# Patient Record
Sex: MALE | Race: WHITE | NOT HISPANIC OR LATINO | Employment: FULL TIME | URBAN - METROPOLITAN AREA
[De-identification: names, ages, dates, MRNs, and addresses within clinical notes are randomized per-mention and may not be internally consistent; named-entity substitution may affect disease eponyms.]

---

## 2023-07-31 ENCOUNTER — LAB (OUTPATIENT)
Dept: LAB | Facility: LAB | Age: 63
End: 2023-07-31
Payer: COMMERCIAL

## 2023-07-31 LAB
ALANINE AMINOTRANSFERASE (SGPT) (U/L) IN SER/PLAS: 11 U/L (ref 10–52)
ALBUMIN (G/DL) IN SER/PLAS: 4.1 G/DL (ref 3.4–5)
ALKALINE PHOSPHATASE (U/L) IN SER/PLAS: 48 U/L (ref 33–136)
ANION GAP IN SER/PLAS: 13 MMOL/L (ref 10–20)
ASPARTATE AMINOTRANSFERASE (SGOT) (U/L) IN SER/PLAS: 18 U/L (ref 9–39)
BILIRUBIN TOTAL (MG/DL) IN SER/PLAS: 0.7 MG/DL (ref 0–1.2)
CALCIUM (MG/DL) IN SER/PLAS: 8.9 MG/DL (ref 8.6–10.6)
CARBON DIOXIDE, TOTAL (MMOL/L) IN SER/PLAS: 26 MMOL/L (ref 21–32)
CHLORIDE (MMOL/L) IN SER/PLAS: 106 MMOL/L (ref 98–107)
CREATININE (MG/DL) IN SER/PLAS: 1.25 MG/DL (ref 0.5–1.3)
ERYTHROCYTE DISTRIBUTION WIDTH (RATIO) BY AUTOMATED COUNT: 12.8 % (ref 11.5–14.5)
ERYTHROCYTE MEAN CORPUSCULAR HEMOGLOBIN CONCENTRATION (G/DL) BY AUTOMATED: 31.9 G/DL (ref 32–36)
ERYTHROCYTE MEAN CORPUSCULAR VOLUME (FL) BY AUTOMATED COUNT: 95 FL (ref 80–100)
ERYTHROCYTES (10*6/UL) IN BLOOD BY AUTOMATED COUNT: 4.1 X10E12/L (ref 4.5–5.9)
GFR MALE: 64 ML/MIN/1.73M2
GLUCOSE (MG/DL) IN SER/PLAS: 97 MG/DL (ref 74–99)
HEMATOCRIT (%) IN BLOOD BY AUTOMATED COUNT: 38.9 % (ref 41–52)
HEMOGLOBIN (G/DL) IN BLOOD: 12.4 G/DL (ref 13.5–17.5)
LEUKOCYTES (10*3/UL) IN BLOOD BY AUTOMATED COUNT: 6.8 X10E9/L (ref 4.4–11.3)
MAGNESIUM (MG/DL) IN SER/PLAS: 2.12 MG/DL (ref 1.6–2.4)
NRBC (PER 100 WBCS) BY AUTOMATED COUNT: 0 /100 WBC (ref 0–0)
PLATELETS (10*3/UL) IN BLOOD AUTOMATED COUNT: 183 X10E9/L (ref 150–450)
POTASSIUM (MMOL/L) IN SER/PLAS: 4.4 MMOL/L (ref 3.5–5.3)
PROTEIN TOTAL: 6.6 G/DL (ref 6.4–8.2)
SODIUM (MMOL/L) IN SER/PLAS: 141 MMOL/L (ref 136–145)
THYROTROPIN (MIU/L) IN SER/PLAS BY DETECTION LIMIT <= 0.05 MIU/L: 2.09 MIU/L (ref 0.44–3.98)
TOBACCO SCREEN, URINE: NEGATIVE
UREA NITROGEN (MG/DL) IN SER/PLAS: 21 MG/DL (ref 6–23)

## 2023-09-22 ENCOUNTER — OFFICE VISIT (OUTPATIENT)
Dept: PRIMARY CARE | Facility: CLINIC | Age: 63
End: 2023-09-22
Payer: COMMERCIAL

## 2023-09-22 VITALS
SYSTOLIC BLOOD PRESSURE: 133 MMHG | HEART RATE: 62 BPM | DIASTOLIC BLOOD PRESSURE: 83 MMHG | OXYGEN SATURATION: 97 % | BODY MASS INDEX: 29.98 KG/M2 | WEIGHT: 191 LBS | HEIGHT: 67 IN

## 2023-09-22 DIAGNOSIS — Z00.00 PHYSICAL EXAM: ICD-10-CM

## 2023-09-22 DIAGNOSIS — Z12.11 ENCOUNTER FOR SCREENING FOR MALIGNANT NEOPLASM OF COLON: Primary | ICD-10-CM

## 2023-09-22 PROCEDURE — 90715 TDAP VACCINE 7 YRS/> IM: CPT | Performed by: FAMILY MEDICINE

## 2023-09-22 PROCEDURE — 99386 PREV VISIT NEW AGE 40-64: CPT | Performed by: FAMILY MEDICINE

## 2023-09-22 PROCEDURE — 90471 IMMUNIZATION ADMIN: CPT | Performed by: FAMILY MEDICINE

## 2023-09-22 PROCEDURE — 1126F AMNT PAIN NOTED NONE PRSNT: CPT | Performed by: FAMILY MEDICINE

## 2023-09-22 RX ORDER — SACUBITRIL AND VALSARTAN 97; 103 MG/1; MG/1
0.5 TABLET, FILM COATED ORAL 2 TIMES DAILY
COMMUNITY
Start: 2020-12-14 | End: 2024-04-02 | Stop reason: SDUPTHER

## 2023-09-22 RX ORDER — ATORVASTATIN CALCIUM 10 MG/1
10 TABLET, FILM COATED ORAL DAILY
COMMUNITY
End: 2023-12-13 | Stop reason: SDUPTHER

## 2023-09-22 RX ORDER — METOPROLOL SUCCINATE 100 MG/1
TABLET, EXTENDED RELEASE ORAL
COMMUNITY
Start: 2020-12-14 | End: 2023-10-27 | Stop reason: ALTCHOICE

## 2023-09-22 RX ORDER — FUROSEMIDE 20 MG/1
1 TABLET ORAL DAILY
COMMUNITY
Start: 2021-10-05 | End: 2023-10-27 | Stop reason: ALTCHOICE

## 2023-09-22 RX ORDER — APIXABAN 5 MG/1
1 TABLET, FILM COATED ORAL 2 TIMES DAILY
COMMUNITY
Start: 2020-12-14 | End: 2023-10-27 | Stop reason: ALTCHOICE

## 2023-09-22 RX ORDER — AMIODARONE HYDROCHLORIDE 200 MG/1
200 TABLET ORAL DAILY
COMMUNITY
Start: 2023-09-01 | End: 2023-10-27 | Stop reason: ALTCHOICE

## 2023-09-22 ASSESSMENT — ENCOUNTER SYMPTOMS
NEUROLOGICAL NEGATIVE: 1
CARDIOVASCULAR NEGATIVE: 1
GASTROINTESTINAL NEGATIVE: 1
MUSCULOSKELETAL NEGATIVE: 1
RESPIRATORY NEGATIVE: 1

## 2023-09-22 NOTE — PROGRESS NOTES
Subjective   Patient ID: Endy Nuñez is a 63 y.o. male who presents for Annual Exam.  HPI  Patient with a history of chronic A-fib has been cardioverted a few times and has had ablation procedures done.  Patient is presently on anticoagulants couple cardiac medications needs follow-up blood work.  Review of Systems   HENT: Negative.     Respiratory: Negative.     Cardiovascular: Negative.    Gastrointestinal: Negative.    Genitourinary: Negative.    Musculoskeletal: Negative.    Neurological: Negative.        Objective   Physical Exam  Constitutional:       Appearance: Normal appearance.   HENT:      Nose: Nose normal.      Mouth/Throat:      Mouth: Mucous membranes are moist.   Eyes:      Extraocular Movements: Extraocular movements intact.      Pupils: Pupils are equal, round, and reactive to light.   Cardiovascular:      Rate and Rhythm: Normal rate and regular rhythm.   Pulmonary:      Effort: Pulmonary effort is normal.      Breath sounds: Normal breath sounds.   Genitourinary:     Rectum: Normal.   Musculoskeletal:         General: Normal range of motion.      Cervical back: Normal range of motion.   Neurological:      Mental Status: He is alert.       Assessment/Plan   Diagnoses and all orders for this visit:  Encounter for screening for malignant neoplasm of colon  -     Colonoscopy Diagnostic; Future  Physical exam  -     Lipid panel; Future  -     PSA; Future  Other orders  -     Tdap vaccine, age 7 years and older  (BOOSTRIX)

## 2023-09-25 ENCOUNTER — LAB (OUTPATIENT)
Dept: LAB | Facility: LAB | Age: 63
End: 2023-09-25
Payer: COMMERCIAL

## 2023-09-25 DIAGNOSIS — Z00.00 PHYSICAL EXAM: ICD-10-CM

## 2023-09-25 LAB
CHOLESTEROL (MG/DL) IN SER/PLAS: 196 MG/DL (ref 0–199)
CHOLESTEROL IN HDL (MG/DL) IN SER/PLAS: 70.3 MG/DL
CHOLESTEROL/HDL RATIO: 2.8
LDL: 93 MG/DL (ref 0–99)
PROSTATE SPECIFIC AG (NG/ML) IN SER/PLAS: 1.67 NG/ML (ref 0–4)
TRIGLYCERIDE (MG/DL) IN SER/PLAS: 162 MG/DL (ref 0–149)
VLDL: 32 MG/DL (ref 0–40)

## 2023-09-25 PROCEDURE — 84153 ASSAY OF PSA TOTAL: CPT

## 2023-09-25 PROCEDURE — 36415 COLL VENOUS BLD VENIPUNCTURE: CPT

## 2023-09-25 PROCEDURE — 80061 LIPID PANEL: CPT

## 2023-10-11 ENCOUNTER — PHARMACY VISIT (OUTPATIENT)
Dept: PHARMACY | Facility: CLINIC | Age: 63
End: 2023-10-11
Payer: COMMERCIAL

## 2023-10-11 PROCEDURE — RXMED WILLOW AMBULATORY MEDICATION CHARGE

## 2023-10-12 RX ORDER — TIMOLOL MALEATE 5 MG/ML
SOLUTION/ DROPS OPHTHALMIC
Qty: 55 ML | Refills: 0 | OUTPATIENT
Start: 2023-10-12 | End: 2024-10-11

## 2023-10-13 ENCOUNTER — PHARMACY VISIT (OUTPATIENT)
Dept: PHARMACY | Facility: CLINIC | Age: 63
End: 2023-10-13
Payer: COMMERCIAL

## 2023-10-13 PROCEDURE — RXMED WILLOW AMBULATORY MEDICATION CHARGE

## 2023-10-18 ENCOUNTER — PHARMACY VISIT (OUTPATIENT)
Dept: PHARMACY | Facility: CLINIC | Age: 63
End: 2023-10-18
Payer: COMMERCIAL

## 2023-10-18 DIAGNOSIS — Z12.11 SCREENING FOR COLON CANCER: ICD-10-CM

## 2023-10-18 RX ORDER — POLYETHYLENE GLYCOL-3350 AND ELECTROLYTES 236; 6.74; 5.86; 2.97; 22.74 G/274.31G; G/274.31G; G/274.31G; G/274.31G; G/274.31G
POWDER, FOR SOLUTION ORAL
Qty: 4000 ML | Refills: 0 | Status: SHIPPED | OUTPATIENT
Start: 2023-10-18 | End: 2024-05-24 | Stop reason: WASHOUT

## 2023-10-23 ENCOUNTER — PHARMACY VISIT (OUTPATIENT)
Dept: PHARMACY | Facility: CLINIC | Age: 63
End: 2023-10-23
Payer: COMMERCIAL

## 2023-10-23 PROCEDURE — RXMED WILLOW AMBULATORY MEDICATION CHARGE

## 2023-10-26 PROBLEM — R06.09 DOE (DYSPNEA ON EXERTION): Status: ACTIVE | Noted: 2023-10-26

## 2023-10-26 PROBLEM — I71.21 ANEURYSM OF ASCENDING AORTA (CMS-HCC): Status: ACTIVE | Noted: 2023-10-26

## 2023-10-26 PROBLEM — I42.9 CARDIOMYOPATHY (MULTI): Status: ACTIVE | Noted: 2023-10-26

## 2023-10-26 PROBLEM — R06.83 SNORES: Status: ACTIVE | Noted: 2023-10-26

## 2023-10-26 PROBLEM — R00.2 PALPITATIONS: Status: ACTIVE | Noted: 2023-10-26

## 2023-10-26 PROBLEM — R91.8 PULMONARY NODULES: Status: ACTIVE | Noted: 2023-10-26

## 2023-10-26 PROBLEM — I48.91 ATRIAL FIBRILLATION (MULTI): Status: ACTIVE | Noted: 2023-10-26

## 2023-10-26 PROBLEM — I50.9 CHF (CONGESTIVE HEART FAILURE) (MULTI): Status: ACTIVE | Noted: 2023-10-26

## 2023-10-26 PROBLEM — R93.1 ELEVATED CORONARY ARTERY CALCIUM SCORE: Status: ACTIVE | Noted: 2023-10-26

## 2023-10-26 PROBLEM — M65.319 TRIGGER THUMB: Status: ACTIVE | Noted: 2023-10-26

## 2023-10-26 PROBLEM — R07.89 ATYPICAL CHEST PAIN: Status: ACTIVE | Noted: 2023-10-26

## 2023-10-26 PROBLEM — G47.33 OBSTRUCTIVE SLEEP APNEA, ADULT: Status: ACTIVE | Noted: 2023-10-26

## 2023-10-27 ENCOUNTER — LAB (OUTPATIENT)
Dept: LAB | Facility: LAB | Age: 63
End: 2023-10-27
Payer: COMMERCIAL

## 2023-10-27 ENCOUNTER — OFFICE VISIT (OUTPATIENT)
Dept: CARDIOLOGY | Facility: CLINIC | Age: 63
End: 2023-10-27
Payer: COMMERCIAL

## 2023-10-27 VITALS
HEART RATE: 60 BPM | SYSTOLIC BLOOD PRESSURE: 104 MMHG | DIASTOLIC BLOOD PRESSURE: 69 MMHG | BODY MASS INDEX: 29.18 KG/M2 | WEIGHT: 192.5 LBS | HEIGHT: 68 IN | OXYGEN SATURATION: 95 %

## 2023-10-27 DIAGNOSIS — I48.91 ATRIAL FIBRILLATION, UNSPECIFIED TYPE (MULTI): Primary | ICD-10-CM

## 2023-10-27 DIAGNOSIS — I48.91 ATRIAL FIBRILLATION, UNSPECIFIED TYPE (MULTI): ICD-10-CM

## 2023-10-27 LAB
ALBUMIN SERPL BCP-MCNC: 4.2 G/DL (ref 3.4–5)
ALP SERPL-CCNC: 44 U/L (ref 33–136)
ALT SERPL W P-5'-P-CCNC: 15 U/L (ref 10–52)
ANION GAP SERPL CALC-SCNC: 12 MMOL/L (ref 10–20)
AST SERPL W P-5'-P-CCNC: 20 U/L (ref 9–39)
BILIRUB SERPL-MCNC: 0.6 MG/DL (ref 0–1.2)
BUN SERPL-MCNC: 20 MG/DL (ref 6–23)
CALCIUM SERPL-MCNC: 8.9 MG/DL (ref 8.6–10.6)
CHLORIDE SERPL-SCNC: 106 MMOL/L (ref 98–107)
CO2 SERPL-SCNC: 26 MMOL/L (ref 21–32)
CREAT SERPL-MCNC: 1.2 MG/DL (ref 0.5–1.3)
ERYTHROCYTE [DISTWIDTH] IN BLOOD BY AUTOMATED COUNT: 13 % (ref 11.5–14.5)
GFR SERPL CREATININE-BSD FRML MDRD: 67 ML/MIN/1.73M*2
GLUCOSE SERPL-MCNC: 59 MG/DL (ref 74–99)
HCT VFR BLD AUTO: 41.4 % (ref 41–52)
HGB BLD-MCNC: 13.5 G/DL (ref 13.5–17.5)
MCH RBC QN AUTO: 30.3 PG (ref 26–34)
MCHC RBC AUTO-ENTMCNC: 32.6 G/DL (ref 32–36)
MCV RBC AUTO: 93 FL (ref 80–100)
NRBC BLD-RTO: 0 /100 WBCS (ref 0–0)
PLATELET # BLD AUTO: 176 X10*3/UL (ref 150–450)
PMV BLD AUTO: 11.3 FL (ref 7.5–11.5)
POTASSIUM SERPL-SCNC: 4.5 MMOL/L (ref 3.5–5.3)
PROT SERPL-MCNC: 6.5 G/DL (ref 6.4–8.2)
RBC # BLD AUTO: 4.46 X10*6/UL (ref 4.5–5.9)
SODIUM SERPL-SCNC: 139 MMOL/L (ref 136–145)
WBC # BLD AUTO: 6.4 X10*3/UL (ref 4.4–11.3)

## 2023-10-27 PROCEDURE — 99214 OFFICE O/P EST MOD 30 MIN: CPT | Performed by: NURSE PRACTITIONER

## 2023-10-27 PROCEDURE — 1036F TOBACCO NON-USER: CPT | Performed by: NURSE PRACTITIONER

## 2023-10-27 PROCEDURE — 93010 ELECTROCARDIOGRAM REPORT: CPT | Performed by: INTERNAL MEDICINE

## 2023-10-27 PROCEDURE — 93005 ELECTROCARDIOGRAM TRACING: CPT | Performed by: NURSE PRACTITIONER

## 2023-10-27 PROCEDURE — 36415 COLL VENOUS BLD VENIPUNCTURE: CPT

## 2023-10-27 PROCEDURE — 85027 COMPLETE CBC AUTOMATED: CPT

## 2023-10-27 PROCEDURE — 1159F MED LIST DOCD IN RCRD: CPT | Performed by: NURSE PRACTITIONER

## 2023-10-27 PROCEDURE — 80053 COMPREHEN METABOLIC PANEL: CPT

## 2023-10-27 PROCEDURE — 1126F AMNT PAIN NOTED NONE PRSNT: CPT | Performed by: NURSE PRACTITIONER

## 2023-10-27 ASSESSMENT — ENCOUNTER SYMPTOMS
ABDOMINAL PAIN: 0
PND: 0
DIAPHORESIS: 0
COUGH: 0
IRREGULAR HEARTBEAT: 0
BLOATING: 1
DIZZINESS: 0
BLURRED VISION: 0
SNORING: 0
SPUTUM PRODUCTION: 0
PALPITATIONS: 0
DYSPNEA ON EXERTION: 0
NEAR-SYNCOPE: 0
HEMOPTYSIS: 0
ORTHOPNEA: 0
DIARRHEA: 0
SYNCOPE: 0
HEADACHES: 0
LIGHT-HEADEDNESS: 0
SHORTNESS OF BREATH: 0
FEVER: 0
WEAKNESS: 0
MYALGIAS: 0
VOMITING: 0
DOUBLE VISION: 0
SORE THROAT: 0
NAUSEA: 0
FALLS: 0

## 2023-10-27 ASSESSMENT — PAIN SCALES - GENERAL: PAINLEVEL: 0-NO PAIN

## 2023-10-27 NOTE — PROGRESS NOTES
Subjective   Endy Nuñez is a 63 y.o. male.    Chief Complaint:  Atrial Fibrillation    Endy Nuñez is a 63 year old with:     1. AF and AFL - index diagnosis in November of 2020. He underwent HASEEB guided cardioversion 11/24/2020. He reverted into atrial flutter right afterwards and was started on Dofetilide. The dose had to be reduced to 125 mcg because of the prolong QT but he still continue to have the episodes of AF and AFL. At the same time he was diagnosed with EF of 35 % and underwent SG guided CHF treatment    On 12/08/2020 he underwent PVI and RFA of atrial flutter. He is feeling better and better as the days go by. ECHO 12/17/2020 showed EF of 45-50%.   He continued to have episodes of fast heart rate on his Fitbit. . A monitor that he wore in February of 2021 shows 94% sinus rhythm.   FEB 2021: He has tachycardia induced cardiomyopathy. His predominant rhythm was atrial flutter/tach and AF. He failed Tikosyn.  ECHO: TTE (October of 2021) LVEF 60-65%. Ascending aorta 4.1 cm. Mild LVH. No significant valve disease. LVIDd 4.25 cm.     Flecainide was started at Dr. Cochran's last visit 11/15/2022 what he was found to be back in Afib.  He then underwent successful HASEEB guided DCCV on 12/8/2022  At some point the Flecainide was switched to Amiodarone     Now s/p redo PVI, posterior and anterior wall scar modification RFA with Dr. Francisco 3/6/2023  ECG 4/18/2023 SB HR 52 bpm  Amiodarone was stopped post ablation  ECG 6/30/2023 SB Hr 56 bpm normal intervals. Amiodarone was resumed due to recurrent arrhythmia symptoms and abnormal ECGs on his smart watch      Holter monitor 6/30-7/13/23 SB-ST, max  bpm, minimum HR 37bpm, avg 60 bpm, <1% VE burden, 1% SVE burden, 211 SVT episodes, longest 481 beats, fastest 161 on 6/30. SVT episodes appear to be Atach  ECG 7/28/2023 NSR HR 54 bpm, QTc 479 ms  Patient had low BP and was dizzy at the visit in July.  Lab work revealed some mild anemia and his Entresto dose was  cut in half.  Patient just got a refill of his medication and requested that he cut his current pills in half instead of getting a new prescription.    ECG 10/27/2023 SB with PAC HR 58 bpm, Qtc 4865 ms    TODAY patient presents for 3-month follow-up of atrial arrhythmias.  He has been wearing his smart watch and has denied any further episodes of elevated heart rate or arrhythmia symptoms.  He followed up with his primary care provider after our last visit, who has ordered a colonoscopy.  Patient is wondering if he can recheck his lab work today.  He is still cutting his Entresto in half.  He denies any further dizziness.  However he has noticed an increased need for his furosemide as needed.  He is taking it now up to 4 times a week.  He notices fluid retention in his abdomen and lower extremities.  He denies any dyspnea on exertion, orthopnea, or chest pain.  He denies any blood in his urine or stool.        Review of Systems   Constitutional: Negative for diaphoresis, fever and malaise/fatigue.   HENT:  Negative for congestion and sore throat.    Eyes:  Negative for blurred vision and double vision.   Cardiovascular:  Positive for leg swelling. Negative for chest pain, dyspnea on exertion, irregular heartbeat, near-syncope, orthopnea, palpitations, paroxysmal nocturnal dyspnea and syncope.   Respiratory:  Negative for cough, hemoptysis, shortness of breath, snoring and sputum production.    Hematologic/Lymphatic: Negative for bleeding problem.   Skin:  Negative for rash.   Musculoskeletal:  Negative for falls, joint pain and myalgias.   Gastrointestinal:  Positive for bloating. Negative for abdominal pain, diarrhea, nausea and vomiting.   Neurological:  Negative for dizziness, headaches, light-headedness and weakness.   All other systems reviewed and are negative.      Objective   Constitutional:       Appearance: Healthy appearance. Not in distress.   Eyes:      Conjunctiva/sclera: Conjunctivae normal.   HENT:       Nose: Nose normal.    Mouth/Throat:      Pharynx: Oropharynx is clear.   Pulmonary:      Effort: Pulmonary effort is normal.      Breath sounds: Normal breath sounds. No wheezing. No rhonchi.   Chest:      Chest wall: Not tender to palpatation.   Cardiovascular:      Normal rate. Regular rhythm.      Murmurs: There is no murmur.      No rub.   Pulses:     Intact distal pulses.   Edema:     Ankle: bilateral trace edema of the ankle.     Feet: bilateral trace edema of the feet.  Abdominal:      General: Bowel sounds are normal.      Palpations: Abdomen is soft.   Musculoskeletal: Normal range of motion.      Cervical back: Neck supple. Skin:     General: Skin is warm and dry.   Neurological:      Mental Status: Alert and oriented to person, place and time.      Motor: Motor function is intact.         Lab Review:   Lab Results   Component Value Date     07/31/2023    K 4.4 07/31/2023     07/31/2023    CO2 26 07/31/2023    BUN 21 07/31/2023    CREATININE 1.25 07/31/2023    GLUCOSE 97 07/31/2023    CALCIUM 8.9 07/31/2023     Lab Results   Component Value Date    WBC 6.8 07/31/2023    HGB 12.4 (L) 07/31/2023    HCT 38.9 (L) 07/31/2023    MCV 95 07/31/2023     07/31/2023       Assessment/Plan   The encounter diagnosis was Atrial fibrillation, unspecified type (CMS/HCC).  Patient has been doing well from an arrhythmia standpoint.  His blood pressure is still on the low/normal side.  However he has noticed increased need for diuretic after reducing his Entresto dosage.  I am pleased to see that he followed up with his primary care provider regarding the anemia and has been ordered a colonoscopy.  He denies any noticeable bleeding from anywhere.    We will stop his amiodarone today, I educated him on signs/symptoms to look out for should his arrhythmia return.  Patient knows to call the office if this happens.  Continue all other medications as ordered, I recommend he follow-up with Dr. Cochran  regarding his diuretic need.  We can recheck a CBC/CMP today  Follow-up with me in 6 months or sooner as needed    Plan discussed with Dr. Francisco

## 2023-10-27 NOTE — LETTER
October 27, 2023     Eliana Be MD  [address]  [facility] [address]    Patient: Endy Nuñez   YOB: 1960   Date of Visit: 10/27/2023       Dear Dr. Eliana Be MD:    Thank you for referring Endy Nuñez to me for evaluation. Below are my notes for this consultation.  If you have questions, please do not hesitate to call me. I look forward to following your patient along with you.       Sincerely,     Kaylyn Potter, APRN-CNP      CC: No Recipients  ______________________________________________________________________________________    Subjective  Endy Nuñez is a 63 y.o. male.    Chief Complaint:  Atrial Fibrillation    Endy Nuñez is a 63 year old with:     1. AF and AFL - index diagnosis in November of 2020. He underwent HASEEB guided cardioversion 11/24/2020. He reverted into atrial flutter right afterwards and was started on Dofetilide. The dose had to be reduced to 125 mcg because of the prolong QT but he still continue to have the episodes of AF and AFL. At the same time he was diagnosed with EF of 35 % and underwent SG guided CHF treatment    On 12/08/2020 he underwent PVI and RFA of atrial flutter. He is feeling better and better as the days go by. ECHO 12/17/2020 showed EF of 45-50%.   He continued to have episodes of fast heart rate on his Fitbit. . A monitor that he wore in February of 2021 shows 94% sinus rhythm.   FEB 2021: He has tachycardia induced cardiomyopathy. His predominant rhythm was atrial flutter/tach and AF. He failed Tikosyn.  ECHO: TTE (October of 2021) LVEF 60-65%. Ascending aorta 4.1 cm. Mild LVH. No significant valve disease. LVIDd 4.25 cm.     Flecainide was started at Dr. Be's last visit 11/15/2022 what he was found to be back in Afib.  He then underwent successful HASEEB guided DCCV on 12/8/2022  At some point the Flecainide was switched to Amiodarone     Now s/p redo PVI, posterior and anterior wall scar modification RFA with Dr. Francisco  3/6/2023  ECG 4/18/2023 SB HR 52 bpm  Amiodarone was stopped post ablation  ECG 6/30/2023 SB Hr 56 bpm normal intervals. Amiodarone was resumed due to recurrent arrhythmia symptoms and abnormal ECGs on his smart watch      Holter monitor 6/30-7/13/23 SB-ST, max  bpm, minimum HR 37bpm, avg 60 bpm, <1% VE burden, 1% SVE burden, 211 SVT episodes, longest 481 beats, fastest 161 on 6/30. SVT episodes appear to be Atach  ECG 7/28/2023 NSR HR 54 bpm, QTc 479 ms  Patient had low BP and was dizzy at the visit in July.  Lab work revealed some mild anemia and his Entresto dose was cut in half.  Patient just got a refill of his medication and requested that he cut his current pills in half instead of getting a new prescription.    ECG 10/27/2023 SB with PAC HR 58 bpm, Qtc 4865 ms    TODAY patient presents for 3-month follow-up of atrial arrhythmias.  He has been wearing his smart watch and has denied any further episodes of elevated heart rate or arrhythmia symptoms.  He followed up with his primary care provider after our last visit, who has ordered a colonoscopy.  Patient is wondering if he can recheck his lab work today.  He is still cutting his Entresto in half.  He denies any further dizziness.  However he has noticed an increased need for his furosemide as needed.  He is taking it now up to 4 times a week.  He notices fluid retention in his abdomen and lower extremities.  He denies any dyspnea on exertion, orthopnea, or chest pain.  He denies any blood in his urine or stool.        Review of Systems   Constitutional: Negative for diaphoresis, fever and malaise/fatigue.   HENT:  Negative for congestion and sore throat.    Eyes:  Negative for blurred vision and double vision.   Cardiovascular:  Positive for leg swelling. Negative for chest pain, dyspnea on exertion, irregular heartbeat, near-syncope, orthopnea, palpitations, paroxysmal nocturnal dyspnea and syncope.   Respiratory:  Negative for cough, hemoptysis,  shortness of breath, snoring and sputum production.    Hematologic/Lymphatic: Negative for bleeding problem.   Skin:  Negative for rash.   Musculoskeletal:  Negative for falls, joint pain and myalgias.   Gastrointestinal:  Positive for bloating. Negative for abdominal pain, diarrhea, nausea and vomiting.   Neurological:  Negative for dizziness, headaches, light-headedness and weakness.   All other systems reviewed and are negative.      Objective  Constitutional:       Appearance: Healthy appearance. Not in distress.   Eyes:      Conjunctiva/sclera: Conjunctivae normal.   HENT:      Nose: Nose normal.    Mouth/Throat:      Pharynx: Oropharynx is clear.   Pulmonary:      Effort: Pulmonary effort is normal.      Breath sounds: Normal breath sounds. No wheezing. No rhonchi.   Chest:      Chest wall: Not tender to palpatation.   Cardiovascular:      Normal rate. Regular rhythm.      Murmurs: There is no murmur.      No rub.   Pulses:     Intact distal pulses.   Edema:     Ankle: bilateral trace edema of the ankle.     Feet: bilateral trace edema of the feet.  Abdominal:      General: Bowel sounds are normal.      Palpations: Abdomen is soft.   Musculoskeletal: Normal range of motion.      Cervical back: Neck supple. Skin:     General: Skin is warm and dry.   Neurological:      Mental Status: Alert and oriented to person, place and time.      Motor: Motor function is intact.         Lab Review:   Lab Results   Component Value Date     07/31/2023    K 4.4 07/31/2023     07/31/2023    CO2 26 07/31/2023    BUN 21 07/31/2023    CREATININE 1.25 07/31/2023    GLUCOSE 97 07/31/2023    CALCIUM 8.9 07/31/2023     Lab Results   Component Value Date    WBC 6.8 07/31/2023    HGB 12.4 (L) 07/31/2023    HCT 38.9 (L) 07/31/2023    MCV 95 07/31/2023     07/31/2023       Assessment/Plan  The encounter diagnosis was Atrial fibrillation, unspecified type (CMS/HCC).  Patient has been doing well from an arrhythmia  standpoint.  His blood pressure is still on the low/normal side.  However he has noticed increased need for diuretic after reducing his Entresto dosage.  I am pleased to see that he followed up with his primary care provider regarding the anemia and has been ordered a colonoscopy.  He denies any noticeable bleeding from anywhere.    We will stop his amiodarone today, I educated him on signs/symptoms to look out for should his arrhythmia return.  Patient knows to call the office if this happens.  Continue all other medications as ordered, I recommend he follow-up with Dr. Cochran regarding his diuretic need.  We can recheck a CBC/CMP today  Follow-up with me in 6 months or sooner as needed    Plan discussed with Dr. Francisco

## 2023-11-02 LAB
ATRIAL RATE: 58 BPM
P AXIS: 10 DEGREES
P OFFSET: 180 MS
P ONSET: 127 MS
PR INTERVAL: 186 MS
Q ONSET: 220 MS
QRS COUNT: 10 BEATS
QRS DURATION: 88 MS
QT INTERVAL: 496 MS
QTC CALCULATION(BAZETT): 486 MS
QTC FREDERICIA: 490 MS
R AXIS: 23 DEGREES
T AXIS: -10 DEGREES
T OFFSET: 468 MS
VENTRICULAR RATE: 58 BPM

## 2023-11-03 ENCOUNTER — PHARMACY VISIT (OUTPATIENT)
Dept: PHARMACY | Facility: CLINIC | Age: 63
End: 2023-11-03
Payer: COMMERCIAL

## 2023-11-03 PROCEDURE — RXMED WILLOW AMBULATORY MEDICATION CHARGE

## 2023-11-09 ENCOUNTER — PREP FOR PROCEDURE (OUTPATIENT)
Dept: GASTROENTEROLOGY | Facility: HOSPITAL | Age: 63
End: 2023-11-09
Payer: COMMERCIAL

## 2023-11-14 ENCOUNTER — PHARMACY VISIT (OUTPATIENT)
Dept: PHARMACY | Facility: CLINIC | Age: 63
End: 2023-11-14
Payer: COMMERCIAL

## 2023-11-14 PROCEDURE — RXMED WILLOW AMBULATORY MEDICATION CHARGE

## 2023-11-16 ENCOUNTER — ANESTHESIA EVENT (OUTPATIENT)
Dept: GASTROENTEROLOGY | Facility: HOSPITAL | Age: 63
End: 2023-11-16
Payer: COMMERCIAL

## 2023-11-16 ENCOUNTER — ANESTHESIA (OUTPATIENT)
Dept: GASTROENTEROLOGY | Facility: HOSPITAL | Age: 63
End: 2023-11-16
Payer: COMMERCIAL

## 2023-11-16 ENCOUNTER — HOSPITAL ENCOUNTER (OUTPATIENT)
Dept: GASTROENTEROLOGY | Facility: HOSPITAL | Age: 63
Discharge: HOME | End: 2023-11-16
Payer: COMMERCIAL

## 2023-11-16 VITALS
SYSTOLIC BLOOD PRESSURE: 115 MMHG | RESPIRATION RATE: 17 BRPM | OXYGEN SATURATION: 96 % | WEIGHT: 185 LBS | TEMPERATURE: 97.3 F | HEART RATE: 51 BPM | DIASTOLIC BLOOD PRESSURE: 75 MMHG | BODY MASS INDEX: 28.13 KG/M2

## 2023-11-16 DIAGNOSIS — Z12.11 SCREENING FOR COLON CANCER: Primary | ICD-10-CM

## 2023-11-16 PROCEDURE — 45378 DIAGNOSTIC COLONOSCOPY: CPT | Performed by: COLON & RECTAL SURGERY

## 2023-11-16 PROCEDURE — 2500000005 HC RX 250 GENERAL PHARMACY W/O HCPCS

## 2023-11-16 PROCEDURE — 3700000001 HC GENERAL ANESTHESIA TIME - INITIAL BASE CHARGE

## 2023-11-16 PROCEDURE — 7100000009 HC PHASE TWO TIME - INITIAL BASE CHARGE

## 2023-11-16 PROCEDURE — 7100000010 HC PHASE TWO TIME - EACH INCREMENTAL 1 MINUTE

## 2023-11-16 PROCEDURE — 3700000002 HC GENERAL ANESTHESIA TIME - EACH INCREMENTAL 1 MINUTE

## 2023-11-16 PROCEDURE — A45378 PR COLONOSCOPY,DIAGNOSTIC

## 2023-11-16 PROCEDURE — 2500000004 HC RX 250 GENERAL PHARMACY W/ HCPCS (ALT 636 FOR OP/ED)

## 2023-11-16 PROCEDURE — A45378 PR COLONOSCOPY,DIAGNOSTIC: Performed by: ANESTHESIOLOGY

## 2023-11-16 RX ORDER — PROPOFOL 10 MG/ML
INJECTION, EMULSION INTRAVENOUS CONTINUOUS PRN
Status: DISCONTINUED | OUTPATIENT
Start: 2023-11-16 | End: 2023-11-16

## 2023-11-16 RX ORDER — PROPOFOL 10 MG/ML
INJECTION, EMULSION INTRAVENOUS AS NEEDED
Status: DISCONTINUED | OUTPATIENT
Start: 2023-11-16 | End: 2023-11-16

## 2023-11-16 RX ORDER — SODIUM CHLORIDE, SODIUM LACTATE, POTASSIUM CHLORIDE, CALCIUM CHLORIDE 600; 310; 30; 20 MG/100ML; MG/100ML; MG/100ML; MG/100ML
20 INJECTION, SOLUTION INTRAVENOUS CONTINUOUS
Status: DISCONTINUED | OUTPATIENT
Start: 2023-11-16 | End: 2023-11-17 | Stop reason: HOSPADM

## 2023-11-16 RX ORDER — LIDOCAINE HYDROCHLORIDE 20 MG/ML
INJECTION, SOLUTION INFILTRATION; PERINEURAL AS NEEDED
Status: DISCONTINUED | OUTPATIENT
Start: 2023-11-16 | End: 2023-11-16

## 2023-11-16 RX ADMIN — PROPOFOL 30 MG: 10 INJECTION, EMULSION INTRAVENOUS at 13:55

## 2023-11-16 RX ADMIN — PROPOFOL 50 MG: 10 INJECTION, EMULSION INTRAVENOUS at 13:50

## 2023-11-16 RX ADMIN — PROPOFOL 20 MG: 10 INJECTION, EMULSION INTRAVENOUS at 14:02

## 2023-11-16 RX ADMIN — LIDOCAINE HYDROCHLORIDE 2 ML: 20 INJECTION, SOLUTION INFILTRATION; PERINEURAL at 13:50

## 2023-11-16 RX ADMIN — PROPOFOL 250 MCG/KG/MIN: 10 INJECTION, EMULSION INTRAVENOUS at 13:50

## 2023-11-16 RX ADMIN — SODIUM CHLORIDE, POTASSIUM CHLORIDE, SODIUM LACTATE AND CALCIUM CHLORIDE: 600; 310; 30; 20 INJECTION, SOLUTION INTRAVENOUS at 13:47

## 2023-11-16 ASSESSMENT — COLUMBIA-SUICIDE SEVERITY RATING SCALE - C-SSRS
1. IN THE PAST MONTH, HAVE YOU WISHED YOU WERE DEAD OR WISHED YOU COULD GO TO SLEEP AND NOT WAKE UP?: NO
2. HAVE YOU ACTUALLY HAD ANY THOUGHTS OF KILLING YOURSELF?: NO
6. HAVE YOU EVER DONE ANYTHING, STARTED TO DO ANYTHING, OR PREPARED TO DO ANYTHING TO END YOUR LIFE?: NO

## 2023-11-16 ASSESSMENT — PAIN SCALES - GENERAL
PAINLEVEL_OUTOF10: 0 - NO PAIN

## 2023-11-16 ASSESSMENT — PAIN - FUNCTIONAL ASSESSMENT
PAIN_FUNCTIONAL_ASSESSMENT: 0-10

## 2023-11-16 NOTE — ANESTHESIA POSTPROCEDURE EVALUATION
Patient: Endy Nuñez    Procedure Summary       Date: 11/16/23 Room / Location: [facility]    Anesthesia Start: 1347 Anesthesia Stop: 1416    Procedure: COLONOSCOPY Diagnosis:       Screening for colon cancer      Screening for colon cancer    Scheduled Providers: Goldie Spaulding MD; Salvador Pritchett MD; LC Barber Responsible Provider: Salvador Pritchett MD    Anesthesia Type: MAC ASA Status: 3            Anesthesia Type: MAC    Vitals Value Taken Time   /75 11/16/23 1445   Temp 36.3 °C (97.3 °F) 11/16/23 1414   Pulse 51 11/16/23 1445   Resp 17 11/16/23 1445   SpO2 96 % 11/16/23 1445       Anesthesia Post Evaluation    Patient location during evaluation: bedside  Patient participation: complete - patient participated  Level of consciousness: awake and alert  Pain management: satisfactory to patient  Airway patency: patent  Cardiovascular status: acceptable  Respiratory status: acceptable  Hydration status: acceptable  Postoperative Nausea and Vomiting: none  Comments: No apparent complications.        No notable events documented.

## 2023-11-16 NOTE — LETTER
Goldie Spaulding MD   [facility][note 3]   [address] [facility] [address]   [phone]      Dear Mr. Nuñez       It was my pleasure to see you at your recent colonoscopy.  At that time,  your examination was completely normal.  The current recommendation is to repeat the colonoscopy in 10 years unless a first degree relative develops colon cancer then it decreases to 5 years.      Thank you very much for allowing me to take part in your care, please feel free to contact me with any questions or concerns at [phone].          Sincerely,       Goldie Spaulding M.D. FACS, FASCRS    CC:  Primary Care:

## 2023-11-16 NOTE — ANESTHESIA PREPROCEDURE EVALUATION
Patient: Enyd Peric    Procedure Information       Date/Time: 23 1330    Scheduled providers: Goldie Spaulding MD; Salvador Pritchett MD; LC Barber    Procedure: COLONOSCOPY    [facility]: [facility]            Relevant Problems   Anesthesia   No history of complications History of anesthesia complications      Cardiovascular   (+) Aneurysm of ascending aorta (CMS/HCC)   (+) Atrial fibrillation (CMS/HCC)   (+) Atypical chest pain   (+) CHF (congestive heart failure) (CMS/HCC)      Pulmonary   (+) MCNAMARA (dyspnea on exertion)   (+) Obstructive sleep apnea, adult   (+) Pulmonary nodules       Clinical information reviewed:   Tobacco  Allergies  Meds  Problems  Med Hx  Surg Hx   Fam Hx  Soc   Hx        NPO/Void Status  Date of Last Liquid: 23  Time of Last Liquid: 0900  Date of Last Solid: 23  Time of Last Solid: 1900  Last Intake Type: Clear fluids  Time of Last Void: 0800           Past Medical History:   Diagnosis Date    Atrial fibrillation (CMS/HCC)     Cardiomyopathy (CMS/HCC)     CHF (congestive heart failure) (CMS/HCC)     Hyperlipidemia     Hypertension     Mild CAD 2020    10-30% mid RCA    Sleep apnea       Past Surgical History:   Procedure Laterality Date    ANKLE SURGERY          CARDIAC ELECTROPHYSIOLOGY MAPPING AND ABLATION  2023    Afib    HAND TENDON SURGERY  10/19/2015    Hand Incision Tendon Sheath Of A Finger    OTHER SURGICAL HISTORY  2021 and 2023    Catheter ablation    ROTATOR CUFF REPAIR       Social History     Tobacco Use    Smoking status: Former     Packs/day: 1     Types: Cigarettes     Quit date:      Years since quittin.8    Smokeless tobacco: Never   Substance Use Topics    Alcohol use: Yes     Alcohol/week: 2.0 standard drinks of alcohol     Types: 2 Cans of beer per week    Drug use: Never      Current Outpatient Medications   Medication Instructions    apixaban (Eliquis) 5 mg tablet TAKE 1 TABLET BY MOUTH  TWO TIMES A DAY    atorvastatin (LIPITOR) 10 mg, oral, Daily, as directed    Entresto  mg tablet 0.5 tablets, oral, 2 times daily    furosemide (Lasix) 20 mg tablet TAKE 1 TABLET BY MOUTH ONCE DAILY    metoprolol succinate XL (Toprol-XL) 100 mg 24 hr tablet TAKE 1 TABLET BY MOUTH ONCE DAILY    polyethylene glycol-electrolytes (GaviLyte-G) 420 gram solution Please refer to the printed instructions that were mailed to you.    timolol (Timoptic) 0.5 % ophthalmic solution USE 1 DROP IN THE RIGHT EYE EVERY MORNING.      No Known Allergies     Chemistry    Lab Results   Component Value Date/Time     10/27/2023 0853    K 4.5 10/27/2023 0853     10/27/2023 0853    CO2 26 10/27/2023 0853    BUN 20 10/27/2023 0853    CREATININE 1.20 10/27/2023 0853    Lab Results   Component Value Date/Time    CALCIUM 8.9 10/27/2023 0853    ALKPHOS 44 10/27/2023 0853    AST 20 10/27/2023 0853    ALT 15 10/27/2023 0853    BILITOT 0.6 10/27/2023 0853          Lab Results   Component Value Date/Time    WBC 6.4 10/27/2023 0853    HGB 13.5 10/27/2023 0853    HCT 41.4 10/27/2023 0853     10/27/2023 0853     Lab Results   Component Value Date/Time    PROTIME 16.9 (H) 11/27/2020 0326    INR 1.4 (H) 11/27/2020 0326     Encounter Date: 10/27/23   ECG 12 lead (Clinic Performed)   Result Value    Ventricular Rate 58    Atrial Rate 58    CA Interval 186    QRS Duration 88    QT Interval 496    QTC Calculation(Bazett) 486    P Axis 10    R Axis 23    T Axis -10    QRS Count 10    Q Onset 220    P Onset 127    P Offset 180    T Offset 468    QTC Fredericia 490    Narrative    Sinus bradycardia with Premature atrial complexes  ST abnormality, possible digitalis effect  Prolonged QT  Abnormal ECG  When compared with ECG of 28-JUL-2023 08:16,  Premature atrial complexes are now Present  Confirmed by Julianna Daigle ([id number]) on 11/2/2023 2:18:24 PM     No results found for this or any previous visit from the past 1095 days.   Echo  12/8/2022:  HASEEB Procedure: The probe was passed without difficulty.  Left Ventricle: The left ventricular systolic function is low normal, with an estimated ejection fraction of 50%. The patient is in atrial fibrillation which may influence the estimate of left ventricular function and transvalvular flows. There are no regional wall motion abnormalities. The left ventricular cavity size is normal. Left ventricular diastolic filling was not assessed.  Left Atrium: The left atrium is moderately dilated. A bubble study using agitated saline was performed. Bubble study is negative. The left atrial appendage Doppler velocities are reduced and there is no thrombus visualized in the left atrial appendage.  Right Ventricle: The right ventricle is normal in size. There is normal right ventricular global systolic function.  Right Atrium: The right atrium is mildly dilated.  Aortic Valve: The aortic valve is trileaflet. There is trivial aortic valve regurgitation.  Mitral Valve: The mitral valve is normal in structure. There is mild mitral valve regurgitation.  Tricuspid Valve: The tricuspid valve is structurally normal. There is trace to mild tricuspid regurgitation.  Pulmonic Valve: The pulmonic valve is structurally normal. There is no indication of pulmonic valve regurgitation.  Pericardium: There is no pericardial effusion noted.  Aorta: The aortic root is normal.  In comparison to the previous echocardiogram(s): Compared with study from 11/25/2022, no significant change.        CONCLUSIONS:   1. Left ventricular systolic function is low normal with a 50% estimated ejection fraction.   2. The left atrium is moderately dilated.   3. There is no thrombus visualized in the left atrial appendage.   4. The patient is in atrial fibrillation which may influence the estimate of left ventricular function and transvalvular flows.    Cath 11/23/2020:  Coronary Lesion Summary:  Vessel      Stenosis      Vessel Segment  RCA    10 to  30% stenosis      mid    Visit Vitals  /75   Pulse 54   Temp 36.6 °C (97.9 °F) (Temporal)   Resp 18   SpO2 99%   Smoking Status Former        Physical Exam    Airway  Mallampati: III  TM distance: >3 FB  Neck ROM: full     Cardiovascular   Rhythm: regular  Rate: normal     Dental - normal exam     Pulmonary   Breath sounds clear to auscultation     Abdominal - normal exam       Other findings: 3/2023: easy mask, mac 4, 2 attempts, first by ALTA, second by Dr. Joseph           Anesthesia Plan    ASA 3     MAC     intravenous induction   Anesthetic plan and risks discussed with patient.

## 2023-11-17 ASSESSMENT — PAIN SCALES - GENERAL: PAINLEVEL_OUTOF10: 0 - NO PAIN

## 2023-11-21 RX ORDER — METOPROLOL SUCCINATE 100 MG/1
100 TABLET, EXTENDED RELEASE ORAL DAILY
Qty: 270 TABLET | Refills: 0 | OUTPATIENT
Start: 2023-11-21 | End: 2024-11-20

## 2023-11-22 ENCOUNTER — PHARMACY VISIT (OUTPATIENT)
Dept: PHARMACY | Facility: CLINIC | Age: 63
End: 2023-11-22
Payer: COMMERCIAL

## 2023-11-22 PROCEDURE — RXMED WILLOW AMBULATORY MEDICATION CHARGE

## 2023-12-13 DIAGNOSIS — E78.5 HYPERLIPIDEMIA, UNSPECIFIED HYPERLIPIDEMIA TYPE: Primary | ICD-10-CM

## 2023-12-13 PROCEDURE — RXMED WILLOW AMBULATORY MEDICATION CHARGE

## 2023-12-13 RX ORDER — ATORVASTATIN CALCIUM 10 MG/1
10 TABLET, FILM COATED ORAL DAILY
Qty: 90 TABLET | Refills: 3 | Status: SHIPPED | OUTPATIENT
Start: 2023-12-13 | End: 2024-12-12

## 2023-12-14 ENCOUNTER — PHARMACY VISIT (OUTPATIENT)
Dept: PHARMACY | Facility: CLINIC | Age: 63
End: 2023-12-14
Payer: COMMERCIAL

## 2024-02-01 ENCOUNTER — PHARMACY VISIT (OUTPATIENT)
Dept: PHARMACY | Facility: CLINIC | Age: 64
End: 2024-02-01
Payer: COMMERCIAL

## 2024-02-01 PROCEDURE — RXMED WILLOW AMBULATORY MEDICATION CHARGE

## 2024-02-07 ENCOUNTER — PHARMACY VISIT (OUTPATIENT)
Dept: PHARMACY | Facility: CLINIC | Age: 64
End: 2024-02-07
Payer: COMMERCIAL

## 2024-02-07 PROCEDURE — RXMED WILLOW AMBULATORY MEDICATION CHARGE

## 2024-02-27 ENCOUNTER — HOSPITAL ENCOUNTER (OUTPATIENT)
Dept: CARDIOLOGY | Facility: HOSPITAL | Age: 64
Discharge: HOME | End: 2024-02-27
Payer: COMMERCIAL

## 2024-02-27 ENCOUNTER — OFFICE VISIT (OUTPATIENT)
Dept: CARDIOLOGY | Facility: HOSPITAL | Age: 64
End: 2024-02-27
Payer: COMMERCIAL

## 2024-02-27 ENCOUNTER — LAB (OUTPATIENT)
Dept: LAB | Facility: LAB | Age: 64
End: 2024-02-27
Payer: COMMERCIAL

## 2024-02-27 VITALS
HEART RATE: 65 BPM | SYSTOLIC BLOOD PRESSURE: 113 MMHG | WEIGHT: 190 LBS | DIASTOLIC BLOOD PRESSURE: 67 MMHG | HEIGHT: 68 IN | OXYGEN SATURATION: 97 % | BODY MASS INDEX: 28.79 KG/M2

## 2024-02-27 DIAGNOSIS — I50.9 CHRONIC CONGESTIVE HEART FAILURE, UNSPECIFIED HEART FAILURE TYPE (MULTI): ICD-10-CM

## 2024-02-27 DIAGNOSIS — R00.2 PALPITATIONS: ICD-10-CM

## 2024-02-27 DIAGNOSIS — R00.2 PALPITATIONS: Primary | ICD-10-CM

## 2024-02-27 LAB
ALBUMIN SERPL BCP-MCNC: 4.1 G/DL (ref 3.4–5)
ANION GAP SERPL CALC-SCNC: 10 MMOL/L (ref 10–20)
AORTIC VALVE MEAN GRADIENT: 2 MMHG
AORTIC VALVE PEAK VELOCITY: 1.02 M/S
AV PEAK GRADIENT: 4.2 MMHG
AVA (PEAK VEL): 2.81 CM2
AVA (VTI): 3.26 CM2
BNP SERPL-MCNC: 124 PG/ML (ref 0–99)
BUN SERPL-MCNC: 22 MG/DL (ref 6–23)
CALCIUM SERPL-MCNC: 9.2 MG/DL (ref 8.6–10.6)
CHLORIDE SERPL-SCNC: 107 MMOL/L (ref 98–107)
CO2 SERPL-SCNC: 27 MMOL/L (ref 21–32)
CREAT SERPL-MCNC: 1.18 MG/DL (ref 0.5–1.3)
EGFRCR SERPLBLD CKD-EPI 2021: 68 ML/MIN/1.73M*2
EJECTION FRACTION APICAL 4 CHAMBER: 44.9
EJECTION FRACTION: 46 %
GLUCOSE SERPL-MCNC: 86 MG/DL (ref 74–99)
LEFT ATRIUM VOLUME AREA LENGTH INDEX BSA: 34.3 ML/M2
LEFT VENTRICLE INTERNAL DIMENSION DIASTOLE: 4.9 CM (ref 3.5–6)
LEFT VENTRICULAR OUTFLOW TRACT DIAMETER: 2.3 CM
MITRAL VALVE E/A RATIO: 1.6
MITRAL VALVE E/E' RATIO: 6.66
PHOSPHATE SERPL-MCNC: 3.1 MG/DL (ref 2.5–4.9)
POTASSIUM SERPL-SCNC: 4.7 MMOL/L (ref 3.5–5.3)
RIGHT VENTRICLE FREE WALL PEAK S': 9.73 CM/S
SODIUM SERPL-SCNC: 139 MMOL/L (ref 136–145)
TRICUSPID ANNULAR PLANE SYSTOLIC EXCURSION: 2.1 CM

## 2024-02-27 PROCEDURE — 99213 OFFICE O/P EST LOW 20 MIN: CPT | Performed by: INTERNAL MEDICINE

## 2024-02-27 PROCEDURE — 36415 COLL VENOUS BLD VENIPUNCTURE: CPT

## 2024-02-27 PROCEDURE — 93005 ELECTROCARDIOGRAM TRACING: CPT | Performed by: INTERNAL MEDICINE

## 2024-02-27 PROCEDURE — 93306 TTE W/DOPPLER COMPLETE: CPT | Performed by: INTERNAL MEDICINE

## 2024-02-27 PROCEDURE — 1126F AMNT PAIN NOTED NONE PRSNT: CPT | Performed by: INTERNAL MEDICINE

## 2024-02-27 PROCEDURE — 83880 ASSAY OF NATRIURETIC PEPTIDE: CPT

## 2024-02-27 PROCEDURE — 1036F TOBACCO NON-USER: CPT | Performed by: INTERNAL MEDICINE

## 2024-02-27 PROCEDURE — 80069 RENAL FUNCTION PANEL: CPT

## 2024-02-27 PROCEDURE — 1159F MED LIST DOCD IN RCRD: CPT | Performed by: INTERNAL MEDICINE

## 2024-02-27 PROCEDURE — 93010 ELECTROCARDIOGRAM REPORT: CPT | Performed by: INTERNAL MEDICINE

## 2024-02-27 PROCEDURE — 93306 TTE W/DOPPLER COMPLETE: CPT

## 2024-02-27 RX ORDER — DAPAGLIFLOZIN 10 MG/1
10 TABLET, FILM COATED ORAL DAILY
Qty: 30 TABLET | Refills: 11 | Status: SHIPPED | OUTPATIENT
Start: 2024-02-27 | End: 2025-02-26

## 2024-02-27 RX ORDER — SPIRONOLACTONE 25 MG/1
25 TABLET ORAL DAILY
Qty: 30 TABLET | Refills: 11 | Status: SHIPPED | OUTPATIENT
Start: 2024-02-27 | End: 2025-02-26

## 2024-02-27 NOTE — PROGRESS NOTES
63 year old male here for . Referred by .     PM/SHx: palpitations, elevated coronary artery calicum score, MCNAMARA, CHF, cardiomyopathy, Afib, AAA  Social Hx: Denies smoking, ETOH, illicits  Family Hx: Parents had CHF     Interval Hx:   Currently denies chest pain, orthopnea, PND. Patient denies headaches, dizziness or recent falls.   Patient states SOB, MCNAMARA, palpitations, tachycardia, BLE edema presents.       Hospitalizations: Patient denies

## 2024-02-27 NOTE — PROGRESS NOTES
"Chief Complaint:   Follow up visit      History Of Present Illness:    Endy Nuñez is a 63 y.o. male who is here for a follow up visit .     His PMH is listed below   1- Afib /flutter and cardiomyopathy with an EF of 35% ( 11/2020) , at that time he underwent a right and LHC , non obstructive CAD  high filling pressures and CI of 2.2,  subsequently he had a HASEEB guided cardioversion on 11/24 /2020 followed by a dofetilide load .    2-He failed the  dofetilide load and had PVI and RFA of atrial flutter n December 2020 with an EF that improved to 45-50%.    3- Reverted to Afib in 11/2022 , then had a HASEEB guided cardioversion on 12/2022 , was on flecainide and then  amiodarone at that point     4-PVI , posterior and anterior wall scar modification RFA done on 3/2023 . Monitor placed afterwards did not show recurrence of Afib but he did have SVTs . His GDMT was adjusted by the EP team in the setting of dizziness ( drop in entresto dose )   Amiodarone was stopped October 2023     In addition he has a history of Sleep apnea ( not using the CPAP ) and pulmonary nodules ( most likely of benign etiology ) He is here because of increased shortness of breath .   He is SOB on mild exertion , in addition is reporting orthopnea , PND   Has been taking double his usual dose of loop diuretics   His most recent echocardiogram dates from November 2021 and his EF was 45-50% then .  EKG from today shows NSR at a rate of 64 bpm   HASEEB ( 12/2022)    1. Left ventricular systolic function is low normal with a 50% estimated ejection fraction.   2. The left atrium is moderately dilated.   3. There is no thrombus visualized in the left atrial appendage.   4. The patient is in atrial fibrillation which may influence the estimate of left ventricular function and transvalvular flows.           Last Recorded Vitals:  Vitals:    02/27/24 0815   BP: 113/67   Pulse: 65   SpO2: 97%   Weight: 86.2 kg (190 lb)   Height: 1.727 m (5' 8\")       Past " Medical History:  He has a past medical history of Atrial fibrillation (CMS/HCC), Cardiomyopathy (CMS/HCC), CHF (congestive heart failure) (CMS/[facility]), Hyperlipidemia, Hypertension, Mild CAD (11/23/2020), and Sleep apnea.    Past Surgical History:  He has a past surgical history that includes Rotator cuff repair; Ankle surgery; Hand tendon surgery (10/19/2015); Other surgical history (02/11/2021 and 2/2023); and Cardiac electrophysiology mapping and ablation (03/06/2023).      Social History:  He reports that he quit smoking about 19 years ago. His smoking use included cigarettes. He smoked an average of 1 pack per day. He has never used smokeless tobacco. He reports current alcohol use of about 2.0 standard drinks of alcohol per week. He reports that he does not use drugs.    Family History:  Family History   Problem Relation Name Age of Onset    Atrial fibrillation Mother      Heart failure Mother          Allergies:  Patient has no known allergies.    Outpatient Medications:  Current Outpatient Medications   Medication Instructions    apixaban (Eliquis) 5 mg tablet TAKE 1 TABLET BY MOUTH TWO TIMES A DAY    atorvastatin (LIPITOR) 10 mg, oral, Daily, as directed    Entresto  mg tablet 0.5 tablets, oral, 2 times daily    furosemide (Lasix) 20 mg tablet TAKE 1 TABLET BY MOUTH ONCE DAILY    metoprolol succinate XL (Toprol-XL) 100 mg 24 hr tablet TAKE 1 TABLET BY MOUTH ONCE DAILY    polyethylene glycol-electrolytes (GaviLyte-G) 420 gram solution Please refer to the printed instructions that were mailed to you.    timolol (Timoptic) 0.5 % ophthalmic solution USE 1 DROP IN THE RIGHT EYE EVERY MORNING.       Physical Exam:  Gen: NAD , Aox3  HEENT: JVP at the clavicle   Heart: regular , no murmurs   Lungs : clear resonant , normal air entry bilaterally   Abdomen : soft , non tender   Ext : warm , +1 pitting edema to the knee bilaterally   Neuro: grossly intact        Last Labs:  CBC -  Lab Results   Component Value  Date    WBC 6.4 10/27/2023    HGB 13.5 10/27/2023    HCT 41.4 10/27/2023    MCV 93 10/27/2023     10/27/2023       CMP -  Lab Results   Component Value Date    CALCIUM 8.9 10/27/2023    PHOS 3.4 11/15/2022    PROT 6.5 10/27/2023    ALBUMIN 4.2 10/27/2023    AST 20 10/27/2023    ALT 15 10/27/2023    ALKPHOS 44 10/27/2023    BILITOT 0.6 10/27/2023       LIPID PANEL -   Lab Results   Component Value Date    CHOL 196 09/25/2023    TRIG 162 (H) 09/25/2023    HDL 70.3 09/25/2023    CHHDL 2.8 09/25/2023    LDLF 93 09/25/2023    VLDL 32 09/25/2023       RENAL FUNCTION PANEL -   Lab Results   Component Value Date    GLUCOSE 59 (L) 10/27/2023     10/27/2023    K 4.5 10/27/2023     10/27/2023    CO2 26 10/27/2023    ANIONGAP 12 10/27/2023    BUN 20 10/27/2023    CREATININE 1.20 10/27/2023    GFRMALE 64 07/31/2023    CALCIUM 8.9 10/27/2023    PHOS 3.4 11/15/2022    ALBUMIN 4.2 10/27/2023        Lab Results   Component Value Date     (H) 11/15/2022     A/P   Mr Nuñez is a 63 year old man with a PMH significant for Afib /flutter since 2020 who has had cardioversions and PVI performed , the last one being in March 2023  , most recent EF on his echocardiogram from 2021 was 45-50%   Chief complaint is worsening shortness of breath   Mildly hypervolemic on examination today     HFpEF   Will optimize his GDMT , will add farxiga 10 mg daily along with spironolactone 25 mg daily which had previously been held .   Will continue toprol Xl 100 mg daily and entresto  mg BID   He is currently taking 40 mg of lasix PO daily and will increase that to 40 mg BID until he looses around 3-4 pounds   Recheck a renal function panel and BNP level   Recheck an echocardiogram     Sleep apnea   Untreated sleep apnea is a concern , suspect nocturnal arrhythmia and  of SOB , reinforced need to comply with CPAP therapy     Afib   Normal Sinus rhythm on EKG today but has episodes of reported Afib on his apple watch ,  mainly nocturnal Will inform Dr Francisco     Follow up in a couple of weeks to reevaluate         Eliana Cochran MD

## 2024-02-27 NOTE — PATIENT INSTRUCTIONS
To reach Dr. Cochran's office please call [phone] (Cayla). Fax [phone]. Call [phone] to schedule an appointment. You may also contact the HF RNs at [email or web address] <[email or web address]  (Please include your name and date of birth)    Thank you for coming to your appointment today. If you have any questions or need cardiac medication refills, please call the Heart Failure Office at [phone] option [phone].   You may also contact the HF RNs at [email or web address] <[email or web address]>    Please have your labs drawn today  (BNP, RFP)  Please have your echocardiogram today   Please increase your lasix to 40mg twice a day (until you loose 3-4lbs)   Please START Sprionolactone 25mg daily   Please START Farxiga 10mg Daily   Please schedule a follow up with Dr. Cochran

## 2024-03-05 LAB
ATRIAL RATE: 64 BPM
P AXIS: 34 DEGREES
P OFFSET: 175 MS
P ONSET: 138 MS
PR INTERVAL: 170 MS
Q ONSET: 223 MS
QRS COUNT: 11 BEATS
QRS DURATION: 82 MS
QT INTERVAL: 448 MS
QTC CALCULATION(BAZETT): 462 MS
QTC FREDERICIA: 457 MS
R AXIS: -12 DEGREES
T AXIS: -21 DEGREES
T OFFSET: 447 MS
VENTRICULAR RATE: 64 BPM

## 2024-04-02 ENCOUNTER — APPOINTMENT (OUTPATIENT)
Dept: CARDIOLOGY | Facility: HOSPITAL | Age: 64
End: 2024-04-02
Payer: COMMERCIAL

## 2024-04-02 DIAGNOSIS — I50.9 CONGESTIVE HEART FAILURE, UNSPECIFIED HF CHRONICITY, UNSPECIFIED HEART FAILURE TYPE (MULTI): Primary | ICD-10-CM

## 2024-04-02 RX ORDER — SACUBITRIL AND VALSARTAN 97; 103 MG/1; MG/1
1 TABLET, FILM COATED ORAL 2 TIMES DAILY
Qty: 60 TABLET | Refills: 11 | Status: SHIPPED | OUTPATIENT
Start: 2024-04-02

## 2024-04-03 ENCOUNTER — DOCUMENTATION (OUTPATIENT)
Dept: CARDIOLOGY | Facility: HOSPITAL | Age: 64
End: 2024-04-03
Payer: COMMERCIAL

## 2024-04-15 ENCOUNTER — PHARMACY VISIT (OUTPATIENT)
Dept: PHARMACY | Facility: CLINIC | Age: 64
End: 2024-04-15
Payer: COMMERCIAL

## 2024-04-15 PROCEDURE — RXMED WILLOW AMBULATORY MEDICATION CHARGE

## 2024-04-26 ENCOUNTER — OFFICE VISIT (OUTPATIENT)
Dept: CARDIOLOGY | Facility: CLINIC | Age: 64
End: 2024-04-26
Payer: COMMERCIAL

## 2024-04-26 VITALS
DIASTOLIC BLOOD PRESSURE: 77 MMHG | SYSTOLIC BLOOD PRESSURE: 108 MMHG | HEIGHT: 68 IN | HEART RATE: 90 BPM | WEIGHT: 190.7 LBS | BODY MASS INDEX: 28.9 KG/M2 | OXYGEN SATURATION: 95 %

## 2024-04-26 DIAGNOSIS — I48.91 ATRIAL FIBRILLATION, UNSPECIFIED TYPE (MULTI): Primary | ICD-10-CM

## 2024-04-26 PROCEDURE — 99214 OFFICE O/P EST MOD 30 MIN: CPT | Performed by: NURSE PRACTITIONER

## 2024-04-26 PROCEDURE — 1126F AMNT PAIN NOTED NONE PRSNT: CPT | Performed by: NURSE PRACTITIONER

## 2024-04-26 PROCEDURE — 1160F RVW MEDS BY RX/DR IN RCRD: CPT | Performed by: NURSE PRACTITIONER

## 2024-04-26 PROCEDURE — 93010 ELECTROCARDIOGRAM REPORT: CPT | Performed by: INTERNAL MEDICINE

## 2024-04-26 PROCEDURE — 1159F MED LIST DOCD IN RCRD: CPT | Performed by: NURSE PRACTITIONER

## 2024-04-26 PROCEDURE — 93005 ELECTROCARDIOGRAM TRACING: CPT | Performed by: NURSE PRACTITIONER

## 2024-04-26 PROCEDURE — 1036F TOBACCO NON-USER: CPT | Performed by: NURSE PRACTITIONER

## 2024-04-26 RX ORDER — AMIODARONE HYDROCHLORIDE 200 MG/1
200 TABLET ORAL DAILY
Qty: 90 TABLET | Refills: 3 | Status: SHIPPED | OUTPATIENT
Start: 2024-04-26 | End: 2024-05-28 | Stop reason: SINTOL

## 2024-04-26 ASSESSMENT — ENCOUNTER SYMPTOMS
NEAR-SYNCOPE: 0
DIAPHORESIS: 0
FEVER: 0
FALLS: 0
DIZZINESS: 0
ORTHOPNEA: 0
HEADACHES: 0
DIARRHEA: 0
SNORING: 0
PALPITATIONS: 1
DYSPNEA ON EXERTION: 1
DOUBLE VISION: 0
BLURRED VISION: 0
DEPRESSION: 0
WEAKNESS: 0
NAUSEA: 0
SYNCOPE: 0
LIGHT-HEADEDNESS: 0
COUGH: 0
LOSS OF SENSATION IN FEET: 0
HEMOPTYSIS: 0
ABDOMINAL PAIN: 0
SHORTNESS OF BREATH: 0
MYALGIAS: 0
SORE THROAT: 0
SPUTUM PRODUCTION: 0
BLOATING: 0
OCCASIONAL FEELINGS OF UNSTEADINESS: 0
VOMITING: 0
PND: 0

## 2024-04-26 ASSESSMENT — PAIN SCALES - GENERAL: PAINLEVEL: 0-NO PAIN

## 2024-04-26 NOTE — PROGRESS NOTES
Subjective   Endy Nuñez is a 63 y.o. male.    Chief Complaint:  Follow-up, Palpitations, and Shortness of Breath    Endy Nuñez is a 63 year old with:     1. AF and AFL - index diagnosis in November of 2020. He underwent HASEEB guided cardioversion 11/24/2020. He reverted into atrial flutter right afterwards and was started on Dofetilide. The dose had to be reduced to 125 mcg because of the prolong QT but he still continue to have the episodes of AF and AFL. At the same time he was diagnosed with EF of 35 % and underwent SG guided CHF treatment    On 12/08/2020 he underwent PVI and RFA of atrial flutter. He is feeling better and better as the days go by. ECHO 12/17/2020 showed EF of 45-50%.   He continued to have episodes of fast heart rate on his Fitbit. . A monitor that he wore in February of 2021 shows 94% sinus rhythm.   FEB 2021: He has tachycardia induced cardiomyopathy. His predominant rhythm was atrial flutter/tach and AF. He failed Tikosyn.  ECHO: TTE (October of 2021) LVEF 60-65%. Ascending aorta 4.1 cm. Mild LVH. No significant valve disease. LVIDd 4.25 cm.     Flecainide was started at Dr. Cochran's last visit 11/15/2022 what he was found to be back in Afib.  He then underwent successful HASEEB guided DCCV on 12/8/2022  At some point the Flecainide was switched to Amiodarone     Now s/p redo PVI, posterior and anterior wall scar modification RFA with Dr. Francisco 3/6/2023  ECG 4/18/2023 SB HR 52 bpm  Amiodarone was stopped post ablation  ECG 6/30/2023 SB Hr 56 bpm normal intervals. Amiodarone was resumed due to recurrent arrhythmia symptoms and abnormal ECGs on his smart watch      Holter monitor 6/30-7/13/23 SB-ST, max  bpm, minimum HR 37bpm, avg 60 bpm, <1% VE burden, 1% SVE burden, 211 SVT episodes, longest 481 beats, fastest 161 on 6/30. SVT episodes appear to be Atach  ECG 7/28/2023 NSR HR 54 bpm, QTc 479 ms  Patient had low BP and was dizzy at the visit in July.  Lab work revealed some mild  "anemia and his Entresto dose was cut in half.  Patient just got a refill of his medication and requested that he cut his current pills in half instead of getting a new prescription.    ECG 10/27/2023 SB with PAC HR 58 bpm, Qtc 486 ms, amiodarone was stopped  ECG 4/26/2024 Atrial Flutter  bpm    TODAY Patient presents for 6 month follow up.  Unfortunately, he is back out of normal rhythm and in atrial flutter.  He admits to palpitations, SOB on exertion at times and some loss of activity tolerance. He saw Dr. Cochran last month and his Entresto was increased back up to the full dose again.     /77 (BP Location: Left arm, Patient Position: Sitting, BP Cuff Size: Adult)   Pulse 90   Ht 1.727 m (5' 8\")   Wt 86.5 kg (190 lb 11.2 oz)   SpO2 95%   BMI 29.00 kg/m²   Current Outpatient Medications on File Prior to Visit   Medication Sig Dispense Refill    apixaban (Eliquis) 5 mg tablet TAKE 1 TABLET BY MOUTH TWO TIMES A  tablet 0    atorvastatin (Lipitor) 10 mg tablet Take 1 tablet (10 mg) by mouth once daily. as directed 90 tablet 3    dapagliflozin propanediol (Farxiga) 10 mg Take 1 tablet (10 mg) by mouth once daily. 30 tablet 11    Entresto  mg tablet Take 1 tablet by mouth 2 times a day. 60 tablet 11    furosemide (Lasix) 20 mg tablet TAKE 1 TABLET BY MOUTH ONCE DAILY 270 tablet 0    metoprolol succinate XL (Toprol-XL) 100 mg 24 hr tablet TAKE 1 TABLET BY MOUTH ONCE DAILY 270 tablet 0    polyethylene glycol-electrolytes (GaviLyte-G) 420 gram solution Please refer to the printed instructions that were mailed to you. 4000 mL 0    spironolactone (Aldactone) 25 mg tablet Take 1 tablet (25 mg) by mouth once daily. 30 tablet 11    timolol (Timoptic) 0.5 % ophthalmic solution USE 1 DROP IN THE RIGHT EYE EVERY MORNING. 55 mL 0     No current facility-administered medications on file prior to visit.         Review of Systems   Constitutional: Positive for malaise/fatigue. Negative for diaphoresis and " fever.   HENT:  Negative for congestion and sore throat.    Eyes:  Negative for blurred vision and double vision.   Cardiovascular:  Positive for dyspnea on exertion and palpitations. Negative for chest pain, leg swelling, near-syncope, orthopnea, paroxysmal nocturnal dyspnea and syncope.   Respiratory:  Negative for cough, hemoptysis, shortness of breath, snoring and sputum production.    Hematologic/Lymphatic: Negative for bleeding problem.   Skin:  Negative for rash.   Musculoskeletal:  Negative for falls, joint pain and myalgias.   Gastrointestinal:  Negative for bloating, abdominal pain, diarrhea, nausea and vomiting.   Neurological:  Negative for dizziness, headaches, light-headedness and weakness.   All other systems reviewed and are negative.      Objective   Constitutional:       Appearance: Healthy appearance. Not in distress.   Eyes:      Conjunctiva/sclera: Conjunctivae normal.   HENT:      Nose: Nose normal.    Mouth/Throat:      Pharynx: Oropharynx is clear.   Pulmonary:      Effort: Pulmonary effort is normal.      Breath sounds: Normal breath sounds. No wheezing. No rhonchi.   Chest:      Chest wall: Not tender to palpatation.   Cardiovascular:      Normal rate. Irregularly irregular rhythm.      Murmurs: There is no murmur.      No rub.   Pulses:     Intact distal pulses.   Edema:     Peripheral edema absent.   Abdominal:      General: Bowel sounds are normal.      Palpations: Abdomen is soft.   Musculoskeletal: Normal range of motion.      Cervical back: Neck supple. Skin:     General: Skin is warm and dry.   Neurological:      Mental Status: Alert and oriented to person, place and time.      Motor: Motor function is intact.         Lab Review:   Lab Results   Component Value Date     02/27/2024    K 4.7 02/27/2024     02/27/2024    CO2 27 02/27/2024    BUN 22 02/27/2024    CREATININE 1.18 02/27/2024    GLUCOSE 86 02/27/2024    CALCIUM 9.2 02/27/2024     Lab Results   Component Value  Date    WBC 6.4 10/27/2023    HGB 13.5 10/27/2023    HCT 41.4 10/27/2023    MCV 93 10/27/2023     10/27/2023       Assessment/Plan   The encounter diagnosis was Atrial fibrillation, unspecified type (Multi).  Patient unfortunately reverted back into Atrial flutter since our last visit.   I discussed with Dr. Francisco, and we will restart amiodarone at this time.  He has a history of cardiomyopathy in the setting of Afib/Aflutter, and I believe he would benefit from rhythm control.  Patient previously failed Flecainide and Dofetilide    Start Amiodarone 200 mg daily, follow up with me in 3-4 weeks to reassess rhythm.  If the medication does not convert him, we will proceed with cardioversion.  Eliquis compliance reinforced, patient denies missing any doses.  Patient with normal hepatic and thyroid function tests within the last year.  We will recheck these at our next appointment along with a PFT.  Patient would also benefit from yearly eye exams while on amiodarone.

## 2024-05-02 ENCOUNTER — PHARMACY VISIT (OUTPATIENT)
Dept: PHARMACY | Facility: CLINIC | Age: 64
End: 2024-05-02
Payer: COMMERCIAL

## 2024-05-02 LAB
ATRIAL RATE: 267 BPM
P AXIS: 81 DEGREES
P OFFSET: 202 MS
P ONSET: 168 MS
Q ONSET: 224 MS
QRS COUNT: 17 BEATS
QRS DURATION: 78 MS
QT INTERVAL: 348 MS
QTC CALCULATION(BAZETT): 459 MS
QTC FREDERICIA: 419 MS
R AXIS: 3 DEGREES
T AXIS: -30 DEGREES
T OFFSET: 398 MS
VENTRICULAR RATE: 105 BPM

## 2024-05-02 PROCEDURE — RXMED WILLOW AMBULATORY MEDICATION CHARGE

## 2024-05-06 DIAGNOSIS — I50.9 CHRONIC CONGESTIVE HEART FAILURE, UNSPECIFIED HEART FAILURE TYPE (MULTI): Primary | ICD-10-CM

## 2024-05-08 PROCEDURE — RXMED WILLOW AMBULATORY MEDICATION CHARGE

## 2024-05-08 RX ORDER — FUROSEMIDE 20 MG/1
20 TABLET ORAL DAILY
Qty: 270 TABLET | Refills: 0 | Status: SHIPPED | OUTPATIENT
Start: 2024-05-08 | End: 2025-05-07

## 2024-05-09 ENCOUNTER — PHARMACY VISIT (OUTPATIENT)
Dept: PHARMACY | Facility: CLINIC | Age: 64
End: 2024-05-09
Payer: COMMERCIAL

## 2024-05-14 PROCEDURE — RXMED WILLOW AMBULATORY MEDICATION CHARGE

## 2024-05-17 ENCOUNTER — OFFICE VISIT (OUTPATIENT)
Dept: PRIMARY CARE | Facility: CLINIC | Age: 64
End: 2024-05-17
Payer: COMMERCIAL

## 2024-05-17 VITALS
SYSTOLIC BLOOD PRESSURE: 102 MMHG | BODY MASS INDEX: 28.19 KG/M2 | HEART RATE: 64 BPM | HEIGHT: 68 IN | DIASTOLIC BLOOD PRESSURE: 64 MMHG | OXYGEN SATURATION: 98 % | WEIGHT: 186 LBS

## 2024-05-17 DIAGNOSIS — E78.5 HYPERLIPIDEMIA, UNSPECIFIED HYPERLIPIDEMIA TYPE: ICD-10-CM

## 2024-05-17 DIAGNOSIS — D64.9 ANEMIA, UNSPECIFIED TYPE: Primary | ICD-10-CM

## 2024-05-17 PROCEDURE — 99214 OFFICE O/P EST MOD 30 MIN: CPT | Performed by: FAMILY MEDICINE

## 2024-05-17 PROCEDURE — 1159F MED LIST DOCD IN RCRD: CPT | Performed by: FAMILY MEDICINE

## 2024-05-17 PROCEDURE — 1160F RVW MEDS BY RX/DR IN RCRD: CPT | Performed by: FAMILY MEDICINE

## 2024-05-17 ASSESSMENT — ENCOUNTER SYMPTOMS
MUSCULOSKELETAL NEGATIVE: 1
RESPIRATORY NEGATIVE: 1
CARDIOVASCULAR NEGATIVE: 1
CONSTITUTIONAL NEGATIVE: 1
GASTROINTESTINAL NEGATIVE: 1
NEUROLOGICAL NEGATIVE: 1

## 2024-05-17 ASSESSMENT — PATIENT HEALTH QUESTIONNAIRE - PHQ9
SUM OF ALL RESPONSES TO PHQ9 QUESTIONS 1 AND 2: 0
2. FEELING DOWN, DEPRESSED OR HOPELESS: NOT AT ALL
1. LITTLE INTEREST OR PLEASURE IN DOING THINGS: NOT AT ALL

## 2024-05-17 NOTE — PROGRESS NOTES
Subjective   Patient ID: Endy Nuñez is a 63 y.o. male who presents for Follow-up (6 months).  HPI  Patient noted to have some mild anemia needs a workup.  He is taking Eliquis and atorvastatin.  Review of Systems   Constitutional: Negative.    HENT: Negative.     Respiratory: Negative.     Cardiovascular: Negative.    Gastrointestinal: Negative.    Genitourinary: Negative.    Musculoskeletal: Negative.    Neurological: Negative.        Objective   Physical Exam  Constitutional:       Appearance: Normal appearance.   HENT:      Head: Normocephalic and atraumatic.      Mouth/Throat:      Mouth: Mucous membranes are moist.   Cardiovascular:      Rate and Rhythm: Normal rate and regular rhythm.      Pulses: Normal pulses.      Heart sounds: Normal heart sounds.   Pulmonary:      Effort: Pulmonary effort is normal.   Musculoskeletal:         General: Normal range of motion.   Neurological:      Mental Status: He is alert.         Assessment/Plan   Problem List Items Addressed This Visit    None  Visit Diagnoses         Codes    Anemia, unspecified type    -  Primary D64.9    Relevant Orders    Comprehensive Metabolic Panel    Lipid Panel    Folate    Vitamin B12    Iron and TIBC    CBC    Hyperlipidemia, unspecified hyperlipidemia type     E78.5    Relevant Orders    Comprehensive Metabolic Panel    Lipid Panel    Folate    Vitamin B12    Iron and TIBC    CBC                 Finn Renteria DO 05/17/24 8:48 AM

## 2024-05-20 ENCOUNTER — LAB (OUTPATIENT)
Dept: LAB | Facility: LAB | Age: 64
End: 2024-05-20
Payer: COMMERCIAL

## 2024-05-20 DIAGNOSIS — D64.9 ANEMIA, UNSPECIFIED TYPE: ICD-10-CM

## 2024-05-20 DIAGNOSIS — E78.5 HYPERLIPIDEMIA, UNSPECIFIED HYPERLIPIDEMIA TYPE: ICD-10-CM

## 2024-05-20 DIAGNOSIS — Z51.81 ENCOUNTER FOR MONITORING AMIODARONE THERAPY: ICD-10-CM

## 2024-05-20 DIAGNOSIS — Z79.899 ENCOUNTER FOR MONITORING AMIODARONE THERAPY: ICD-10-CM

## 2024-05-20 LAB
ALBUMIN SERPL BCP-MCNC: 4 G/DL (ref 3.4–5)
ALP SERPL-CCNC: 55 U/L (ref 33–136)
ALT SERPL W P-5'-P-CCNC: 11 U/L (ref 10–52)
ANION GAP SERPL CALC-SCNC: 14 MMOL/L (ref 10–20)
AST SERPL W P-5'-P-CCNC: 16 U/L (ref 9–39)
BILIRUB SERPL-MCNC: 0.6 MG/DL (ref 0–1.2)
BUN SERPL-MCNC: 20 MG/DL (ref 6–23)
CALCIUM SERPL-MCNC: 8.7 MG/DL (ref 8.6–10.6)
CHLORIDE SERPL-SCNC: 106 MMOL/L (ref 98–107)
CHOLEST SERPL-MCNC: 140 MG/DL (ref 0–199)
CHOLESTEROL/HDL RATIO: 2.6
CO2 SERPL-SCNC: 22 MMOL/L (ref 21–32)
CREAT SERPL-MCNC: 1.2 MG/DL (ref 0.5–1.3)
EGFRCR SERPLBLD CKD-EPI 2021: 67 ML/MIN/1.73M*2
ERYTHROCYTE [DISTWIDTH] IN BLOOD BY AUTOMATED COUNT: 13.3 % (ref 11.5–14.5)
FOLATE SERPL-MCNC: 22.1 NG/ML
GLUCOSE SERPL-MCNC: 97 MG/DL (ref 74–99)
HCT VFR BLD AUTO: 40.7 % (ref 41–52)
HDLC SERPL-MCNC: 53.6 MG/DL
HGB BLD-MCNC: 13.3 G/DL (ref 13.5–17.5)
IRON SATN MFR SERPL: 22 % (ref 25–45)
IRON SERPL-MCNC: 78 UG/DL (ref 35–150)
LDLC SERPL CALC-MCNC: 74 MG/DL
MCH RBC QN AUTO: 28.9 PG (ref 26–34)
MCHC RBC AUTO-ENTMCNC: 32.7 G/DL (ref 32–36)
MCV RBC AUTO: 88 FL (ref 80–100)
NON HDL CHOLESTEROL: 86 MG/DL (ref 0–149)
NRBC BLD-RTO: 0 /100 WBCS (ref 0–0)
PLATELET # BLD AUTO: 219 X10*3/UL (ref 150–450)
POTASSIUM SERPL-SCNC: 4.7 MMOL/L (ref 3.5–5.3)
PROT SERPL-MCNC: 6.5 G/DL (ref 6.4–8.2)
RBC # BLD AUTO: 4.61 X10*6/UL (ref 4.5–5.9)
SODIUM SERPL-SCNC: 137 MMOL/L (ref 136–145)
TIBC SERPL-MCNC: 348 UG/DL (ref 240–445)
TRIGL SERPL-MCNC: 64 MG/DL (ref 0–149)
UIBC SERPL-MCNC: 270 UG/DL (ref 110–370)
VIT B12 SERPL-MCNC: 174 PG/ML (ref 211–911)
VLDL: 13 MG/DL (ref 0–40)
WBC # BLD AUTO: 6.5 X10*3/UL (ref 4.4–11.3)

## 2024-05-20 PROCEDURE — 80053 COMPREHEN METABOLIC PANEL: CPT

## 2024-05-20 PROCEDURE — 82607 VITAMIN B-12: CPT

## 2024-05-20 PROCEDURE — 82746 ASSAY OF FOLIC ACID SERUM: CPT

## 2024-05-20 PROCEDURE — 84439 ASSAY OF FREE THYROXINE: CPT

## 2024-05-20 PROCEDURE — 84443 ASSAY THYROID STIM HORMONE: CPT

## 2024-05-20 PROCEDURE — 36415 COLL VENOUS BLD VENIPUNCTURE: CPT

## 2024-05-20 PROCEDURE — 83550 IRON BINDING TEST: CPT

## 2024-05-20 PROCEDURE — 83540 ASSAY OF IRON: CPT

## 2024-05-20 PROCEDURE — 80061 LIPID PANEL: CPT

## 2024-05-20 PROCEDURE — 85027 COMPLETE CBC AUTOMATED: CPT

## 2024-05-22 ENCOUNTER — PHARMACY VISIT (OUTPATIENT)
Dept: PHARMACY | Facility: CLINIC | Age: 64
End: 2024-05-22
Payer: COMMERCIAL

## 2024-05-24 ENCOUNTER — OFFICE VISIT (OUTPATIENT)
Dept: CARDIOLOGY | Facility: CLINIC | Age: 64
End: 2024-05-24
Payer: COMMERCIAL

## 2024-05-24 ENCOUNTER — LAB (OUTPATIENT)
Dept: LAB | Facility: LAB | Age: 64
End: 2024-05-24
Payer: COMMERCIAL

## 2024-05-24 VITALS
WEIGHT: 185.06 LBS | DIASTOLIC BLOOD PRESSURE: 70 MMHG | HEIGHT: 68 IN | SYSTOLIC BLOOD PRESSURE: 105 MMHG | HEART RATE: 81 BPM | OXYGEN SATURATION: 94 % | BODY MASS INDEX: 28.05 KG/M2

## 2024-05-24 DIAGNOSIS — Z51.81 ENCOUNTER FOR MONITORING AMIODARONE THERAPY: ICD-10-CM

## 2024-05-24 DIAGNOSIS — I48.91 ATRIAL FIBRILLATION, UNSPECIFIED TYPE (MULTI): Primary | ICD-10-CM

## 2024-05-24 DIAGNOSIS — Z79.899 ENCOUNTER FOR MONITORING AMIODARONE THERAPY: ICD-10-CM

## 2024-05-24 DIAGNOSIS — G47.30 SLEEP APNEA, UNSPECIFIED TYPE: ICD-10-CM

## 2024-05-24 DIAGNOSIS — I48.91 ATRIAL FIBRILLATION, UNSPECIFIED TYPE (MULTI): ICD-10-CM

## 2024-05-24 LAB
T4 FREE SERPL-MCNC: 2.47 NG/DL (ref 0.78–1.48)
T4 FREE SERPL-MCNC: 2.65 NG/DL (ref 0.78–1.48)
TSH SERPL-ACNC: 0.03 MIU/L (ref 0.44–3.98)
TSH SERPL-ACNC: 0.1 MIU/L (ref 0.44–3.98)

## 2024-05-24 PROCEDURE — 1160F RVW MEDS BY RX/DR IN RCRD: CPT | Performed by: NURSE PRACTITIONER

## 2024-05-24 PROCEDURE — 84439 ASSAY OF FREE THYROXINE: CPT

## 2024-05-24 PROCEDURE — 99214 OFFICE O/P EST MOD 30 MIN: CPT | Performed by: NURSE PRACTITIONER

## 2024-05-24 PROCEDURE — 84443 ASSAY THYROID STIM HORMONE: CPT

## 2024-05-24 PROCEDURE — 1126F AMNT PAIN NOTED NONE PRSNT: CPT | Performed by: NURSE PRACTITIONER

## 2024-05-24 PROCEDURE — 93005 ELECTROCARDIOGRAM TRACING: CPT | Performed by: NURSE PRACTITIONER

## 2024-05-24 PROCEDURE — 1036F TOBACCO NON-USER: CPT | Performed by: NURSE PRACTITIONER

## 2024-05-24 PROCEDURE — 1159F MED LIST DOCD IN RCRD: CPT | Performed by: NURSE PRACTITIONER

## 2024-05-24 PROCEDURE — 36415 COLL VENOUS BLD VENIPUNCTURE: CPT

## 2024-05-24 ASSESSMENT — PATIENT HEALTH QUESTIONNAIRE - PHQ9
1. LITTLE INTEREST OR PLEASURE IN DOING THINGS: NOT AT ALL
2. FEELING DOWN, DEPRESSED OR HOPELESS: NOT AT ALL
SUM OF ALL RESPONSES TO PHQ9 QUESTIONS 1 AND 2: 0

## 2024-05-24 ASSESSMENT — ENCOUNTER SYMPTOMS
LOSS OF SENSATION IN FEET: 0
OCCASIONAL FEELINGS OF UNSTEADINESS: 0
DEPRESSION: 0

## 2024-05-24 ASSESSMENT — COLUMBIA-SUICIDE SEVERITY RATING SCALE - C-SSRS
2. HAVE YOU ACTUALLY HAD ANY THOUGHTS OF KILLING YOURSELF?: NO
1. IN THE PAST MONTH, HAVE YOU WISHED YOU WERE DEAD OR WISHED YOU COULD GO TO SLEEP AND NOT WAKE UP?: NO
6. HAVE YOU EVER DONE ANYTHING, STARTED TO DO ANYTHING, OR PREPARED TO DO ANYTHING TO END YOUR LIFE?: NO

## 2024-05-24 ASSESSMENT — PAIN SCALES - GENERAL: PAINLEVEL: 0-NO PAIN

## 2024-05-24 NOTE — LETTER
Dear Dr. Joseph:    Ezekieldanya Savannah GARCIA 1960 was seen in my office 2024 for follow up of his atrial fibrillation.  He is scheduled for a dental procedure with you 2024.  He is clear to hold his Eliquis for 48 hours prior to the procedure and resume it within 24 hours afterward.    If you have any questions, feel free to reach out to the office      Sincerely,          Kaylyn ORDAZ-CNP  [phone]

## 2024-05-24 NOTE — PATIENT INSTRUCTIONS
Thank you for coming to see me!  We will get you set up for a cardioversion with Dr. Francisco.  I will call you when we get a date arranged and plan around your dental work as best we can  Continue current medications  Your recent kidney and liver function looks good, we will also check your thyroid function  While on amiodarone, make sure you get an eye exam once a year  We will also get a pulmonary function test to access your lungs while on amiodarone. Please call [phone] to schedule at a location near you    I will be in touch with an updates and date for the  cardioversion  Nothing to eat after midnight before the cardioversion, but you can take your medications with a sip of water.  It is important you do not miss any Eliquis prior to the procedure    Kaylyn Potter APRN--[phone]

## 2024-05-28 ENCOUNTER — TELEPHONE (OUTPATIENT)
Dept: CARDIOLOGY | Facility: HOSPITAL | Age: 64
End: 2024-05-28
Payer: COMMERCIAL

## 2024-05-28 DIAGNOSIS — E05.90 HYPERTHYROIDISM: Primary | ICD-10-CM

## 2024-05-28 NOTE — TELEPHONE ENCOUNTER
Spoke to patient about his repeat thyroid function tests.   Test results are showing signs of hyperthyroidism.  I discussed with Dr. Francisco, we will stop his amiodarone and refer him to endocrinology.  He has only been back on amiodarone for 6 weeks, it is unclear if the medication is the cause of the hyperthyroidism.   Patient expresses understanding and will stop the medication

## 2024-05-29 PROCEDURE — RXMED WILLOW AMBULATORY MEDICATION CHARGE

## 2024-05-29 RX ORDER — METOPROLOL SUCCINATE 100 MG/1
100 TABLET, EXTENDED RELEASE ORAL 2 TIMES DAILY
Qty: 180 TABLET | Refills: 3 | Status: SHIPPED | OUTPATIENT
Start: 2024-05-29 | End: 2025-05-29

## 2024-05-29 ASSESSMENT — ENCOUNTER SYMPTOMS
SPUTUM PRODUCTION: 0
NAUSEA: 0
MYALGIAS: 0
HEADACHES: 0
FALLS: 0
ORTHOPNEA: 0
DYSPNEA ON EXERTION: 1
BLOATING: 0
WEAKNESS: 0
PND: 0
SNORING: 0
DIARRHEA: 0
COUGH: 0
PALPITATIONS: 1
SYNCOPE: 0
NEAR-SYNCOPE: 0
BLURRED VISION: 0
HEMOPTYSIS: 0
DIZZINESS: 0
ABDOMINAL PAIN: 0
DOUBLE VISION: 0
SHORTNESS OF BREATH: 0
SORE THROAT: 0
VOMITING: 0
FEVER: 0
DIAPHORESIS: 0
LIGHT-HEADEDNESS: 0

## 2024-05-29 NOTE — PROGRESS NOTES
Subjective   Endy Nuñez is a 63 y.o. male.    Chief Complaint:  Follow-up, Palpitations, Atrial Fibrillation, and Heart Failure    Endy Nuñez is a 63 year old with:     1. AF and AFL - index diagnosis in November of 2020. He underwent HASEEB guided cardioversion 11/24/2020. He reverted into atrial flutter right afterwards and was started on Dofetilide. The dose had to be reduced to 125 mcg because of the prolong QT but he still continue to have the episodes of AF and AFL. At the same time he was diagnosed with EF of 35 % and underwent SG guided CHF treatment    On 12/08/2020 he underwent PVI and RFA of atrial flutter. ECHO 12/17/2020 showed EF of 45-50%.   He continued to have episodes of fast heart rate on his Fitbit. . A monitor that he wore in February of 2021 shows 94% sinus rhythm.   FEB 2021: He has tachycardia induced cardiomyopathy. His predominant rhythm was atrial flutter/tach and AF. He failed Tikosyn.  ECHO: TTE (October of 2021) LVEF 60-65%. Ascending aorta 4.1 cm. Mild LVH. No significant valve disease. LVIDd 4.25 cm.     Flecainide was started at Dr. Cochran's last visit 11/15/2022 what he was found to be back in Afib.  He then underwent successful HASEEB guided DCCV on 12/8/2022  At some point the Flecainide was switched to Amiodarone     Now s/p redo PVI, posterior and anterior wall scar modification RFA with Dr. Francisco 3/6/2023  ECG 4/18/2023 SB HR 52 bpm  Amiodarone was stopped post ablation  ECG 6/30/2023 SB Hr 56 bpm normal intervals. Amiodarone was resumed due to recurrent arrhythmia symptoms and abnormal ECGs on his smart watch      Holter monitor 6/30-7/13/23 SB-ST, max  bpm, minimum HR 37bpm, avg 60 bpm, <1% VE burden, 1% SVE burden, 211 SVT episodes, longest 481 beats, fastest 161 on 6/30. SVT episodes appear to be Atach  ECG 7/28/2023 NSR HR 54 bpm, QTc 479 ms  Patient had low BP and was dizzy at the visit in July.  Lab work revealed some mild anemia and his Entresto dose was cut in  "half.  Patient just got a refill of his medication and requested that he cut his current pills in half instead of getting a new prescription.    ECG 10/27/2023 SB with PAC HR 58 bpm, Qtc 486 ms, amiodarone was stopped  ECG 4/26/2024 Atrial Flutter  bpm  Patient started back on Amiodarone  ECG 5/24/2024 Atrial Flutter HR 97 bpm    TODAY patient presents for follow up after restarting amiodarone.  He feels slightly better and states his heart rate has improved, but he still feels like he is out of normal sinus rhythm.  He admits to MCNAMARA, worsening LE edema, and orthopnea.    /70 (BP Location: Left arm, Patient Position: Sitting, BP Cuff Size: Large adult)   Pulse 81   Ht 1.727 m (5' 8\")   Wt 83.9 kg (185 lb 1 oz)   SpO2 94%   BMI 28.14 kg/m²   Current Outpatient Medications on File Prior to Visit   Medication Sig Dispense Refill    atorvastatin (Lipitor) 10 mg tablet Take 1 tablet (10 mg) by mouth once daily. as directed 90 tablet 3    dapagliflozin propanediol (Farxiga) 10 mg Take 1 tablet (10 mg) by mouth once daily. 30 tablet 11    Entresto  mg tablet Take 1 tablet by mouth 2 times a day. 60 tablet 11    furosemide (Lasix) 20 mg tablet TAKE 1 TABLET BY MOUTH ONCE DAILY 270 tablet 0    metoprolol succinate XL (Toprol-XL) 100 mg 24 hr tablet TAKE 1 TABLET BY MOUTH ONCE DAILY 270 tablet 0    spironolactone (Aldactone) 25 mg tablet Take 1 tablet (25 mg) by mouth once daily. 30 tablet 11    timolol (Timoptic) 0.5 % ophthalmic solution USE 1 DROP IN THE RIGHT EYE EVERY MORNING. 55 mL 0    [DISCONTINUED] amiodarone (Pacerone) 200 mg tablet Take 1 tablet (200 mg) by mouth once daily. 90 tablet 3    [DISCONTINUED] apixaban (Eliquis) 5 mg tablet Take 1 tablet (5 mg) by mouth 2 times a day. 60 tablet 0    [DISCONTINUED] polyethylene glycol-electrolytes (GaviLyte-G) 420 gram solution Please refer to the printed instructions that were mailed to you. (Patient not taking: Reported on 5/24/2024) 4000 mL 0 "     No current facility-administered medications on file prior to visit.         Review of Systems   Constitutional: Positive for malaise/fatigue. Negative for diaphoresis and fever.   HENT:  Negative for congestion and sore throat.    Eyes:  Negative for blurred vision and double vision.   Cardiovascular:  Positive for dyspnea on exertion and palpitations. Negative for chest pain, leg swelling, near-syncope, orthopnea, paroxysmal nocturnal dyspnea and syncope.   Respiratory:  Negative for cough, hemoptysis, shortness of breath, snoring and sputum production.    Hematologic/Lymphatic: Negative for bleeding problem.   Skin:  Negative for rash.   Musculoskeletal:  Negative for falls, joint pain and myalgias.   Gastrointestinal:  Negative for bloating, abdominal pain, diarrhea, nausea and vomiting.   Neurological:  Negative for dizziness, headaches, light-headedness and weakness.   All other systems reviewed and are negative.      Objective   Constitutional:       Appearance: Healthy appearance. Not in distress.   Eyes:      Conjunctiva/sclera: Conjunctivae normal.   HENT:      Nose: Nose normal.    Mouth/Throat:      Pharynx: Oropharynx is clear.   Pulmonary:      Effort: Pulmonary effort is normal.      Breath sounds: Normal breath sounds. No wheezing. No rhonchi.   Chest:      Chest wall: Not tender to palpatation.   Cardiovascular:      Normal rate. Irregularly irregular rhythm.      Murmurs: There is no murmur.      No rub.   Pulses:     Intact distal pulses.   Edema:     Peripheral edema absent.   Abdominal:      General: Bowel sounds are normal.      Palpations: Abdomen is soft.   Musculoskeletal: Normal range of motion.      Cervical back: Neck supple. Skin:     General: Skin is warm and dry.   Neurological:      Mental Status: Alert and oriented to person, place and time.      Motor: Motor function is intact.         Lab Review:   Lab Results   Component Value Date     05/20/2024    K 4.7 05/20/2024      05/20/2024    CO2 22 05/20/2024    BUN 20 05/20/2024    CREATININE 1.20 05/20/2024    GLUCOSE 97 05/20/2024    CALCIUM 8.7 05/20/2024     Lab Results   Component Value Date    WBC 6.5 05/20/2024    HGB 13.3 (L) 05/20/2024    HCT 40.7 (L) 05/20/2024    MCV 88 05/20/2024     05/20/2024       Assessment/Plan   The primary encounter diagnosis was Atrial fibrillation, unspecified type (Multi). Diagnoses of Encounter for monitoring amiodarone therapy and Sleep apnea, unspecified type were also pertinent to this visit.  Patient is unfortunately still out of normal rhythm.  We would like to get him set up for a cardioversion, but it can be done after his scheduled dental work.  He has some tooth extractions scheduled 6/12 and will need to hold the Eliquis.  The teeth are bothering him and we do not want to delay his care.    Continue amiodarone, Eliquis and metoprolol  We will arrange cardioversion sometime at the end of June/Early July  Patient with recent normal LFTs, we will check PFT and TSH for amiodarone surveilance    UPDATE; TSH/T4  results came back with signs of hyperthyroidism.  I reviewed with Dr. Francisco and we will stop the Amiodarone and refer to Endocrinology.  Patient had normal TSH July of 2023.  It is unclear if the hyperthyroidism is amiodarone induced or if it caused the recurrence of Aflutter last month.  If endocrinology determines it is amiodarone induced, we will not be able to use it again. If the hyperthyroidism is not amiodarone induced, correcting the thyroid may fix the arrhythmia  We will increase his Metoprolol to 100 mg BID and cancel PFT

## 2024-05-30 ENCOUNTER — PHARMACY VISIT (OUTPATIENT)
Dept: PHARMACY | Facility: CLINIC | Age: 64
End: 2024-05-30
Payer: COMMERCIAL

## 2024-06-04 PROCEDURE — RXMED WILLOW AMBULATORY MEDICATION CHARGE

## 2024-06-05 DIAGNOSIS — D64.9 ANEMIA, UNSPECIFIED TYPE: Primary | ICD-10-CM

## 2024-06-05 DIAGNOSIS — E05.90 HYPERTHYROIDISM: ICD-10-CM

## 2024-06-06 ENCOUNTER — PHARMACY VISIT (OUTPATIENT)
Dept: PHARMACY | Facility: CLINIC | Age: 64
End: 2024-06-06
Payer: COMMERCIAL

## 2024-06-10 ENCOUNTER — LAB (OUTPATIENT)
Dept: LAB | Facility: LAB | Age: 64
End: 2024-06-10
Payer: COMMERCIAL

## 2024-06-10 DIAGNOSIS — D64.9 ANEMIA, UNSPECIFIED TYPE: ICD-10-CM

## 2024-06-10 DIAGNOSIS — E05.90 HYPERTHYROIDISM: ICD-10-CM

## 2024-06-10 PROCEDURE — 84436 ASSAY OF TOTAL THYROXINE: CPT

## 2024-06-10 PROCEDURE — 36415 COLL VENOUS BLD VENIPUNCTURE: CPT

## 2024-06-10 PROCEDURE — 82607 VITAMIN B-12: CPT

## 2024-06-10 PROCEDURE — 86376 MICROSOMAL ANTIBODY EACH: CPT

## 2024-06-10 PROCEDURE — 84443 ASSAY THYROID STIM HORMONE: CPT

## 2024-06-10 PROCEDURE — 85027 COMPLETE CBC AUTOMATED: CPT

## 2024-06-11 LAB
ERYTHROCYTE [DISTWIDTH] IN BLOOD BY AUTOMATED COUNT: 13.2 % (ref 11.5–14.5)
HCT VFR BLD AUTO: 41.4 % (ref 41–52)
HGB BLD-MCNC: 13.6 G/DL (ref 13.5–17.5)
MCH RBC QN AUTO: 29.6 PG (ref 26–34)
MCHC RBC AUTO-ENTMCNC: 32.9 G/DL (ref 32–36)
MCV RBC AUTO: 90 FL (ref 80–100)
NRBC BLD-RTO: 0 /100 WBCS (ref 0–0)
PLATELET # BLD AUTO: 194 X10*3/UL (ref 150–450)
RBC # BLD AUTO: 4.59 X10*6/UL (ref 4.5–5.9)
T4 SERPL-MCNC: 13.4 UG/DL (ref 4.5–11.1)
THYROPEROXIDASE AB SERPL-ACNC: 683 IU/ML
TSH SERPL-ACNC: 0.02 MIU/L (ref 0.44–3.98)
VIT B12 SERPL-MCNC: 168 PG/ML (ref 211–911)
WBC # BLD AUTO: 6.9 X10*3/UL (ref 4.4–11.3)

## 2024-06-11 PROCEDURE — RXMED WILLOW AMBULATORY MEDICATION CHARGE

## 2024-06-12 ENCOUNTER — PHARMACY VISIT (OUTPATIENT)
Dept: PHARMACY | Facility: CLINIC | Age: 64
End: 2024-06-12
Payer: COMMERCIAL

## 2024-06-14 LAB
ATRIAL RATE: 241 BPM
P AXIS: 83 DEGREES
Q ONSET: 222 MS
QRS COUNT: 16 BEATS
QRS DURATION: 82 MS
QT INTERVAL: 300 MS
QTC CALCULATION(BAZETT): 381 MS
QTC FREDERICIA: 352 MS
R AXIS: 14 DEGREES
T AXIS: -55 DEGREES
T OFFSET: 372 MS
VENTRICULAR RATE: 97 BPM

## 2024-06-25 ENCOUNTER — APPOINTMENT (OUTPATIENT)
Dept: ENDOCRINOLOGY | Facility: CLINIC | Age: 64
End: 2024-06-25
Payer: COMMERCIAL

## 2024-06-25 ENCOUNTER — LAB (OUTPATIENT)
Dept: LAB | Facility: LAB | Age: 64
End: 2024-06-25
Payer: COMMERCIAL

## 2024-06-25 VITALS
DIASTOLIC BLOOD PRESSURE: 64 MMHG | HEART RATE: 95 BPM | SYSTOLIC BLOOD PRESSURE: 88 MMHG | BODY MASS INDEX: 27.74 KG/M2 | HEIGHT: 68 IN | WEIGHT: 183 LBS

## 2024-06-25 DIAGNOSIS — E05.90 HYPERTHYROIDISM: Primary | ICD-10-CM

## 2024-06-25 DIAGNOSIS — E05.90 HYPERTHYROIDISM: ICD-10-CM

## 2024-06-25 LAB
ALBUMIN SERPL BCP-MCNC: 4.3 G/DL (ref 3.4–5)
ALP SERPL-CCNC: 57 U/L (ref 33–136)
ALT SERPL W P-5'-P-CCNC: 13 U/L (ref 10–52)
ANION GAP SERPL CALC-SCNC: 15 MMOL/L (ref 10–20)
AST SERPL W P-5'-P-CCNC: 15 U/L (ref 9–39)
BASOPHILS # BLD AUTO: 0.02 X10*3/UL (ref 0–0.1)
BASOPHILS NFR BLD AUTO: 0.3 %
BILIRUB SERPL-MCNC: 0.6 MG/DL (ref 0–1.2)
BUN SERPL-MCNC: 22 MG/DL (ref 6–23)
CALCIUM SERPL-MCNC: 9.4 MG/DL (ref 8.6–10.6)
CHLORIDE SERPL-SCNC: 103 MMOL/L (ref 98–107)
CO2 SERPL-SCNC: 25 MMOL/L (ref 21–32)
CREAT SERPL-MCNC: 1.09 MG/DL (ref 0.5–1.3)
EGFRCR SERPLBLD CKD-EPI 2021: 75 ML/MIN/1.73M*2
EOSINOPHIL # BLD AUTO: 0.24 X10*3/UL (ref 0–0.7)
EOSINOPHIL NFR BLD AUTO: 3.7 %
ERYTHROCYTE [DISTWIDTH] IN BLOOD BY AUTOMATED COUNT: 13 % (ref 11.5–14.5)
GLUCOSE SERPL-MCNC: 101 MG/DL (ref 74–99)
HCT VFR BLD AUTO: 44.2 % (ref 41–52)
HGB BLD-MCNC: 14.5 G/DL (ref 13.5–17.5)
IMM GRANULOCYTES # BLD AUTO: 0.01 X10*3/UL (ref 0–0.7)
IMM GRANULOCYTES NFR BLD AUTO: 0.2 % (ref 0–0.9)
LYMPHOCYTES # BLD AUTO: 1.58 X10*3/UL (ref 1.2–4.8)
LYMPHOCYTES NFR BLD AUTO: 24.6 %
MCH RBC QN AUTO: 29.1 PG (ref 26–34)
MCHC RBC AUTO-ENTMCNC: 32.8 G/DL (ref 32–36)
MCV RBC AUTO: 89 FL (ref 80–100)
MONOCYTES # BLD AUTO: 0.73 X10*3/UL (ref 0.1–1)
MONOCYTES NFR BLD AUTO: 11.4 %
NEUTROPHILS # BLD AUTO: 3.85 X10*3/UL (ref 1.2–7.7)
NEUTROPHILS NFR BLD AUTO: 59.8 %
NRBC BLD-RTO: 0 /100 WBCS (ref 0–0)
PLATELET # BLD AUTO: 211 X10*3/UL (ref 150–450)
POTASSIUM SERPL-SCNC: 4.7 MMOL/L (ref 3.5–5.3)
PROT SERPL-MCNC: 6.8 G/DL (ref 6.4–8.2)
PTH-INTACT SERPL-MCNC: 76.9 PG/ML (ref 18.5–88)
RBC # BLD AUTO: 4.99 X10*6/UL (ref 4.5–5.9)
SODIUM SERPL-SCNC: 138 MMOL/L (ref 136–145)
T3 SERPL-MCNC: 155 NG/DL (ref 60–200)
T4 FREE SERPL-MCNC: 2.04 NG/DL (ref 0.78–1.48)
THYROPEROXIDASE AB SERPL-ACNC: 692 IU/ML
TSH SERPL-ACNC: 0.01 MIU/L (ref 0.44–3.98)
WBC # BLD AUTO: 6.4 X10*3/UL (ref 4.4–11.3)

## 2024-06-25 PROCEDURE — 83970 ASSAY OF PARATHORMONE: CPT

## 2024-06-25 PROCEDURE — 84439 ASSAY OF FREE THYROXINE: CPT

## 2024-06-25 PROCEDURE — 99204 OFFICE O/P NEW MOD 45 MIN: CPT | Performed by: INTERNAL MEDICINE

## 2024-06-25 PROCEDURE — 1159F MED LIST DOCD IN RCRD: CPT | Performed by: INTERNAL MEDICINE

## 2024-06-25 PROCEDURE — 1126F AMNT PAIN NOTED NONE PRSNT: CPT | Performed by: INTERNAL MEDICINE

## 2024-06-25 PROCEDURE — 80053 COMPREHEN METABOLIC PANEL: CPT

## 2024-06-25 PROCEDURE — 84443 ASSAY THYROID STIM HORMONE: CPT

## 2024-06-25 PROCEDURE — 1036F TOBACCO NON-USER: CPT | Performed by: INTERNAL MEDICINE

## 2024-06-25 PROCEDURE — 84480 ASSAY TRIIODOTHYRONINE (T3): CPT

## 2024-06-25 PROCEDURE — 36415 COLL VENOUS BLD VENIPUNCTURE: CPT

## 2024-06-25 PROCEDURE — 86376 MICROSOMAL ANTIBODY EACH: CPT

## 2024-06-25 PROCEDURE — 84445 ASSAY OF TSI GLOBULIN: CPT

## 2024-06-25 PROCEDURE — 85025 COMPLETE CBC W/AUTO DIFF WBC: CPT

## 2024-06-25 ASSESSMENT — PAIN SCALES - GENERAL: PAINLEVEL: 0-NO PAIN

## 2024-06-25 NOTE — PROGRESS NOTES
Addendum     Will start Methimazole 5 mg BID for 3 weeks then daily; get labs in a month.      Chief complaint    Thyroid abn    HPI    63 year old man wit h5 year hx of Afib s/ ablation noted on routine testing to have abn TFT c/ w hyperthyroidism. Stable weight; chonic poor sleep; no GI sx; no sweats or palpitations except with AFIb; lower energy    No eye sx    Mother has thyroid disease.      EXAM     Healthy appearing   No tremors; normal skin texture  Brisk reflexes  Thyroid: 20 gms; rubbery ; no bruit  No adenopathy  Rest: negative   Works as an    Not active physically     Impression/ plan    No clinical sx to indicate hyperthyroidism. Will get labs today and call for meds even if he has mild hyperthyroidism in light of the AFIB. Return in 3 months.      James Hamm MD

## 2024-06-26 RX ORDER — METHIMAZOLE 5 MG/1
5 TABLET ORAL 2 TIMES DAILY
Qty: 60 TABLET | Refills: 6 | Status: SHIPPED | OUTPATIENT
Start: 2024-06-26 | End: 2025-06-26

## 2024-07-02 LAB — TSI SER-ACNC: <1 TSI INDEX

## 2024-07-08 PROCEDURE — RXMED WILLOW AMBULATORY MEDICATION CHARGE

## 2024-07-11 ENCOUNTER — APPOINTMENT (OUTPATIENT)
Dept: PRIMARY CARE | Facility: CLINIC | Age: 64
End: 2024-07-11
Payer: COMMERCIAL

## 2024-07-11 ENCOUNTER — PHARMACY VISIT (OUTPATIENT)
Dept: PHARMACY | Facility: CLINIC | Age: 64
End: 2024-07-11
Payer: COMMERCIAL

## 2024-07-11 VITALS
HEART RATE: 87 BPM | SYSTOLIC BLOOD PRESSURE: 88 MMHG | DIASTOLIC BLOOD PRESSURE: 61 MMHG | WEIGHT: 183 LBS | OXYGEN SATURATION: 95 % | BODY MASS INDEX: 27.74 KG/M2 | HEIGHT: 68 IN

## 2024-07-11 DIAGNOSIS — E05.90 HYPERTHYROIDISM: Primary | ICD-10-CM

## 2024-07-11 DIAGNOSIS — I95.9 HYPOTENSION, UNSPECIFIED HYPOTENSION TYPE: ICD-10-CM

## 2024-07-11 PROCEDURE — 99214 OFFICE O/P EST MOD 30 MIN: CPT | Performed by: FAMILY MEDICINE

## 2024-07-11 PROCEDURE — 1124F ACP DISCUSS-NO DSCNMKR DOCD: CPT | Performed by: FAMILY MEDICINE

## 2024-07-11 PROCEDURE — 1036F TOBACCO NON-USER: CPT | Performed by: FAMILY MEDICINE

## 2024-07-11 ASSESSMENT — ENCOUNTER SYMPTOMS
MUSCULOSKELETAL NEGATIVE: 1
CONSTITUTIONAL NEGATIVE: 1
RESPIRATORY NEGATIVE: 1
CARDIOVASCULAR NEGATIVE: 1

## 2024-07-11 ASSESSMENT — PATIENT HEALTH QUESTIONNAIRE - PHQ9
2. FEELING DOWN, DEPRESSED OR HOPELESS: NOT AT ALL
SUM OF ALL RESPONSES TO PHQ9 QUESTIONS 1 AND 2: 0
1. LITTLE INTEREST OR PLEASURE IN DOING THINGS: NOT AT ALL

## 2024-07-11 NOTE — PROGRESS NOTES
Subjective   Patient ID: Endy Nuñez is a 64 y.o. male who presents for No chief complaint on file..  HPI  Hypothyroid patient's blood pressure also been relatively low gets lightheaded when he stands up patient is on multiple medications I would have to stop his spironolactone and see if that improves things and the patient is to follow-up in a couple months patient is also to follow-up with cardiology.  Review of Systems   Constitutional: Negative.    HENT: Negative.     Respiratory: Negative.     Cardiovascular: Negative.    Genitourinary: Negative.    Musculoskeletal: Negative.        Objective   Physical Exam  Constitutional:       Appearance: Normal appearance.   HENT:      Mouth/Throat:      Mouth: Mucous membranes are moist.   Eyes:      Extraocular Movements: Extraocular movements intact.      Pupils: Pupils are equal, round, and reactive to light.   Cardiovascular:      Rate and Rhythm: Normal rate and regular rhythm.   Pulmonary:      Effort: Pulmonary effort is normal.      Breath sounds: Normal breath sounds.   Musculoskeletal:         General: Normal range of motion.   Skin:     General: Skin is warm and dry.   Neurological:      Mental Status: He is alert.       Assessment/Plan   Problem List Items Addressed This Visit    None           Finn Renteria DO 07/11/24 8:11 AM

## 2024-08-01 PROCEDURE — RXMED WILLOW AMBULATORY MEDICATION CHARGE

## 2024-08-03 ENCOUNTER — PHARMACY VISIT (OUTPATIENT)
Dept: PHARMACY | Facility: CLINIC | Age: 64
End: 2024-08-03
Payer: COMMERCIAL

## 2024-08-08 ENCOUNTER — PHARMACY VISIT (OUTPATIENT)
Dept: PHARMACY | Facility: CLINIC | Age: 64
End: 2024-08-08
Payer: COMMERCIAL

## 2024-08-08 PROCEDURE — RXMED WILLOW AMBULATORY MEDICATION CHARGE

## 2024-08-19 NOTE — PROGRESS NOTES
Subjective     History of Present Illness:   Endy Nuñez is a 64 y.o. male who presents to GI clinic for rectal bleeding  Complains of intermittent rectal bleeding for the past 3 or 4 years.  He said he will see some blood in the tissue and in the stools and this lasts for 2 days and then goes away for several months.  History of Afib on Eliquis .    Colonoscopy in 2023 was normal.  In addition to this he does have a history of acid reflux.  He said he has had GERD for several years and he is always taken over-the-counter medications for GERD.  His main symptoms include heartburn and regurgitation.    Review of Systems  Review of Systems   Constitutional: Negative.    HENT: Negative.     Eyes: Negative.    Respiratory: Negative.     Cardiovascular: Negative.    Gastrointestinal:         As in HPI    Endocrine: Negative.    Genitourinary: Negative.    Musculoskeletal: Negative.    Skin: Negative.    Neurological: Negative.    Psychiatric/Behavioral: Negative.         Past Medical History   has a past medical history of Atrial fibrillation (Multi), Cardiomyopathy (Multi), CHF (congestive heart failure) (Multi), Hyperlipidemia, Hypertension, Mild CAD (11/23/2020), and Sleep apnea.     Social History   reports that he quit smoking about 19 years ago. His smoking use included cigarettes. He has never used smokeless tobacco. He reports current alcohol use of about 2.0 standard drinks of alcohol per week. He reports that he does not use drugs.     Family History  family history includes Atrial fibrillation in his mother; Heart failure in his mother.     Allergies  No Known Allergies    Medications  Current Outpatient Medications   Medication Instructions    atorvastatin (LIPITOR) 10 mg, oral, Daily, as directed    Eliquis 5 mg, oral, 2 times daily    Entresto  mg tablet 1 tablet, oral, 2 times daily    Farxiga 10 mg, oral, Daily    furosemide (Lasix) 20 mg tablet TAKE 1 TABLET BY MOUTH ONCE DAILY    methIMAzole  (TAPAZOLE) 5 mg, oral, 2 times daily    metoprolol succinate XL (TOPROL-XL) 100 mg, oral, 2 times daily, Do not crush or chew.    spironolactone (ALDACTONE) 25 mg, oral, Daily    timolol (Timoptic) 0.5 % ophthalmic solution USE 1 DROP IN THE RIGHT EYE EVERY MORNING.       Objective     Physical Exam  Vitals reviewed.   Constitutional:       Appearance: Normal appearance.   HENT:      Head: Normocephalic.   Eyes:      Conjunctiva/sclera: Conjunctivae normal.      Pupils: Pupils are equal, round, and reactive to light.   Cardiovascular:      Rate and Rhythm: Normal rate and regular rhythm.   Pulmonary:      Effort: Pulmonary effort is normal.      Breath sounds: Normal breath sounds.   Abdominal:      General: Abdomen is flat. Bowel sounds are normal. There is no distension.      Palpations: Abdomen is soft.      Tenderness: There is no abdominal tenderness. There is no guarding or rebound.   Genitourinary:     Comments: Rectal exam was done and that showed a large external skin tag.  See anoscopy report.  Musculoskeletal:         General: Normal range of motion.   Skin:     General: Skin is warm and dry.   Neurological:      General: No focal deficit present.      Mental Status: He is alert and oriented to person, place, and time.       Anoscopy    Date/Time: 8/20/2024 8:51 AM    Performed by: Adriel Coelho MD  Authorized by: Adriel Coelho MD    Consent:     Consent obtained:  Verbal    Consent given by:  Patient    Risks, benefits, and alternatives were discussed: yes    Universal protocol:     Patient identity confirmed:  Verbally with patient  Indications:     Indications:  Rectal bleeding  Sedation:     Sedation type:  None  Anesthesia:     Anesthesia method:  None  Procedure specific details:      Anoscopy was done and that showed grade 2 internal hemorrhoids with bleeding.    Post-procedure details:     Procedure completion:  Tolerated              Assessment/Plan   Endy Nuñez is a 64 y.o. male who presents to  GI clinic for evaluation of rectal bleeding.    #1 rectal bleeding.  This is likely related to internal hemorrhoids.  I do not think there is a need to repeat a colonoscopy.  We did talk about some lifestyle modifications.  I will give him a hemorrhoidal cream.    2.  GERD.  History of GERD.  Will do an EGD to determine severity of GERD as well as screen for Rodriguez's esophagus.              Adriel Coelho MD

## 2024-08-20 ENCOUNTER — APPOINTMENT (OUTPATIENT)
Dept: GASTROENTEROLOGY | Facility: CLINIC | Age: 64
End: 2024-08-20
Payer: COMMERCIAL

## 2024-08-20 VITALS
SYSTOLIC BLOOD PRESSURE: 110 MMHG | BODY MASS INDEX: 28.49 KG/M2 | HEART RATE: 130 BPM | DIASTOLIC BLOOD PRESSURE: 78 MMHG | WEIGHT: 188 LBS | HEIGHT: 68 IN

## 2024-08-20 DIAGNOSIS — K64.8 INTERNAL HEMORRHOIDS: ICD-10-CM

## 2024-08-20 DIAGNOSIS — K21.9 GASTROESOPHAGEAL REFLUX DISEASE WITHOUT ESOPHAGITIS: Primary | ICD-10-CM

## 2024-08-20 PROCEDURE — 1159F MED LIST DOCD IN RCRD: CPT | Performed by: INTERNAL MEDICINE

## 2024-08-20 PROCEDURE — 99204 OFFICE O/P NEW MOD 45 MIN: CPT | Performed by: INTERNAL MEDICINE

## 2024-08-20 PROCEDURE — 3008F BODY MASS INDEX DOCD: CPT | Performed by: INTERNAL MEDICINE

## 2024-08-20 PROCEDURE — 46600 DIAGNOSTIC ANOSCOPY SPX: CPT | Performed by: INTERNAL MEDICINE

## 2024-08-20 RX ORDER — SODIUM CHLORIDE, SODIUM LACTATE, POTASSIUM CHLORIDE, CALCIUM CHLORIDE 600; 310; 30; 20 MG/100ML; MG/100ML; MG/100ML; MG/100ML
20 INJECTION, SOLUTION INTRAVENOUS CONTINUOUS
OUTPATIENT
Start: 2024-08-20

## 2024-08-20 ASSESSMENT — ENCOUNTER SYMPTOMS
CONSTITUTIONAL NEGATIVE: 1
PSYCHIATRIC NEGATIVE: 1
ENDOCRINE NEGATIVE: 1
ROS GI COMMENTS: AS IN HPI
RESPIRATORY NEGATIVE: 1
NEUROLOGICAL NEGATIVE: 1
MUSCULOSKELETAL NEGATIVE: 1
EYES NEGATIVE: 1
CARDIOVASCULAR NEGATIVE: 1

## 2024-08-27 PROCEDURE — RXMED WILLOW AMBULATORY MEDICATION CHARGE

## 2024-08-28 ENCOUNTER — PHARMACY VISIT (OUTPATIENT)
Dept: PHARMACY | Facility: CLINIC | Age: 64
End: 2024-08-28
Payer: COMMERCIAL

## 2024-09-23 ENCOUNTER — ANESTHESIA EVENT (OUTPATIENT)
Dept: GASTROENTEROLOGY | Facility: EXTERNAL LOCATION | Age: 64
End: 2024-09-23

## 2024-09-25 ENCOUNTER — LAB (OUTPATIENT)
Dept: LAB | Facility: LAB | Age: 64
End: 2024-09-25
Payer: COMMERCIAL

## 2024-09-25 ENCOUNTER — APPOINTMENT (OUTPATIENT)
Dept: ENDOCRINOLOGY | Facility: CLINIC | Age: 64
End: 2024-09-25
Payer: COMMERCIAL

## 2024-09-25 VITALS
HEART RATE: 129 BPM | SYSTOLIC BLOOD PRESSURE: 115 MMHG | BODY MASS INDEX: 28.49 KG/M2 | WEIGHT: 188 LBS | DIASTOLIC BLOOD PRESSURE: 88 MMHG | HEIGHT: 68 IN

## 2024-09-25 DIAGNOSIS — E05.90 HYPERTHYROIDISM: ICD-10-CM

## 2024-09-25 DIAGNOSIS — E05.90 HYPERTHYROIDISM: Primary | ICD-10-CM

## 2024-09-25 LAB
T3 SERPL-MCNC: 123 NG/DL (ref 60–200)
T4 FREE SERPL-MCNC: 1.29 NG/DL (ref 0.78–1.48)
TSH SERPL-ACNC: 4.3 MIU/L (ref 0.44–3.98)

## 2024-09-25 PROCEDURE — 1159F MED LIST DOCD IN RCRD: CPT | Performed by: INTERNAL MEDICINE

## 2024-09-25 PROCEDURE — 1036F TOBACCO NON-USER: CPT | Performed by: INTERNAL MEDICINE

## 2024-09-25 PROCEDURE — 36415 COLL VENOUS BLD VENIPUNCTURE: CPT

## 2024-09-25 PROCEDURE — 3008F BODY MASS INDEX DOCD: CPT | Performed by: INTERNAL MEDICINE

## 2024-09-25 PROCEDURE — 84480 ASSAY TRIIODOTHYRONINE (T3): CPT

## 2024-09-25 PROCEDURE — 99214 OFFICE O/P EST MOD 30 MIN: CPT | Performed by: INTERNAL MEDICINE

## 2024-09-25 PROCEDURE — 84439 ASSAY OF FREE THYROXINE: CPT

## 2024-09-25 PROCEDURE — 1126F AMNT PAIN NOTED NONE PRSNT: CPT | Performed by: INTERNAL MEDICINE

## 2024-09-25 PROCEDURE — 84443 ASSAY THYROID STIM HORMONE: CPT

## 2024-09-25 RX ORDER — METHIMAZOLE 5 MG/1
5 TABLET ORAL
Qty: 30 TABLET | Refills: 11 | Status: SHIPPED | OUTPATIENT
Start: 2024-09-25 | End: 2025-09-25

## 2024-09-25 ASSESSMENT — PAIN SCALES - GENERAL: PAINLEVEL: 0-NO PAIN

## 2024-09-25 NOTE — PATIENT INSTRUCTIONS
Thank you for seeing us at the office today. Please get your labs done like we discussed during our appointment and we will give you a call regarding your dose adjustment.

## 2024-09-25 NOTE — PROGRESS NOTES
Subjective:    Chief Complaint: Follow up    HPI:  The patient is a 64 year old male with a past medical history significant of but not limited to A fib s/p ablation, hyperthyroidism presents to the office today for a follow up visit.    Background Hx:  The patient was noted to have abnormal TFTs on routine testing, which were found to be consistent with hyperthyroidism. He was referred to endocrinology, and was seen in 06/2024. He was started on MMI 5 mg BID first for 3 weeks followed by 5 mg every day.    Interval Hx:  The patient reports he is doing well today. He is taking MMI 5 mg BID and reports no concerns. He denies any heat/cold intolerance, weight loss/gain, constipation/diarrhea, vision changes, hair fall, skin changes, fatigue etc. He has sleep apnea which prevents him from sleeping very well at night, and he does not use his CPAP at night because he is trying to figure out alternate ways as he does not like using the machine. He does have occasional palpitations but states the episodes have been spacing out compared to before.    Offnote, he was supposed to have labs done in 07/2024 however he forgot to get these.    ROS:   Negative unless noted above    Patient Active Problem List   Diagnosis    Trigger thumb    Snores    Pulmonary nodules    Palpitations    Obstructive sleep apnea, adult    Ganglion    Elevated coronary artery calcium score    MCNAMARA (dyspnea on exertion)    CHF (congestive heart failure) (Multi)    Cardiomyopathy (Multi)    Atypical chest pain    Atrial fibrillation (Multi)    Aneurysm of ascending aorta (CMS-HCC)     No Known Allergies  Family History   Problem Relation Name Age of Onset    Atrial fibrillation Mother      Heart failure Mother       Past Surgical History:   Procedure Laterality Date    ANKLE SURGERY      2011    CARDIAC ELECTROPHYSIOLOGY MAPPING AND ABLATION  03/06/2023    Afib    HAND TENDON SURGERY  10/19/2015    Hand Incision Tendon Sheath Of A Finger    OTHER  "SURGICAL HISTORY  02/11/2021 and 2/2023    Catheter ablation    ROTATOR CUFF REPAIR       Social Connections: Not on file     Objective:    Vitals:  Vitals:    09/25/24 0830   BP: 115/88   Pulse: (!) 129   Weight: 85.3 kg (188 lb)   Height: 1.727 m (5' 8\")     Physical Exam:    General: elderly patient in NAD  HEENT: AT/NC  Neck: trachea in midline, rubbery thyroid, no nodules palpated  Resp: CTA B/L  CVS: irregular rhythm  Abdomen: soft and non tender to palpation, BS+  Skin: warm, dry and intact  Neuro: AAO x3, DTR 2+, no tremors noted  Psych: cooperative    Medications:    Current Outpatient Medications on File Prior to Visit   Medication Sig Dispense Refill    apixaban (Eliquis) 5 mg tablet Take 1 tablet (5 mg) by mouth 2 times a day. 180 tablet 3    atorvastatin (Lipitor) 10 mg tablet Take 1 tablet (10 mg) by mouth once daily. as directed 90 tablet 3    dapagliflozin propanediol (Farxiga) 10 mg Take 1 tablet (10 mg) by mouth once daily. 30 tablet 11    Entresto  mg tablet Take 1 tablet by mouth 2 times a day. 60 tablet 11    furosemide (Lasix) 20 mg tablet TAKE 1 TABLET BY MOUTH ONCE DAILY 270 tablet 0    methIMAzole (Tapazole) 5 mg tablet Take 1 tablet (5 mg) by mouth 2 times a day. 60 tablet 6    metoprolol succinate XL (Toprol-XL) 100 mg 24 hr tablet Take 1 tablet (100 mg) by mouth 2 times a day. Do not crush or chew. 180 tablet 3    timolol (Timoptic) 0.5 % ophthalmic solution USE 1 DROP IN THE RIGHT EYE EVERY MORNING. 55 mL 0     No current facility-administered medications on file prior to visit.     Lab Results   Component Value Date    TSH 0.01 (L) 06/25/2024    V2AOAQE 155 06/25/2024    I7IHMBV 13.4 (H) 06/10/2024    THYROIDPAB 692 (H) 06/25/2024      Latest Reference Range & Units 11/23/20 10:16 07/31/23 15:09 05/20/24 08:10 05/24/24 15:16 06/10/24 14:07 06/25/24 13:45   Thyroxine, Free 0.78 - 1.48 ng/dL   2.47 (H) 2.65 (H)  2.04 (H)   Triiodothyronine 60 - 200 ng/dL      155   Thyroid " Stimulating Hormone 0.44 - 3.98 mIU/L 1.50 2.09 0.10 (L) 0.03 (L) 0.02 (L) 0.01 (L)   (H): Data is abnormally high  (L): Data is abnormally low    Assessment and Plan:    The patient is a 64 year old male who is at the office today to follow up for hyperthyroidism. The patient did not follow the instructions for the MMI, which he was supposed to take 5 mg every day after 3 weeks of starting as MMI 5 mg BID. He did not get labs done either. On exam, he does not appear to be hypothyroid. He still has underlying A fib.    - Will obtain TSH, FT4 and T3 today  - Will call him once the labs are resulted and decide on an appropriate dose of medication for him.  - Will schedule an appointment for follow up after labs.  - The patient has been told the same and he expressed understanding.    The patient was seen and discussed with Dr. Hamm.    Patti Reeves MD  PGY -4 Endocrinology Fellow

## 2024-09-25 NOTE — PROGRESS NOTES
Reviewed the lab results and discussed with Dr. aHmm. Spoke to the patient over the phone. We recommend decreasing MMI 5 mg BID to 5 mg every day. He was okay with it.  Will repeat labs in 6 weeks. Orders placed.

## 2024-09-30 DIAGNOSIS — E05.90 HYPERTHYROIDISM: ICD-10-CM

## 2024-09-30 PROCEDURE — RXMED WILLOW AMBULATORY MEDICATION CHARGE

## 2024-10-01 RX ORDER — METHIMAZOLE 5 MG/1
5 TABLET ORAL DAILY
Qty: 30 TABLET | Refills: 11 | Status: SHIPPED | OUTPATIENT
Start: 2024-10-01 | End: 2025-10-01

## 2024-10-02 ENCOUNTER — APPOINTMENT (OUTPATIENT)
Dept: GASTROENTEROLOGY | Facility: EXTERNAL LOCATION | Age: 64
End: 2024-10-02
Payer: COMMERCIAL

## 2024-10-02 ENCOUNTER — ANESTHESIA (OUTPATIENT)
Dept: GASTROENTEROLOGY | Facility: EXTERNAL LOCATION | Age: 64
End: 2024-10-02

## 2024-10-02 ENCOUNTER — PHARMACY VISIT (OUTPATIENT)
Dept: PHARMACY | Facility: CLINIC | Age: 64
End: 2024-10-02
Payer: COMMERCIAL

## 2024-10-02 VITALS
RESPIRATION RATE: 16 BRPM | OXYGEN SATURATION: 99 % | TEMPERATURE: 98.2 F | SYSTOLIC BLOOD PRESSURE: 95 MMHG | DIASTOLIC BLOOD PRESSURE: 72 MMHG | HEART RATE: 87 BPM

## 2024-10-02 DIAGNOSIS — K21.9 GASTROESOPHAGEAL REFLUX DISEASE WITHOUT ESOPHAGITIS: Primary | ICD-10-CM

## 2024-10-02 PROCEDURE — 43239 EGD BIOPSY SINGLE/MULTIPLE: CPT | Performed by: INTERNAL MEDICINE

## 2024-10-02 PROCEDURE — 87900 PHENOTYPE INFECT AGENT DRUG: CPT | Performed by: INTERNAL MEDICINE

## 2024-10-02 RX ORDER — SODIUM CHLORIDE 9 MG/ML
INJECTION, SOLUTION INTRAVENOUS CONTINUOUS PRN
Status: DISCONTINUED | OUTPATIENT
Start: 2024-10-02 | End: 2024-10-02

## 2024-10-02 RX ORDER — SODIUM CHLORIDE, SODIUM LACTATE, POTASSIUM CHLORIDE, CALCIUM CHLORIDE 600; 310; 30; 20 MG/100ML; MG/100ML; MG/100ML; MG/100ML
20 INJECTION, SOLUTION INTRAVENOUS CONTINUOUS
Status: DISCONTINUED | OUTPATIENT
Start: 2024-10-02 | End: 2024-10-03 | Stop reason: HOSPADM

## 2024-10-02 RX ORDER — LIDOCAINE HYDROCHLORIDE 20 MG/ML
INJECTION, SOLUTION INFILTRATION; PERINEURAL AS NEEDED
Status: DISCONTINUED | OUTPATIENT
Start: 2024-10-02 | End: 2024-10-02

## 2024-10-02 RX ORDER — PROPOFOL 10 MG/ML
INJECTION, EMULSION INTRAVENOUS AS NEEDED
Status: DISCONTINUED | OUTPATIENT
Start: 2024-10-02 | End: 2024-10-02

## 2024-10-02 SDOH — HEALTH STABILITY: MENTAL HEALTH: CURRENT SMOKER: 0

## 2024-10-02 ASSESSMENT — PAIN - FUNCTIONAL ASSESSMENT
PAIN_FUNCTIONAL_ASSESSMENT: 0-10

## 2024-10-02 ASSESSMENT — PAIN SCALES - GENERAL
PAINLEVEL_OUTOF10: 0 - NO PAIN
PAIN_LEVEL: 0
PAINLEVEL_OUTOF10: 0 - NO PAIN

## 2024-10-02 ASSESSMENT — COLUMBIA-SUICIDE SEVERITY RATING SCALE - C-SSRS
1. IN THE PAST MONTH, HAVE YOU WISHED YOU WERE DEAD OR WISHED YOU COULD GO TO SLEEP AND NOT WAKE UP?: NO
6. HAVE YOU EVER DONE ANYTHING, STARTED TO DO ANYTHING, OR PREPARED TO DO ANYTHING TO END YOUR LIFE?: NO
2. HAVE YOU ACTUALLY HAD ANY THOUGHTS OF KILLING YOURSELF?: NO

## 2024-10-02 NOTE — DISCHARGE INSTRUCTIONS

## 2024-10-02 NOTE — ANESTHESIA PREPROCEDURE EVALUATION
Patient: Endy Peric    Procedure Information       Date/Time: 10/02/24 0800    Scheduled providers: Adriel Coelho MD    Procedure: EGD    Location: [facility]            Relevant Problems   Cardiac   (+) Aneurysm of ascending aorta (CMS-HCC)   (+) Atrial fibrillation (Multi)   (+) Atypical chest pain   (+) CHF (congestive heart failure)      Pulmonary   (+) MCNAMARA (dyspnea on exertion)   (+) Obstructive sleep apnea, adult   (+) Pulmonary nodules       Clinical information reviewed:    Allergies  Meds               NPO Detail:  NPO/Void Status  Carbohydrate Drink Given Prior to Surgery? : N  Date of Last Liquid: 10/02/24  Time of Last Liquid: 0600  Date of Last Solid: 10/01/24  Time of Last Solid: 2000  Last Intake Type: Clear fluids  Time of Last Void: 0752         Physical Exam    Airway  Mallampati: II  TM distance: >3 FB  Neck ROM: full     Cardiovascular - normal exam     Dental - normal exam     Pulmonary - normal exam  Breath sounds clear to auscultation     Abdominal        Anesthesia Plan    History of general anesthesia?: yes  History of complications of general anesthesia?: no    ASA 3     MAC     The patient is not a current smoker.    intravenous induction   Anesthetic plan and risks discussed with patient.    Plan discussed with CRNA.

## 2024-10-02 NOTE — ANESTHESIA POSTPROCEDURE EVALUATION
Patient: Endy Nuñez    Procedure Summary       Date: 10/02/24 Room / Location: [facility]    Anesthesia Start: 0808 Anesthesia Stop:     Procedure: EGD Diagnosis:       Gastroesophageal reflux disease without esophagitis      Gastroesophageal reflux disease without esophagitis    Scheduled Providers: Adriel Coelho MD Responsible Provider: FREDDY Cho    Anesthesia Type: MAC ASA Status: 3            Anesthesia Type: MAC    Vitals Value Taken Time   BP 97/72 10/02/24 0823   Temp 36.8 °C (98.2 °F) 10/02/24 0823   Pulse 118 10/02/24 0823   Resp 18 10/02/24 0823   SpO2 98 % 10/02/24 0823       Anesthesia Post Evaluation    Patient location during evaluation: bedside  Patient participation: complete - patient cannot participate  Level of consciousness: responsive to verbal stimuli and sedated  Pain score: 0  Pain management: adequate  Airway patency: patent  Cardiovascular status: acceptable and hemodynamically stable  Respiratory status: acceptable  Hydration status: acceptable  Postoperative Nausea and Vomiting: none    No notable events documented.

## 2024-10-02 NOTE — H&P
Procedure H&P    Patient Profile-Procedures  Name Endy Nuñez  Date of Birth 1960  MRN [id number]  Address   [address]  [facility] [id number] [address] [facility] [address]    Primary Phone Number [phone]  Secondary Phone Number    Finn Gold    Procedure(s):  Procedures: EGD  Primary contact name and number   No emergency contact information on file.    General Health  Weight There were no vitals filed for this visit.  BMI There is no height or weight on file to calculate BMI.    Allergies  No Known Allergies    Past Medical History   Past Medical History:   Diagnosis Date    Atrial fibrillation (Multi)     Cardiomyopathy     CHF (congestive heart failure)     Hyperlipidemia     Hypertension     Mild CAD 11/23/2020    10-30% mid RCA    Sleep apnea        Provider assessment  Diagnosis: GERD  Medication Reviewed - yes  Prior to Admission medications   Eliquis 3 days   Medication Sig Start Date End Date Taking? Authorizing Provider   apixaban (Eliquis) 5 mg tablet Take 1 tablet (5 mg) by mouth 2 times a day. 5/24/24 5/24/25 Yes RANDAL Patel   atorvastatin (Lipitor) 10 mg tablet Take 1 tablet (10 mg) by mouth once daily. as directed 12/13/23 12/12/24 Yes RANDAL Patel   dapagliflozin propanediol (Farxiga) 10 mg Take 1 tablet (10 mg) by mouth once daily. 2/27/24 2/26/25 Yes Eliana Be MD   Entresto  mg tablet Take 1 tablet by mouth 2 times a day. 4/2/24  Yes Eliana Be MD   furosemide (Lasix) 20 mg tablet TAKE 1 TABLET BY MOUTH ONCE DAILY 5/8/24 5/7/25 Yes Eliana Be MD   methIMAzole (Tapazole) 5 mg tablet Take 1 tablet (5 mg) by mouth once daily. 10/1/24 10/1/25 Yes James Hamm MD   metoprolol succinate XL (Toprol-XL) 100 mg 24 hr tablet Take 1 tablet (100 mg) by mouth 2 times a day. Do not crush or chew. 5/29/24 5/29/25 Yes RANDAL Patel   timolol (Timoptic) 0.5 % ophthalmic solution USE 1 DROP  IN THE RIGHT EYE EVERY MORNING. 7/12/23 8/20/24  Eliana Be MD   methIMAzole (Tapazole) 5 mg tablet Take 1 tablet (5 mg) by mouth once daily (M-F). 9/25/24 9/30/24  Patti Reeves MD       Physical Exam  Vitals:    10/02/24 0751   BP: 111/89   Pulse: (!) 127   Resp: 18   Temp: 36 °C (96.8 °F)        General: A&Ox3, NAD.  HEENT: AT/NC.   CV: RRR. No murmur.  Resp: CTA bilaterally. No wheezing, rhonchi or rales.   GI: Soft, NT/ND. BSx4.  Extrem: No edema. Pulses intact.  Neuro: No focal deficits.   Psych: Normal mood and affect.      Procedure Plan - pre-procedural (re)assesment completed by physician:  discharge/transfer patient when discharge criteria met    ASA status 3  Mallampati score 2    Adriel Coelho MD  10/2/2024 8:10 AM

## 2024-10-05 PROCEDURE — RXMED WILLOW AMBULATORY MEDICATION CHARGE

## 2024-10-07 ENCOUNTER — PHARMACY VISIT (OUTPATIENT)
Dept: PHARMACY | Facility: CLINIC | Age: 64
End: 2024-10-07
Payer: COMMERCIAL

## 2024-10-10 LAB
LAB AP ASR DISCLAIMER: NORMAL
LABORATORY COMMENT REPORT: NORMAL
PATH REPORT.FINAL DX SPEC: NORMAL
PATH REPORT.GROSS SPEC: NORMAL
PATH REPORT.TOTAL CANCER: NORMAL

## 2024-10-11 ENCOUNTER — APPOINTMENT (OUTPATIENT)
Dept: ENDOCRINOLOGY | Facility: CLINIC | Age: 64
End: 2024-10-11
Payer: COMMERCIAL

## 2024-10-23 LAB
ELECTRONICALLY SIGNED BY: NORMAL
H. PYLORI DRUG SUSCEPTIBILITY RESULTS: NORMAL

## 2024-10-25 DIAGNOSIS — A04.8 HELICOBACTER PYLORI (H. PYLORI) INFECTION: ICD-10-CM

## 2024-10-25 DIAGNOSIS — K21.9 GASTROESOPHAGEAL REFLUX DISEASE WITHOUT ESOPHAGITIS: Primary | ICD-10-CM

## 2024-10-25 PROCEDURE — RXMED WILLOW AMBULATORY MEDICATION CHARGE

## 2024-10-25 RX ORDER — TETRACYCLINE HYDROCHLORIDE 500 MG/1
500 CAPSULE ORAL 4 TIMES DAILY
Qty: 56 CAPSULE | Refills: 0 | Status: SHIPPED | OUTPATIENT
Start: 2024-10-25 | End: 2024-11-08

## 2024-10-25 RX ORDER — OMEPRAZOLE 40 MG/1
40 CAPSULE, DELAYED RELEASE ORAL DAILY
Qty: 90 CAPSULE | Refills: 0 | Status: SHIPPED | OUTPATIENT
Start: 2024-10-25 | End: 2025-01-23

## 2024-10-25 RX ORDER — OMEPRAZOLE 40 MG/1
40 CAPSULE, DELAYED RELEASE ORAL
Qty: 28 CAPSULE | Refills: 0 | Status: SHIPPED | OUTPATIENT
Start: 2024-10-25 | End: 2024-11-08

## 2024-10-25 RX ORDER — METRONIDAZOLE 500 MG/1
500 TABLET ORAL 4 TIMES DAILY
Qty: 56 TABLET | Refills: 0 | Status: SHIPPED | OUTPATIENT
Start: 2024-10-25 | End: 2024-11-08

## 2024-10-25 RX ORDER — BISMUTH SUBSALICYLATE 262 MG/1
524 TABLET ORAL
Qty: 120 TABLET | Refills: 0 | Status: SHIPPED | OUTPATIENT
Start: 2024-10-25 | End: 2024-11-09

## 2024-10-28 ENCOUNTER — PHARMACY VISIT (OUTPATIENT)
Dept: PHARMACY | Facility: CLINIC | Age: 64
End: 2024-10-28
Payer: COMMERCIAL

## 2024-10-28 PROCEDURE — RXMED WILLOW AMBULATORY MEDICATION CHARGE

## 2024-11-25 ENCOUNTER — PHARMACY VISIT (OUTPATIENT)
Dept: PHARMACY | Facility: CLINIC | Age: 64
End: 2024-11-25
Payer: COMMERCIAL

## 2024-11-25 PROCEDURE — RXMED WILLOW AMBULATORY MEDICATION CHARGE

## 2024-12-11 DIAGNOSIS — E78.5 HYPERLIPIDEMIA, UNSPECIFIED HYPERLIPIDEMIA TYPE: ICD-10-CM

## 2024-12-11 PROCEDURE — RXMED WILLOW AMBULATORY MEDICATION CHARGE

## 2024-12-11 RX ORDER — ATORVASTATIN CALCIUM 10 MG/1
10 TABLET, FILM COATED ORAL DAILY
Qty: 90 TABLET | Refills: 3 | Status: SHIPPED | OUTPATIENT
Start: 2024-12-11 | End: 2025-12-11

## 2024-12-11 RX ORDER — ATORVASTATIN CALCIUM 10 MG/1
10 TABLET, FILM COATED ORAL DAILY
Qty: 90 TABLET | Refills: 3 | Status: CANCELLED | OUTPATIENT
Start: 2024-12-11 | End: 2025-12-11

## 2024-12-21 ENCOUNTER — PHARMACY VISIT (OUTPATIENT)
Dept: PHARMACY | Facility: CLINIC | Age: 64
End: 2024-12-21
Payer: COMMERCIAL

## 2024-12-21 PROCEDURE — RXMED WILLOW AMBULATORY MEDICATION CHARGE

## 2025-01-03 PROCEDURE — RXMED WILLOW AMBULATORY MEDICATION CHARGE

## 2025-01-06 ENCOUNTER — PHARMACY VISIT (OUTPATIENT)
Dept: PHARMACY | Facility: CLINIC | Age: 65
End: 2025-01-06
Payer: COMMERCIAL

## 2025-02-27 DIAGNOSIS — I50.9 CHRONIC CONGESTIVE HEART FAILURE, UNSPECIFIED HEART FAILURE TYPE: ICD-10-CM

## 2025-02-27 PROCEDURE — RXMED WILLOW AMBULATORY MEDICATION CHARGE

## 2025-02-27 RX ORDER — DAPAGLIFLOZIN 10 MG/1
10 TABLET, FILM COATED ORAL DAILY
Qty: 30 TABLET | Refills: 11 | Status: CANCELLED | OUTPATIENT
Start: 2025-02-27 | End: 2026-02-27

## 2025-02-27 RX ORDER — DAPAGLIFLOZIN 10 MG/1
10 TABLET, FILM COATED ORAL DAILY
Qty: 30 TABLET | Refills: 11 | Status: SHIPPED | OUTPATIENT
Start: 2025-02-27 | End: 2026-02-27

## 2025-03-03 ENCOUNTER — PHARMACY VISIT (OUTPATIENT)
Dept: PHARMACY | Facility: CLINIC | Age: 65
End: 2025-03-03
Payer: COMMERCIAL

## 2025-03-19 PROCEDURE — RXMED WILLOW AMBULATORY MEDICATION CHARGE

## 2025-03-20 ENCOUNTER — PHARMACY VISIT (OUTPATIENT)
Dept: PHARMACY | Facility: CLINIC | Age: 65
End: 2025-03-20
Payer: COMMERCIAL

## 2025-03-20 PROCEDURE — RXMED WILLOW AMBULATORY MEDICATION CHARGE

## 2025-03-22 PROCEDURE — RXMED WILLOW AMBULATORY MEDICATION CHARGE

## 2025-03-26 ENCOUNTER — PHARMACY VISIT (OUTPATIENT)
Dept: PHARMACY | Facility: CLINIC | Age: 65
End: 2025-03-26
Payer: COMMERCIAL

## 2025-03-26 PROCEDURE — RXMED WILLOW AMBULATORY MEDICATION CHARGE

## 2025-03-31 ENCOUNTER — PHARMACY VISIT (OUTPATIENT)
Dept: PHARMACY | Facility: CLINIC | Age: 65
End: 2025-03-31
Payer: COMMERCIAL

## 2025-03-31 DIAGNOSIS — I50.9 CONGESTIVE HEART FAILURE, UNSPECIFIED HF CHRONICITY, UNSPECIFIED HEART FAILURE TYPE: ICD-10-CM

## 2025-03-31 PROCEDURE — RXMED WILLOW AMBULATORY MEDICATION CHARGE

## 2025-03-31 RX ORDER — SACUBITRIL AND VALSARTAN 97; 103 MG/1; MG/1
1 TABLET, FILM COATED ORAL 2 TIMES DAILY
Qty: 60 TABLET | Refills: 11 | Status: SHIPPED | OUTPATIENT
Start: 2025-03-31

## 2025-04-17 PROCEDURE — RXMED WILLOW AMBULATORY MEDICATION CHARGE

## 2025-04-21 ENCOUNTER — PHARMACY VISIT (OUTPATIENT)
Dept: PHARMACY | Facility: CLINIC | Age: 65
End: 2025-04-21
Payer: COMMERCIAL

## 2025-04-29 PROCEDURE — RXMED WILLOW AMBULATORY MEDICATION CHARGE

## 2025-05-01 ENCOUNTER — PHARMACY VISIT (OUTPATIENT)
Dept: PHARMACY | Facility: CLINIC | Age: 65
End: 2025-05-01
Payer: COMMERCIAL

## 2025-05-01 PROCEDURE — RXMED WILLOW AMBULATORY MEDICATION CHARGE

## 2025-05-06 DIAGNOSIS — I50.9 CHRONIC CONGESTIVE HEART FAILURE, UNSPECIFIED HEART FAILURE TYPE: ICD-10-CM

## 2025-05-06 DIAGNOSIS — I48.91 ATRIAL FIBRILLATION, UNSPECIFIED TYPE (MULTI): Primary | ICD-10-CM

## 2025-05-07 ENCOUNTER — TELEPHONE (OUTPATIENT)
Dept: CARDIOLOGY | Facility: HOSPITAL | Age: 65
End: 2025-05-07

## 2025-05-13 ENCOUNTER — HOSPITAL ENCOUNTER (OUTPATIENT)
Dept: CARDIOLOGY | Facility: HOSPITAL | Age: 65
Discharge: HOME | End: 2025-05-13
Payer: COMMERCIAL

## 2025-05-13 ENCOUNTER — APPOINTMENT (OUTPATIENT)
Dept: CARDIOLOGY | Facility: HOSPITAL | Age: 65
End: 2025-05-13
Payer: COMMERCIAL

## 2025-05-13 ENCOUNTER — OFFICE VISIT (OUTPATIENT)
Dept: CARDIOLOGY | Facility: HOSPITAL | Age: 65
End: 2025-05-13
Payer: COMMERCIAL

## 2025-05-13 VITALS
OXYGEN SATURATION: 99 % | HEIGHT: 68 IN | HEART RATE: 92 BPM | WEIGHT: 178.6 LBS | DIASTOLIC BLOOD PRESSURE: 67 MMHG | SYSTOLIC BLOOD PRESSURE: 95 MMHG | BODY MASS INDEX: 27.07 KG/M2

## 2025-05-13 DIAGNOSIS — I50.9 CHRONIC CONGESTIVE HEART FAILURE, UNSPECIFIED HEART FAILURE TYPE: ICD-10-CM

## 2025-05-13 DIAGNOSIS — R00.2 PALPITATIONS: ICD-10-CM

## 2025-05-13 DIAGNOSIS — I48.91 ATRIAL FIBRILLATION, UNSPECIFIED TYPE (MULTI): ICD-10-CM

## 2025-05-13 DIAGNOSIS — R00.2 PALPITATIONS: Primary | ICD-10-CM

## 2025-05-13 DIAGNOSIS — I48.0 PAROXYSMAL ATRIAL FIBRILLATION (MULTI): ICD-10-CM

## 2025-05-13 LAB
ALBUMIN SERPL-MCNC: 4.4 G/DL (ref 3.6–5.1)
ALP SERPL-CCNC: 65 U/L (ref 35–144)
ALT SERPL-CCNC: 12 U/L (ref 9–46)
ANION GAP SERPL CALCULATED.4IONS-SCNC: 11 MMOL/L (CALC) (ref 7–17)
AORTIC VALVE PEAK VELOCITY: 0.88 M/S
AST SERPL-CCNC: 23 U/L (ref 10–35)
AV PEAK GRADIENT: 3 MMHG
AVA (PEAK VEL): 2.82 CM2
BILIRUB SERPL-MCNC: 1.4 MG/DL (ref 0.2–1.2)
BNP SERPL-MCNC: 164 PG/ML
BODY SURFACE AREA: 1.97 M2
BODY SURFACE AREA: 1.97 M2
BUN SERPL-MCNC: 31 MG/DL (ref 7–25)
CALCIUM SERPL-MCNC: 9.2 MG/DL (ref 8.6–10.3)
CHLORIDE SERPL-SCNC: 103 MMOL/L (ref 98–110)
CO2 SERPL-SCNC: 20 MMOL/L (ref 20–32)
CREAT SERPL-MCNC: 1.24 MG/DL (ref 0.7–1.35)
EGFRCR SERPLBLD CKD-EPI 2021: 64 ML/MIN/1.73M2
EJECTION FRACTION APICAL 4 CHAMBER: 56.9
EJECTION FRACTION: 51 %
ERYTHROCYTE [DISTWIDTH] IN BLOOD BY AUTOMATED COUNT: 13.5 % (ref 11–15)
GLUCOSE SERPL-MCNC: 82 MG/DL (ref 65–99)
HCT VFR BLD AUTO: 45.7 % (ref 38.5–50)
HGB BLD-MCNC: 14.9 G/DL (ref 13.2–17.1)
LEFT ATRIUM VOLUME AREA LENGTH INDEX BSA: 32.7 ML/M2
LEFT VENTRICLE INTERNAL DIMENSION DIASTOLE: 4.3 CM (ref 3.5–6)
LEFT VENTRICULAR OUTFLOW TRACT DIAMETER: 2.2 CM
MCH RBC QN AUTO: 29.4 PG (ref 27–33)
MCHC RBC AUTO-ENTMCNC: 32.6 G/DL (ref 32–36)
MCV RBC AUTO: 90.1 FL (ref 80–100)
PLATELET # BLD AUTO: 195 THOUSAND/UL (ref 140–400)
PMV BLD REES-ECKER: 11.2 FL (ref 7.5–12.5)
POTASSIUM SERPL-SCNC: 5.1 MMOL/L (ref 3.5–5.3)
PROT SERPL-MCNC: 6.8 G/DL (ref 6.1–8.1)
RBC # BLD AUTO: 5.07 MILLION/UL (ref 4.2–5.8)
SODIUM SERPL-SCNC: 134 MMOL/L (ref 135–146)
T3 SERPL-MCNC: 98 NG/DL (ref 76–181)
T4 FREE SERPL-MCNC: 1.4 NG/DL (ref 0.8–1.8)
TSH SERPL-ACNC: 4.43 MIU/L (ref 0.4–4.5)
WBC # BLD AUTO: 7.8 THOUSAND/UL (ref 3.8–10.8)

## 2025-05-13 PROCEDURE — 3008F BODY MASS INDEX DOCD: CPT | Performed by: INTERNAL MEDICINE

## 2025-05-13 PROCEDURE — 93306 TTE W/DOPPLER COMPLETE: CPT | Performed by: INTERNAL MEDICINE

## 2025-05-13 PROCEDURE — RXMED WILLOW AMBULATORY MEDICATION CHARGE

## 2025-05-13 PROCEDURE — 1159F MED LIST DOCD IN RCRD: CPT | Performed by: INTERNAL MEDICINE

## 2025-05-13 PROCEDURE — 93005 ELECTROCARDIOGRAM TRACING: CPT | Mod: 59 | Performed by: INTERNAL MEDICINE

## 2025-05-13 PROCEDURE — 93306 TTE W/DOPPLER COMPLETE: CPT

## 2025-05-13 PROCEDURE — 99213 OFFICE O/P EST LOW 20 MIN: CPT | Mod: 25

## 2025-05-13 PROCEDURE — 99214 OFFICE O/P EST MOD 30 MIN: CPT | Performed by: INTERNAL MEDICINE

## 2025-05-13 PROCEDURE — 1126F AMNT PAIN NOTED NONE PRSNT: CPT | Performed by: INTERNAL MEDICINE

## 2025-05-13 PROCEDURE — 93010 ELECTROCARDIOGRAM REPORT: CPT | Performed by: INTERNAL MEDICINE

## 2025-05-13 PROCEDURE — 93242 EXT ECG>48HR<7D RECORDING: CPT

## 2025-05-13 RX ORDER — DIGOXIN 250 MCG
250 TABLET ORAL DAILY
Qty: 90 TABLET | Refills: 3 | Status: SHIPPED | OUTPATIENT
Start: 2025-05-13 | End: 2026-05-13

## 2025-05-13 ASSESSMENT — PATIENT HEALTH QUESTIONNAIRE - PHQ9
2. FEELING DOWN, DEPRESSED OR HOPELESS: NOT AT ALL
1. LITTLE INTEREST OR PLEASURE IN DOING THINGS: NOT AT ALL
SUM OF ALL RESPONSES TO PHQ9 QUESTIONS 1 AND 2: 0

## 2025-05-13 ASSESSMENT — ENCOUNTER SYMPTOMS
LOSS OF SENSATION IN FEET: 0
DEPRESSION: 0
OCCASIONAL FEELINGS OF UNSTEADINESS: 1

## 2025-05-13 ASSESSMENT — PAIN SCALES - GENERAL: PAINLEVEL_OUTOF10: 0-NO PAIN

## 2025-05-13 NOTE — PATIENT INSTRUCTIONS
To reach Dr. Cochran's office please call [phone] (Joy) Fax [phone]. Call [phone] to schedule an appointment. You may also contact the HF RNs at [email or web address]>  (Please include your name and date of birth)    Please wear a 7 day holter monitor   Please START Digoxin 250mcg daily   Please schedule an appointment with the electrophysiology NP ([phone])  Please have blood work in 1 week  Please schedule an appointment with Dr. Cochran in 4 weeks

## 2025-05-13 NOTE — PROGRESS NOTES
Chief Complaint:   Follow up visit      History Of Present Illness:    Endy Nuñez is a 64 y.o. male who is here for a follow up visit .     Currently denies chest pain, shortness of breath, dyspnea on exertion, orthopnea, PND. Patient denies headaches, dizziness or recent falls.   Patient states frequent fatigue, tachycardia especially at night (140s), BLE edema present & palpitations.     Hospitalizations: patient denies     His PMH is listed below   1- Afib /flutter and cardiomyopathy with an EF of 35% ( 11/2020) , at that time he underwent a right and LHC , non obstructive CAD  high filling pressures and CI of 2.2,  subsequently he had a HASEEB guided cardioversion on 11/24 /2020 followed by a dofetilide load .    2-He failed the  dofetilide load and had PVI and RFA of atrial flutter n December 2020 with an EF that improved to 45-50%.    3- Reverted to Afib in 11/2022 , then had a HASEEB guided cardioversion on 12/2022 , was on flecainide and then  amiodarone at that point     4-PVI , posterior and anterior wall scar modification RFA done on 3/2023 . Monitor placed afterwards did not show recurrence of Afib but he did have SVTs . His GDMT was adjusted by the EP team in the setting of dizziness ( drop in entresto dose )   Amiodarone was stopped October 2023     In addition he has a history of Sleep apnea ( not using the CPAP ) and pulmonary nodules ( most likely of benign etiology ) He is here because of increased shortness of breath .   He is SOB on mild exertion , in addition is reporting orthopnea , PND   Has been taking double his usual dose of loop diuretics   His most recent echocardiogram dates from November 2021 and his EF was 45-50% then .  EKG from today shows NSR at a rate of 64 bpm   HASEEB ( 12/2022)    1. Left ventricular systolic function is low normal with a 50% estimated ejection fraction.   2. The left atrium is moderately dilated.   3. There is no thrombus visualized in the left atrial appendage.   4.  "The patient is in atrial fibrillation which may influence the estimate of left ventricular function and transvalvular flows.           Last Recorded Vitals:  Vitals:    05/13/25 1138   BP: 95/67   BP Location: Left arm   Patient Position: Sitting   BP Cuff Size: Large adult   Pulse: 92   SpO2: 99%   Weight: 81 kg (178 lb 9.6 oz)   Height: 1.727 m (5' 8\")       Past Medical History:  He has a past medical history of Atrial fibrillation (Multi), Cardiomyopathy, CHF (congestive heart failure), Hyperlipidemia, Hypertension, Mild CAD (11/23/2020), and Sleep apnea.    Past Surgical History:  He has a past surgical history that includes Rotator cuff repair; Ankle surgery; Hand tendon surgery (10/19/2015); Other surgical history (02/11/2021 and 2/2023); and Cardiac electrophysiology mapping and ablation (03/06/2023).      Social History:  He reports that he quit smoking about 20 years ago. His smoking use included cigarettes. He has never used smokeless tobacco. He reports current alcohol use of about 2.0 standard drinks of alcohol per week. He reports that he does not use drugs.    Family History:  Family History   Problem Relation Name Age of Onset    Atrial fibrillation Mother      Heart failure Mother          Allergies:  Patient has no known allergies.    Outpatient Medications:  Current Outpatient Medications   Medication Instructions    atorvastatin (LIPITOR) 10 mg, oral, Daily, as directed    brimonidine (AlphaGAN) 0.2 % ophthalmic solution 1 drop, Right Eye, 2 times daily    Eliquis 5 mg, oral, 2 times daily    Entresto  mg tablet 1 tablet, oral, 2 times daily    Farxiga 10 mg, oral, Daily    furosemide (Lasix) 20 mg tablet TAKE 1 TABLET BY MOUTH ONCE DAILY    methIMAzole (TAPAZOLE) 5 mg, oral, Daily    metoprolol succinate XL (TOPROL-XL) 100 mg, oral, 2 times daily, Do not crush or chew.    omeprazole (PRILOSEC) 40 mg, oral, 2 times daily before meals, Do not crush or chew.    omeprazole (PRILOSEC) 40 mg, " oral, Daily, Do not crush or chew.    timolol (Timoptic) 0.5 % ophthalmic solution USE 1 DROP IN THE RIGHT EYE EVERY MORNING.    timolol (Timoptic) 0.5 % ophthalmic solution 1 drop, Right Eye, Every morning       Physical Exam:  Gen: NAD , Aox3  HEENT: JVP at the clavicle   Heart: regular , no murmurs   Lungs : clear resonant , normal air entry bilaterally   Abdomen : soft , non tender   Ext : warm , +1 pitting edema to the knee bilaterally   Neuro: grossly intact        Last Labs:  CBC -  Lab Results   Component Value Date    WBC 7.8 05/12/2025    HGB 14.9 05/12/2025    HCT 45.7 05/12/2025    MCV 90.1 05/12/2025     05/12/2025       CMP -  Lab Results   Component Value Date    CALCIUM 9.2 05/12/2025    PHOS 3.1 02/27/2024    PROT 6.8 05/12/2025    ALBUMIN 4.4 05/12/2025    AST 23 05/12/2025    ALT 12 05/12/2025    ALKPHOS 65 05/12/2025    BILITOT 1.4 (H) 05/12/2025       LIPID PANEL -   Lab Results   Component Value Date    CHOL 140 05/20/2024    TRIG 64 05/20/2024    HDL 53.6 05/20/2024    CHHDL 2.6 05/20/2024    LDLF 93 09/25/2023    VLDL 13 05/20/2024    NHDL 86 05/20/2024       RENAL FUNCTION PANEL -   Lab Results   Component Value Date    GLUCOSE 82 05/12/2025     (L) 05/12/2025    K 5.1 05/12/2025     05/12/2025    CO2 20 05/12/2025    ANIONGAP 11 05/12/2025    BUN 31 (H) 05/12/2025    CREATININE 1.24 05/12/2025    GFRMALE 64 07/31/2023    CALCIUM 9.2 05/12/2025    PHOS 3.1 02/27/2024    ALBUMIN 4.4 05/12/2025        Lab Results   Component Value Date     (H) 05/12/2025     A/P   Mr Nuñez is a 63 year old man with a PMH significant for Afib /flutter since 2020 who has had cardioversions and PVI performed , the last one being in March 2023  , most recent EF on his echocardiogram from 2021 was 45-50%   Chief complaint is worsening shortness of breath   Mildly hypervolemic on examination today     HFpEF   Will optimize his GDMT , will add farxiga 10 mg daily along with spironolactone 25  mg daily which had previously been held .   Will continue toprol Xl 100 mg daily and entresto  mg BID   He is currently taking 40 mg of lasix PO daily and will increase that to 40 mg BID until he looses around 3-4 pounds   Recheck a renal function panel and BNP level   Recheck an echocardiogram     Sleep apnea   Untreated sleep apnea is a concern , suspect nocturnal arrhythmia and  of SOB , reinforced need to comply with CPAP therapy     Afib   Normal Sinus rhythm on EKG today but has episodes of reported Afib on his apple watch , mainly nocturnal Will inform Dr Francisco     Follow up in a couple of weeks to reevaluate

## 2025-05-14 ENCOUNTER — PHARMACY VISIT (OUTPATIENT)
Dept: PHARMACY | Facility: CLINIC | Age: 65
End: 2025-05-14
Payer: COMMERCIAL

## 2025-05-14 LAB
ATRIAL RATE: 250 BPM
P AXIS: 79 DEGREES
P OFFSET: 212 MS
P ONSET: 175 MS
Q ONSET: 222 MS
QRS COUNT: 15 BEATS
QRS DURATION: 76 MS
QT INTERVAL: 346 MS
QTC CALCULATION(BAZETT): 425 MS
QTC FREDERICIA: 397 MS
R AXIS: -9 DEGREES
T AXIS: -45 DEGREES
T OFFSET: 395 MS
VENTRICULAR RATE: 91 BPM

## 2025-05-16 ENCOUNTER — APPOINTMENT (OUTPATIENT)
Dept: CARDIOLOGY | Facility: CLINIC | Age: 65
End: 2025-05-16
Payer: COMMERCIAL

## 2025-05-20 DIAGNOSIS — I48.91 ATRIAL FIBRILLATION, UNSPECIFIED TYPE (MULTI): ICD-10-CM

## 2025-05-23 LAB — DIGOXIN SERPL-MCNC: 0.8 MCG/L (ref 0.8–2)

## 2025-05-26 DIAGNOSIS — I48.91 ATRIAL FIBRILLATION, UNSPECIFIED TYPE (MULTI): ICD-10-CM

## 2025-05-27 RX ORDER — METOPROLOL SUCCINATE 100 MG/1
100 TABLET, EXTENDED RELEASE ORAL 2 TIMES DAILY
Qty: 180 TABLET | Refills: 0 | Status: SHIPPED | OUTPATIENT
Start: 2025-05-27 | End: 2025-08-25

## 2025-05-29 PROCEDURE — RXMED WILLOW AMBULATORY MEDICATION CHARGE

## 2025-06-03 PROCEDURE — RXMED WILLOW AMBULATORY MEDICATION CHARGE

## 2025-06-05 ENCOUNTER — PHARMACY VISIT (OUTPATIENT)
Dept: PHARMACY | Facility: CLINIC | Age: 65
End: 2025-06-05
Payer: COMMERCIAL

## 2025-06-17 ENCOUNTER — HOSPITAL ENCOUNTER (INPATIENT)
Age: 65
End: 2025-06-17
Attending: INTERNAL MEDICINE | Admitting: INTERNAL MEDICINE
Payer: COMMERCIAL

## 2025-06-17 ENCOUNTER — OFFICE VISIT (OUTPATIENT)
Dept: CARDIOLOGY | Facility: HOSPITAL | Age: 65
End: 2025-06-17
Payer: COMMERCIAL

## 2025-06-17 ENCOUNTER — HOSPITAL ENCOUNTER (OUTPATIENT)
Dept: CARDIOLOGY | Facility: HOSPITAL | Age: 65
Discharge: HOME | End: 2025-06-17
Payer: COMMERCIAL

## 2025-06-17 VITALS
BODY MASS INDEX: 27.48 KG/M2 | HEART RATE: 73 BPM | HEIGHT: 68 IN | WEIGHT: 181.3 LBS | SYSTOLIC BLOOD PRESSURE: 113 MMHG | OXYGEN SATURATION: 100 % | DIASTOLIC BLOOD PRESSURE: 72 MMHG

## 2025-06-17 DIAGNOSIS — I50.9 CHRONIC CONGESTIVE HEART FAILURE, UNSPECIFIED HEART FAILURE TYPE: ICD-10-CM

## 2025-06-17 DIAGNOSIS — I71.21 ANEURYSM OF ASCENDING AORTA WITHOUT RUPTURE: ICD-10-CM

## 2025-06-17 DIAGNOSIS — I48.91 ATRIAL FIBRILLATION (MULTI): Primary | ICD-10-CM

## 2025-06-17 DIAGNOSIS — I42.8 OTHER CARDIOMYOPATHY: ICD-10-CM

## 2025-06-17 DIAGNOSIS — I48.91 ATRIAL FIBRILLATION, UNSPECIFIED TYPE (MULTI): ICD-10-CM

## 2025-06-17 DIAGNOSIS — R94.31 ABNORMAL EKG: Primary | ICD-10-CM

## 2025-06-17 DIAGNOSIS — Z79.01 ANTICOAGULATION MANAGEMENT ENCOUNTER: ICD-10-CM

## 2025-06-17 DIAGNOSIS — I48.91 ATRIAL FIBRILLATION, UNSPECIFIED TYPE (MULTI): Primary | ICD-10-CM

## 2025-06-17 DIAGNOSIS — Z51.81 ANTICOAGULATION MANAGEMENT ENCOUNTER: ICD-10-CM

## 2025-06-17 PROCEDURE — 99213 OFFICE O/P EST LOW 20 MIN: CPT

## 2025-06-17 PROCEDURE — 99211 OFF/OP EST MAY X REQ PHY/QHP: CPT | Mod: 25,27

## 2025-06-17 PROCEDURE — 1159F MED LIST DOCD IN RCRD: CPT | Performed by: INTERNAL MEDICINE

## 2025-06-17 PROCEDURE — 1036F TOBACCO NON-USER: CPT | Performed by: INTERNAL MEDICINE

## 2025-06-17 PROCEDURE — 93010 ELECTROCARDIOGRAM REPORT: CPT | Performed by: INTERNAL MEDICINE

## 2025-06-17 PROCEDURE — 99214 OFFICE O/P EST MOD 30 MIN: CPT | Performed by: INTERNAL MEDICINE

## 2025-06-17 PROCEDURE — 1126F AMNT PAIN NOTED NONE PRSNT: CPT | Performed by: INTERNAL MEDICINE

## 2025-06-17 PROCEDURE — 99213 OFFICE O/P EST LOW 20 MIN: CPT | Performed by: INTERNAL MEDICINE

## 2025-06-17 PROCEDURE — 3008F BODY MASS INDEX DOCD: CPT | Performed by: INTERNAL MEDICINE

## 2025-06-17 ASSESSMENT — PAIN SCALES - GENERAL: PAINLEVEL_OUTOF10: 0-NO PAIN

## 2025-06-17 NOTE — PROGRESS NOTES
I had a pleasure seeing Endy Nuñez. He is 62 year old with:         1. AF and AFL - index diagnosis in November of 2020. He underwent HASEEB guded cardioversion 11/24/2020. He reverted into atrial flutter right afterwards and was started on Dofetilide. The dose had to be reduced to 125 mcg because of the prolong QT but he still continue to have the episodes of AF and AFL . At the same time he was diagnosed with EF of 35 % and underwent SG guided CHF treatment        On 12/08/2020 he underwent PVI and RFA of atrial flutter. He is feeling better and better as the days go by. ECHO 12/17/2020 showed EF of 45-50%.      He continue to have episodes of fast heart rate on his Fitbit. He is still not quite back to his baseline but his significantly better after the ablation. A monitor that he wore in February of 2021 shows 94% sinus rhythm.     FEB 2021: In all likelihood he has tachycardia induced cardiomyopathy. His predominant rhythm was atrial flutter/tach and AF. He failed Tikosyn. He is s/p RFA of AFL and PVI 12/08/2020 He is feeling better and better but he is not back to his baseline. His monitor in February of 2021 showed 6% burden of AF. At this time I would not like to change the medical therapy. We will repeat the ECHO in 2-3 months time. If needed as far as antiarrhythmics we can consider Flecainide but he is still in the first three months post RFA. In addition, the burden is so low that I don't think so is clinically very significant.     ECHO: TTE (October of 2021) LVEF 60-65%. Ascending aorta 4.1 cm. Mild LVH. No significant valve disease. LVIDd 4.25 cm.    Flecainide was started at Dr. Cochran's last visit 11/15/2022 what he was found to be back in Afib.  He then underwent successful HASEEB guided DCCV on 12/8/2022  At some point the Flecainide was switched to Amiodarone      Now s/p redo PVI, posterior and anterior wall scar modification RFA with me  3/6/2023  ECG 4/18/2023 SB HR 52 bpm  Amiodarone was stopped  post ablation  ECG 6/30/2023 SB Hr 56 bpm normal intervals. Amiodarone was resumed due to recurrent arrhythmia symptoms and abnormal ECGs on his smart watch       Holter monitor 6/30-7/13/23 SB-ST, max  bpm, minimum HR 37bpm, avg 60 bpm, <1% VE burden, 1% SVE burden, 211 SVT episodes, longest 481 beats, fastest 161 on 6/30. SVT episodes appear to be Atach  ECG 7/28/2023 NSR HR 54 bpm, QTc 479 ms  Patient had low BP and was dizzy at the visit in July.  Lab work revealed some mild anemia and his Entresto dose was cut in half.  Patient just got a refill of his medication and requested that he cut his current pills in half instead of getting a new prescription.     ECG 10/27/2023 SB with PAC HR 58 bpm, Qtc 486 ms, amiodarone was stopped  ECG 4/26/2024 Atrial Flutter  bpm  Patient started back on Amiodarone  ECG 5/24/2024 Atrial Flutter HR 97 bpm     05/2024  patient presents for follow up after restarting amiodarone.  He feels slightly better and states his heart rate has improved, but he still feels like he is out of normal sinus rhythm.  He admits to MCNAMARA, worsening LE edema, and orthopnea.      TODAY: He comes in to follow up on atypical flutter. Has not been taking Amiodarone.  Recent monitor shower Atypical Flutter     Active Problems  Problems    · Aneurysm of ascending aorta (441.2) (I71.2)   · Atrial fibrillation (427.31) (I48.91)   · Atypical chest pain (786.59) (R07.89)   · Cardiomyopathy (425.4) (I42.9)   · CHF (congestive heart failure) (428.0) (I50.9)   · MCNAMARA (dyspnea on exertion) (786.09) (R06.09)   · Elevated coronary artery calcium score (414.00) (R93.1)   · History of HFrEF (heart failure with reduced ejection fraction) (428.20) (I50.20)   · Localized swelling of right foot (729.81) (R22.41)   · Obstructive sleep apnea, adult (327.23) (G47.33)   · Palpitations (785.1) (R00.2)   · Pulmonary nodules (793.19) (R91.8)   · Snores (786.09) (R06.83)   · Suspected sleep apnea (781.99) (R29.818)   ·  Thumb pain, right (729.5) (M79.644)   · Trigger thumb (727.03) (M65.319)     Surgical History  Problems    · History of Ankle Surgery   · History of Catheter ablation   · Managed By: Yasmani Francisco (Cardiology)   · AF/AFL   · History of Hand Incision Tendon Sheath Of A Finger   · Managed By: Ta Johnson (Plastic Surgery)   · History of Rotator Cuff Repair   · History of Shoulder Surgery   · right and left     Past Medical History  Problems    · History of HFrEF (heart failure with reduced ejection fraction) (428.20) (I50.20)   · Resolved Date: 25 Jan 2021   · Nasal abscess (478.19) (J34.0)   · Resolved Date: 13 Oct 2017   · Nasal vestibulitis (478.19) (J34.89)   · Resolved Date: 13 Oct 2017   · No pertinent past medical history     Current Meds     Medication Name Instruction   Atorvastatin Calcium 10 MG Oral Tablet TAKE 1 TABLET DAILY AS DIRECTED.   Eliquis 5 MG Oral Tablet TAKE 1 TABLET BY MOUTH TWICE DAILY   Entresto  MG Oral Tablet TAKE 1 TABLET BY MOUTH TWICE A DAY   Furosemide 20 MG Oral Tablet TAKE 1 TABLET BY MOUTH DAILY   Metoprolol Succinate  MG Oral Tablet Extended Release 24 Hour take 1 1/2 tablets by mouth daily   Spironolactone 25 MG Oral Tablet TAKE 0.5 TABLET Daily      Allergies  Medication    · No Known Drug Allergies   Recorded By: Ananya Martinez; 10/12/2015 11:58:17 AM     Family History  Mother    · Family history of atrial fibrillation (V17.49) (Z82.49)   · Family history of congestive heart failure (V17.49) (Z82.49)     Social History  Problems    · Daily caffeinated coffee consumption   · 2-3 cups daily   · Divorce (V61.03) (Z63.5)   · Does not use illicit drugs (V49.89) (Z78.9)   · Former smoker (V15.82) (Z87.891)   · Quit 2007, smoked 1 PPD for ~30 years   · Lives with family   · Moderate alcohol use   · Occupation   · electrical enginer   · Yardwork     Review of Systems     Constitutional: not feeling tired.   Eyes: no eyesight problems.   ENT: no hearing loss and no nosebleeds.    Cardiovascular: no intermittent leg claudication and as noted in HPI.   Respiratory: no chronic cough and no shortness of breath.   Gastrointestinal: no change in bowel habits and no blood in stools.   Genitourinary: no urinary frequency and no hematuria.   Skin: no skin rashes.   Neurological: no seizures and no frequent falls.   Psychiatric: no depression and not suicidal.   All other systems have been reviewed and are negative for complaint.      Vitals  Vital Signs     Recorded: [id number] 02:48PM   Heart Rate 54   Systolic 105, LUE, Sitting   Diastolic 69, LUE, Sitting   Blood Pressure Cuff Size Adult   Height 5 ft 7 in   Weight 185 lb 0.5 oz   BMI Calculated 28.98 kg/m2   BSA Calculated 1.96   O2 Saturation 97, RA      Physical Exam     Constitutional: alert and in no acute distress.   Eyes: no erythema, swelling or discharge from the eye .   Neck: neck is supple, symmetric, trachea midline, no masses  and no thyromegaly .   Pulmonary: no increased work of breathing or signs of respiratory distress  and lungs clear to auscultation.    Cardiovascular: carotid pulses 2+ bilaterally with no bruit , JVP was normal, no thrills , regular rhythm, normal S1 and S2, no murmurs , pedal pulses 2+ bilaterally  and no edema .   Abdomen: abdomen non-tender, no masses  and no hepatomegaly .   Skin: skin warm and dry, normal skin turgor .   Psychiatric judgment and insight is normal  and oriented to person, place and time .      Impressions      1. Atypical Flutter - he is rate controlled now with Digoxin. He still has some signs of congestive heart failure.         PLAN: Discussed all the options - 1. Observation 2. Admission for heart failure and I would favor restarting Dofetilide and repeating the cardioversion.

## 2025-06-17 NOTE — PATIENT INSTRUCTIONS
Thank you for coming to see us today! To reach Dr. Cochran's office please call [phone].  Fax [phone]. Call [phone] to schedule an appointment. You may also contact the HF RNs at [email or web address]  (Please include your name and date of birth)  For MEDICATION REFILLS, please call [phone] option 6 then option 1.    We will plan for you to be direct admitted Thursday next week.   Please continue to take your Lasix.

## 2025-06-17 NOTE — PROGRESS NOTES
Chief Complaint:   Follow up visit      History Of Present Illness:    Endy Nuñez is a 64 y.o. male  who is here for a follow up visit .      His PMH is listed below   1- Afib /flutter and cardiomyopathy with an EF of 35% ( 11/2020) , at that time he underwent a right and LHC , non obstructive CAD  high filling pressures and CI of 2.2,  subsequently he had a HASEEB guided cardioversion on 11/24 /2020 followed by a dofetilide load .     2-He failed the  dofetilide load and had PVI and RFA of atrial flutter in December 2020 with an EF that improved to 45-50%.     3- Reverted to Afib in 11/2022 , then had a HASEEB guided cardioversion on 12/2022 , was on flecainide and then  amiodarone at that point      4-PVI , posterior and anterior wall scar modification RFA done on 3/2023 . Monitor placed afterwards did not show recurrence of Afib but he did have SVTs . His GDMT was adjusted by the EP team in the setting of dizziness ( drop in entresto dose )   Amiodarone was stopped October 2023      5- Reverted back into Atrial flutter and amiodarone was restarted in April 2024 by Dr Francisco- Developed hyperthyroidism which resulted in amiodarone being stopped by endocrine and he was started on methimazole by endocrinology      In addition he has a history of Sleep apnea ( not using the CPAP ) and pulmonary nodules ( most likely of benign etiology )    6- Seen in clinic on 5/13/2025 - noted to be in Atrial flutter with variable block ,Started on digoxin , Holter monitor placed and discussed with Dr Francisco     Office visit ( 6/17/2025)   He returns today for a follow up visit . His HR has been better controlled since the addition of digoxin to his regimen .   He denies having SOB ,has recently traveled to [place] and endorses dietary indiscretions and variable compliance with loop diuretics with resulting lower extremity edema .   He continues to report decreased energy and reduction in his exertional tolerance .  His Holter monitor  shows he is in 100% atrial flutter   His EKG today shows a HR of 65bpm , and Atrial flutter        Last Recorded Vitals:  Vitals:    06/17/25 0914   BP: 113/72   Pulse: 73   SpO2: 100%             Past Medical History:  He has a past medical history of Atrial fibrillation (Multi), Cardiomyopathy, CHF (congestive heart failure), Hyperlipidemia, Hypertension, Mild CAD (11/23/2020), and Sleep apnea.    Past Surgical History:  He has a past surgical history that includes Rotator cuff repair; Ankle surgery; Hand tendon surgery (10/19/2015); Other surgical history (02/11/2021 and 2/2023); and Cardiac electrophysiology mapping and ablation (03/06/2023).      Social History:  He reports that he quit smoking about 20 years ago. His smoking use included cigarettes. He has never used smokeless tobacco. He reports current alcohol use of about 2.0 standard drinks of alcohol per week. He reports that he does not use drugs.    Family History:  Family History[1]     Allergies:  Patient has no known allergies.    Outpatient Medications:  Current Outpatient Medications   Medication Instructions    atorvastatin (LIPITOR) 10 mg, oral, Daily, as directed    brimonidine (AlphaGAN) 0.2 % ophthalmic solution 1 drop, Right Eye, 2 times daily    digoxin (LANOXIN) 250 mcg, oral, Daily    Eliquis 5 mg, oral, 2 times daily    Entresto  mg tablet 1 tablet, oral, 2 times daily    Farxiga 10 mg, oral, Daily    furosemide (Lasix) 20 mg tablet TAKE 1 TABLET BY MOUTH ONCE DAILY    methIMAzole (TAPAZOLE) 5 mg, oral, Daily    metoprolol succinate XL (TOPROL-XL) 100 mg, oral, 2 times daily, Do not crush or chew.    omeprazole (PRILOSEC) 40 mg, oral, 2 times daily before meals, Do not crush or chew.    omeprazole (PRILOSEC) 40 mg, oral, Daily, Do not crush or chew.    timolol (Timoptic) 0.5 % ophthalmic solution USE 1 DROP IN THE RIGHT EYE EVERY MORNING.    timolol (Timoptic) 0.5 % ophthalmic solution 1 drop, Right Eye, Every morning       Physical  Exam:  GEN: NAD , AOX3  HEENT : JVP at the clavicle   Heart :regularly irregular   Lungs : clear , resonant , normal air entry bilaterally   Abdomen : soft , non tender   Ext: warm , well perfused , +1 pitting edema at the ankles   Neuro : grossly intact       Last Labs:  CBC -  Lab Results   Component Value Date    WBC 7.8 05/12/2025    HGB 14.9 05/12/2025    HCT 45.7 05/12/2025    MCV 90.1 05/12/2025     05/12/2025       CMP -  Lab Results   Component Value Date    CALCIUM 9.2 05/12/2025    PHOS 3.1 02/27/2024    PROT 6.8 05/12/2025    ALBUMIN 4.4 05/12/2025    AST 23 05/12/2025    ALT 12 05/12/2025    ALKPHOS 65 05/12/2025    BILITOT 1.4 (H) 05/12/2025       LIPID PANEL -   Lab Results   Component Value Date    CHOL 140 05/20/2024    TRIG 64 05/20/2024    HDL 53.6 05/20/2024    CHHDL 2.6 05/20/2024    LDLF 93 09/25/2023    VLDL 13 05/20/2024    NHDL 86 05/20/2024       RENAL FUNCTION PANEL -   Lab Results   Component Value Date    GLUCOSE 82 05/12/2025     (L) 05/12/2025    K 5.1 05/12/2025     05/12/2025    CO2 20 05/12/2025    ANIONGAP 11 05/12/2025    BUN 31 (H) 05/12/2025    CREATININE 1.24 05/12/2025    GFRMALE 64 07/31/2023    CALCIUM 9.2 05/12/2025    PHOS 3.1 02/27/2024    ALBUMIN 4.4 05/12/2025        Lab Results   Component Value Date     (H) 05/12/2025       Ejection Fractions:  EF   Date/Time Value Ref Range Status   05/13/2025 10:55 AM 51 %          Assessment/Plan     Mr Nuñez is a 64 year old man with a PMH significant for Afib /flutter since 2020 who has had cardioversions and PVI performed , the last one being in March 2023  , most recent EF on his echocardiogram from today is stable and low normal at 51%, unfortunately he has reverted to symptomatic atrial flutter      #HFpEF   Currently on farxiga 10 mg daily  Has not started spironolactone and will defer initiation as his K is bordeline at 5.1 , will start after he resumes his loop diuretics and we have a repeat renal  function panel   Remains on toprol Xl 100 mg daily along with entresto  mg BID   Advised him to take lasix 20 mg daily and increase it to 40 mg daily if he has not lost weight by Friday ,    #Atrial fibrillation /Atrial flutter   Has had prior cardioversions and ablations   Tried on dofetilide and amiodarone   Amiodarone had to be stopped secondary to to hyperthyroidism   Rate controlled on digoxin but after review with Dr Francisco , a rhythm control strategy is favored for him secondary to his decompensation and symptoms felt while in Atrial flutter - will plan to admit him next Thursday for tikosyn initiation , will be admitted to HVI team to also optimize his volume status and his GDMT regimen      #Sleep apnea   Compliance with CPAP reinforced      Follow up after his hospitalization          [1]   Family History  Problem Relation Name Age of Onset    Atrial fibrillation Mother      Heart failure Mother

## 2025-06-18 LAB — BODY SURFACE AREA: 1.97 M2

## 2025-06-19 LAB
ATRIAL RATE: 65 BPM
P AXIS: 56 DEGREES
P OFFSET: 92 MS
P ONSET: 66 MS
PR INTERVAL: 314 MS
Q ONSET: 223 MS
QRS COUNT: 11 BEATS
QRS DURATION: 78 MS
QT INTERVAL: 456 MS
QTC CALCULATION(BAZETT): 474 MS
QTC FREDERICIA: 468 MS
R AXIS: 1 DEGREES
T AXIS: -14 DEGREES
T OFFSET: 451 MS
VENTRICULAR RATE: 65 BPM

## 2025-06-19 PROCEDURE — 93005 ELECTROCARDIOGRAM TRACING: CPT

## 2025-06-20 ENCOUNTER — APPOINTMENT (OUTPATIENT)
Dept: CARDIOLOGY | Facility: CLINIC | Age: 65
End: 2025-06-20
Payer: COMMERCIAL

## 2025-06-23 DIAGNOSIS — I48.91 ATRIAL FIBRILLATION, UNSPECIFIED TYPE (MULTI): ICD-10-CM

## 2025-06-26 ENCOUNTER — APPOINTMENT (OUTPATIENT)
Dept: CARDIOLOGY | Facility: HOSPITAL | Age: 65
DRG: 310 | End: 2025-06-26
Payer: COMMERCIAL

## 2025-06-26 LAB
ALBUMIN SERPL BCP-MCNC: 4.4 G/DL (ref 3.4–5)
ALP SERPL-CCNC: 60 U/L (ref 33–136)
ALT SERPL W P-5'-P-CCNC: 12 U/L (ref 10–52)
ANION GAP SERPL CALC-SCNC: 13 MMOL/L (ref 10–20)
AST SERPL W P-5'-P-CCNC: 17 U/L (ref 9–39)
BILIRUB SERPL-MCNC: 0.4 MG/DL (ref 0–1.2)
BNP SERPL-MCNC: 218 PG/ML (ref 0–99)
BUN SERPL-MCNC: 23 MG/DL (ref 6–23)
CALCIUM SERPL-MCNC: 9.2 MG/DL (ref 8.6–10.6)
CHLORIDE SERPL-SCNC: 106 MMOL/L (ref 98–107)
CO2 SERPL-SCNC: 24 MMOL/L (ref 21–32)
CREAT SERPL-MCNC: 1.1 MG/DL (ref 0.5–1.3)
EGFRCR SERPLBLD CKD-EPI 2021: 74 ML/MIN/1.73M*2
ERYTHROCYTE [DISTWIDTH] IN BLOOD BY AUTOMATED COUNT: 13.3 % (ref 11.5–14.5)
GLUCOSE SERPL-MCNC: 104 MG/DL (ref 74–99)
HCT VFR BLD AUTO: 42.7 % (ref 41–52)
HGB BLD-MCNC: 14 G/DL (ref 13.5–17.5)
MAGNESIUM SERPL-MCNC: 2.23 MG/DL (ref 1.6–2.4)
MCH RBC QN AUTO: 29.3 PG (ref 26–34)
MCHC RBC AUTO-ENTMCNC: 32.8 G/DL (ref 32–36)
MCV RBC AUTO: 89 FL (ref 80–100)
NRBC BLD-RTO: 0 /100 WBCS (ref 0–0)
PLATELET # BLD AUTO: 194 X10*3/UL (ref 150–450)
POTASSIUM SERPL-SCNC: 4.6 MMOL/L (ref 3.5–5.3)
PROT SERPL-MCNC: 6.8 G/DL (ref 6.4–8.2)
RBC # BLD AUTO: 4.78 X10*6/UL (ref 4.5–5.9)
SODIUM SERPL-SCNC: 138 MMOL/L (ref 136–145)
T4 FREE SERPL-MCNC: 1.27 NG/DL (ref 0.78–1.48)
TSH SERPL-ACNC: 6.27 MIU/L (ref 0.44–3.98)
WBC # BLD AUTO: 6.3 X10*3/UL (ref 4.4–11.3)

## 2025-06-26 PROCEDURE — 80053 COMPREHEN METABOLIC PANEL: CPT | Performed by: PHYSICIAN ASSISTANT

## 2025-06-26 PROCEDURE — 83880 ASSAY OF NATRIURETIC PEPTIDE: CPT | Performed by: PHYSICIAN ASSISTANT

## 2025-06-26 PROCEDURE — 93005 ELECTROCARDIOGRAM TRACING: CPT

## 2025-06-26 PROCEDURE — 1200000002 HC GENERAL ROOM WITH TELEMETRY DAILY

## 2025-06-26 PROCEDURE — 93010 ELECTROCARDIOGRAM REPORT: CPT | Performed by: INTERNAL MEDICINE

## 2025-06-26 PROCEDURE — 2500000001 HC RX 250 WO HCPCS SELF ADMINISTERED DRUGS (ALT 637 FOR MEDICARE OP): Performed by: PHYSICIAN ASSISTANT

## 2025-06-26 PROCEDURE — 85027 COMPLETE CBC AUTOMATED: CPT | Performed by: PHYSICIAN ASSISTANT

## 2025-06-26 PROCEDURE — 83735 ASSAY OF MAGNESIUM: CPT | Performed by: PHYSICIAN ASSISTANT

## 2025-06-26 PROCEDURE — 2500000001 HC RX 250 WO HCPCS SELF ADMINISTERED DRUGS (ALT 637 FOR MEDICARE OP)

## 2025-06-26 PROCEDURE — 84439 ASSAY OF FREE THYROXINE: CPT | Performed by: PHYSICIAN ASSISTANT

## 2025-06-26 PROCEDURE — 84443 ASSAY THYROID STIM HORMONE: CPT | Performed by: PHYSICIAN ASSISTANT

## 2025-06-26 PROCEDURE — 36415 COLL VENOUS BLD VENIPUNCTURE: CPT | Performed by: PHYSICIAN ASSISTANT

## 2025-06-26 RX ORDER — POLYETHYLENE GLYCOL 3350 17 G/17G
17 POWDER, FOR SOLUTION ORAL DAILY PRN
Status: DISCONTINUED | OUTPATIENT
Start: 2025-06-26 | End: 2025-06-29 | Stop reason: HOSPADM

## 2025-06-26 RX ORDER — ACETAMINOPHEN 650 MG/1
650 SUPPOSITORY RECTAL EVERY 4 HOURS PRN
Status: DISCONTINUED | OUTPATIENT
Start: 2025-06-26 | End: 2025-06-29 | Stop reason: HOSPADM

## 2025-06-26 RX ORDER — DIGOXIN 250 MCG
250 TABLET ORAL DAILY
Status: DISCONTINUED | OUTPATIENT
Start: 2025-06-26 | End: 2025-06-27

## 2025-06-26 RX ORDER — DIGOXIN 250 MCG
250 TABLET ORAL DAILY
Status: DISCONTINUED | OUTPATIENT
Start: 2025-06-26 | End: 2025-06-26

## 2025-06-26 RX ORDER — TALC
3 POWDER (GRAM) TOPICAL NIGHTLY PRN
Status: DISCONTINUED | OUTPATIENT
Start: 2025-06-26 | End: 2025-06-29 | Stop reason: HOSPADM

## 2025-06-26 RX ORDER — ATORVASTATIN CALCIUM 10 MG/1
10 TABLET, FILM COATED ORAL DAILY
Status: DISCONTINUED | OUTPATIENT
Start: 2025-06-26 | End: 2025-06-29 | Stop reason: HOSPADM

## 2025-06-26 RX ORDER — METOPROLOL SUCCINATE 100 MG/1
100 TABLET, EXTENDED RELEASE ORAL 2 TIMES DAILY
Status: DISCONTINUED | OUTPATIENT
Start: 2025-06-26 | End: 2025-06-27

## 2025-06-26 RX ORDER — FUROSEMIDE 20 MG/1
20 TABLET ORAL DAILY PRN
Status: DISCONTINUED | OUTPATIENT
Start: 2025-06-26 | End: 2025-06-26

## 2025-06-26 RX ORDER — METHIMAZOLE 5 MG/1
5 TABLET ORAL DAILY
Status: DISCONTINUED | OUTPATIENT
Start: 2025-06-27 | End: 2025-06-26

## 2025-06-26 RX ORDER — METHIMAZOLE 5 MG/1
5 TABLET ORAL DAILY
Status: DISCONTINUED | OUTPATIENT
Start: 2025-06-26 | End: 2025-06-29 | Stop reason: HOSPADM

## 2025-06-26 RX ORDER — FUROSEMIDE 20 MG/1
20 TABLET ORAL DAILY
Status: DISCONTINUED | OUTPATIENT
Start: 2025-06-27 | End: 2025-06-27

## 2025-06-26 RX ORDER — BRIMONIDINE TARTRATE 2 MG/ML
1 SOLUTION/ DROPS OPHTHALMIC 2 TIMES DAILY
Status: DISCONTINUED | OUTPATIENT
Start: 2025-06-26 | End: 2025-06-29 | Stop reason: HOSPADM

## 2025-06-26 RX ORDER — DAPAGLIFLOZIN 10 MG/1
10 TABLET, FILM COATED ORAL DAILY
Status: DISCONTINUED | OUTPATIENT
Start: 2025-06-27 | End: 2025-06-29 | Stop reason: HOSPADM

## 2025-06-26 RX ORDER — ACETAMINOPHEN 160 MG/5ML
650 SOLUTION ORAL EVERY 4 HOURS PRN
Status: DISCONTINUED | OUTPATIENT
Start: 2025-06-26 | End: 2025-06-29 | Stop reason: HOSPADM

## 2025-06-26 RX ORDER — DOFETILIDE 0.25 MG/1
250 CAPSULE ORAL EVERY 12 HOURS SCHEDULED
Status: DISCONTINUED | OUTPATIENT
Start: 2025-06-26 | End: 2025-06-29 | Stop reason: HOSPADM

## 2025-06-26 RX ORDER — ACETAMINOPHEN 325 MG/1
650 TABLET ORAL EVERY 4 HOURS PRN
Status: DISCONTINUED | OUTPATIENT
Start: 2025-06-26 | End: 2025-06-29 | Stop reason: HOSPADM

## 2025-06-26 RX ADMIN — DOFETILIDE 250 MCG: 0.25 CAPSULE ORAL at 21:00

## 2025-06-26 RX ADMIN — BRIMONIDINE TARTRATE 1 DROP: 2 SOLUTION/ DROPS OPHTHALMIC at 21:01

## 2025-06-26 RX ADMIN — APIXABAN 5 MG: 5 TABLET, FILM COATED ORAL at 21:00

## 2025-06-26 RX ADMIN — ATORVASTATIN CALCIUM 10 MG: 10 TABLET, FILM COATED ORAL at 21:02

## 2025-06-26 RX ADMIN — METOPROLOL SUCCINATE 100 MG: 25 TABLET, EXTENDED RELEASE ORAL at 21:00

## 2025-06-26 RX ADMIN — SACUBITRIL AND VALSARTAN 1 TABLET: 97; 103 TABLET, FILM COATED ORAL at 21:00

## 2025-06-26 RX ADMIN — METHIMAZOLE 5 MG: 5 TABLET ORAL at 21:00

## 2025-06-26 SDOH — ECONOMIC STABILITY: HOUSING INSECURITY: AT ANY TIME IN THE PAST 12 MONTHS, WERE YOU HOMELESS OR LIVING IN A SHELTER (INCLUDING NOW)?: NO

## 2025-06-26 SDOH — ECONOMIC STABILITY: HOUSING INSECURITY: IN THE LAST 12 MONTHS, WAS THERE A TIME WHEN YOU WERE NOT ABLE TO PAY THE MORTGAGE OR RENT ON TIME?: NO

## 2025-06-26 SDOH — ECONOMIC STABILITY: TRANSPORTATION INSECURITY: IN THE PAST 12 MONTHS, HAS LACK OF TRANSPORTATION KEPT YOU FROM MEDICAL APPOINTMENTS OR FROM GETTING MEDICATIONS?: NO

## 2025-06-26 SDOH — SOCIAL STABILITY: SOCIAL INSECURITY
WITHIN THE LAST YEAR, HAVE YOU BEEN KICKED, HIT, SLAPPED, OR OTHERWISE PHYSICALLY HURT BY YOUR PARTNER OR EX-PARTNER?: NO

## 2025-06-26 SDOH — ECONOMIC STABILITY: INCOME INSECURITY: IN THE PAST 12 MONTHS HAS THE ELECTRIC, GAS, OIL, OR WATER COMPANY THREATENED TO SHUT OFF SERVICES IN YOUR HOME?: NO

## 2025-06-26 SDOH — ECONOMIC STABILITY: FOOD INSECURITY: WITHIN THE PAST 12 MONTHS, THE FOOD YOU BOUGHT JUST DIDN'T LAST AND YOU DIDN'T HAVE MONEY TO GET MORE.: NEVER TRUE

## 2025-06-26 SDOH — ECONOMIC STABILITY: FOOD INSECURITY: WITHIN THE PAST 12 MONTHS, YOU WORRIED THAT YOUR FOOD WOULD RUN OUT BEFORE YOU GOT THE MONEY TO BUY MORE.: NEVER TRUE

## 2025-06-26 SDOH — ECONOMIC STABILITY: FOOD INSECURITY: HOW HARD IS IT FOR YOU TO PAY FOR THE VERY BASICS LIKE FOOD, HOUSING, MEDICAL CARE, AND HEATING?: PATIENT DECLINED

## 2025-06-26 SDOH — SOCIAL STABILITY: SOCIAL INSECURITY
WITHIN THE LAST YEAR, HAVE YOU BEEN RAPED OR FORCED TO HAVE ANY KIND OF SEXUAL ACTIVITY BY YOUR PARTNER OR EX-PARTNER?: NO

## 2025-06-26 SDOH — SOCIAL STABILITY: SOCIAL INSECURITY: WITHIN THE LAST YEAR, HAVE YOU BEEN AFRAID OF YOUR PARTNER OR EX-PARTNER?: NO

## 2025-06-26 SDOH — SOCIAL STABILITY: SOCIAL INSECURITY: WITHIN THE LAST YEAR, HAVE YOU BEEN HUMILIATED OR EMOTIONALLY ABUSED IN OTHER WAYS BY YOUR PARTNER OR EX-PARTNER?: NO

## 2025-06-26 SDOH — SOCIAL STABILITY: SOCIAL INSECURITY: DO YOU FEEL ANYONE HAS EXPLOITED OR TAKEN ADVANTAGE OF YOU FINANCIALLY OR OF YOUR PERSONAL PROPERTY?: NO

## 2025-06-26 SDOH — SOCIAL STABILITY: SOCIAL INSECURITY: ARE YOU OR HAVE YOU BEEN THREATENED OR ABUSED PHYSICALLY, EMOTIONALLY, OR SEXUALLY BY ANYONE?: NO

## 2025-06-26 SDOH — SOCIAL STABILITY: SOCIAL INSECURITY: DOES ANYONE TRY TO KEEP YOU FROM HAVING/CONTACTING OTHER FRIENDS OR DOING THINGS OUTSIDE YOUR HOME?: NO

## 2025-06-26 SDOH — SOCIAL STABILITY: SOCIAL INSECURITY: WERE YOU ABLE TO COMPLETE ALL THE BEHAVIORAL HEALTH SCREENINGS?: NO

## 2025-06-26 SDOH — SOCIAL STABILITY: SOCIAL INSECURITY: ARE THERE ANY APPARENT SIGNS OF INJURIES/BEHAVIORS THAT COULD BE RELATED TO ABUSE/NEGLECT?: NO

## 2025-06-26 SDOH — ECONOMIC STABILITY: HOUSING INSECURITY: IN THE PAST 12 MONTHS, HOW MANY TIMES HAVE YOU MOVED WHERE YOU WERE LIVING?: 0

## 2025-06-26 SDOH — SOCIAL STABILITY: SOCIAL INSECURITY: ABUSE: ADULT

## 2025-06-26 SDOH — SOCIAL STABILITY: SOCIAL INSECURITY: DO YOU FEEL UNSAFE GOING BACK TO THE PLACE WHERE YOU ARE LIVING?: NO

## 2025-06-26 SDOH — SOCIAL STABILITY: SOCIAL INSECURITY: HAS ANYONE EVER THREATENED TO HURT YOUR FAMILY OR YOUR PETS?: NO

## 2025-06-26 SDOH — SOCIAL STABILITY: SOCIAL INSECURITY: HAVE YOU HAD THOUGHTS OF HARMING ANYONE ELSE?: NO

## 2025-06-26 SDOH — SOCIAL STABILITY: SOCIAL INSECURITY: HAVE YOU HAD ANY THOUGHTS OF HARMING ANYONE ELSE?: NO

## 2025-06-26 ASSESSMENT — COGNITIVE AND FUNCTIONAL STATUS - GENERAL
MOBILITY SCORE: 24
DAILY ACTIVITIY SCORE: 24
DAILY ACTIVITIY SCORE: 24
PATIENT BASELINE BEDBOUND: NO
MOBILITY SCORE: 24

## 2025-06-26 ASSESSMENT — ACTIVITIES OF DAILY LIVING (ADL)
BATHING: INDEPENDENT
ADEQUATE_TO_COMPLETE_ADL: YES
PATIENT'S MEMORY ADEQUATE TO SAFELY COMPLETE DAILY ACTIVITIES?: YES
WALKS IN HOME: INDEPENDENT
FEEDING YOURSELF: INDEPENDENT
LACK_OF_TRANSPORTATION: NO
HEARING - LEFT EAR: FUNCTIONAL
TOILETING: INDEPENDENT
DRESSING YOURSELF: INDEPENDENT
GROOMING: INDEPENDENT
HEARING - RIGHT EAR: FUNCTIONAL
JUDGMENT_ADEQUATE_SAFELY_COMPLETE_DAILY_ACTIVITIES: YES
LACK_OF_TRANSPORTATION: NO

## 2025-06-26 ASSESSMENT — ENCOUNTER SYMPTOMS
NAUSEA: 0
DIZZINESS: 0
CHEST TIGHTNESS: 0
PALPITATIONS: 0
VOMITING: 0
DYSURIA: 0
DIFFICULTY URINATING: 0
COUGH: 0
LIGHT-HEADEDNESS: 0
CHILLS: 0
DIARRHEA: 0

## 2025-06-26 ASSESSMENT — LIFESTYLE VARIABLES
HOW MANY STANDARD DRINKS CONTAINING ALCOHOL DO YOU HAVE ON A TYPICAL DAY: 1 OR 2
HOW OFTEN DO YOU HAVE A DRINK CONTAINING ALCOHOL: MONTHLY OR LESS
HOW OFTEN DO YOU HAVE 6 OR MORE DRINKS ON ONE OCCASION: NEVER
AUDIT-C TOTAL SCORE: 1
AUDIT-C TOTAL SCORE: 1
SKIP TO QUESTIONS 9-10: 1

## 2025-06-26 ASSESSMENT — PAIN - FUNCTIONAL ASSESSMENT: PAIN_FUNCTIONAL_ASSESSMENT: 0-10

## 2025-06-26 ASSESSMENT — PATIENT HEALTH QUESTIONNAIRE - PHQ9
1. LITTLE INTEREST OR PLEASURE IN DOING THINGS: NOT AT ALL
2. FEELING DOWN, DEPRESSED OR HOPELESS: NOT AT ALL
SUM OF ALL RESPONSES TO PHQ9 QUESTIONS 1 & 2: 0

## 2025-06-26 ASSESSMENT — PAIN SCALES - GENERAL: PAINLEVEL_OUTOF10: 0 - NO PAIN

## 2025-06-26 NOTE — H&P
History Of Present Illness:    Endy Nuñez is a 64 y.o. male with a PMHx of tachymediated caridomyopathy with improved EF, AF/AFL s/p PVI and RFA x2 (2020, 2022), untreated TENISHA, mild non obstructive CAD,  & amiodarone induced hyperthyroidism. Admitted for tikosyn loading.     Electrophysiologist: Yasmani Francisco MD  Primary Cardiologist: Eliana Be MD    Arrhythmia History in brief  - s/p HASEEB w/DCCV 2020  with quick reversion to AFL. Dofetilide initiated. Dose limited to 125mcg due to prolong QT.  - Continued to have break through AF/AFL  - s/p  PVI and RFA of AFL 12/2020. EF subsequent improved to 45-50%.  - Recurrent AF 11/2022. Flecainide initiated. S/p successful DCCV 12/2022.  Flecainide switched to (?at some point) amiodarone.  - s/p PVI posterior and anterior wall scar modification RFA 3/2023. No recurrence on heart monitor. Amiodarone discontinued 10/2023.   - Recurrent symptomatic  AFL 4/2024 and amiodarone resumed. Later discontinued by endocrinology due to new hyperthyroidism. Started on methimazole.  - Rate controlled with digoxin 5/2025.    6/17 Dr. Be visit: AFL rates better controlled with addition of digoxin. No SOB. Had indiscretion with dietary and diuretic with travel resulting in edema. Reported decreased energy and exertional intolerance. EKG AFL 65bpm.     6/17 Dr. Yasmani Francisco visit. Pt will atypical flutter. Rate controlled on digoxin. Having signs of CHF. Discussed options of either observation vs restarting dofetilide with subsequent DCCV.    Today, he tells me he feels dyspnea and fatigue on exertion with his arrhythmia ever since it began. Denies illnesses in last couple weeks. No chest pain, resting SOB, lightheadedness or dizziness. Less palpitations / rapid heart beats with taking digoxin. Leg swelling and weight improving near baseline since taking lasix daily per Dr. Naylor recommendations last visit. No orthopnea. No missed doses of eliquis in the last 4wks.     Last  Recorded Vitals:  There were no vitals filed for this visit.    Last Labs:  CBC - 5/12/2025:  7:59 AM  7.8 14.9 195    45.7      CMP - 5/12/2025:  7:59 AM  9.2 6.8 23 --- 1.4   _ 4.4 12 65      PTT - No results in last year.  _   _ _     B TYPE NATRIURETIC PEPTIDE (BNP)   Date/Time Value Ref Range Status   05/12/2025 07:59  (H) <100 pg/mL Final     Comment:        BNP levels increase with age in the general  population with the highest values seen in  individuals greater than 75 years of age.  Reference: J. Am. Guillermina. Cardiol. 2002; 40:976-982.          BNP   Date/Time Value Ref Range Status   02/27/2024 10:38  (H) 0 - 99 pg/mL Final   11/15/2022 09:17  (H) 0 - 99 pg/mL Final     Comment:     .  <100 pg/mL - Heart failure unlikely  100-299 pg/mL - Intermediate probability of acute heart  .               failure exacerbation. Correlate with clinical  .               context and patient history.    >=300 pg/mL - Heart Failure likely. Correlate with clinical  .               context and patient history.   Biotin interference may cause falsely decreased results.   Patients taking a Biotin dose of up to 5 mg/day should   refrain from taking Biotin for 24 hours before sample   collection. Providers may contact their local laboratory   for further information.     11/23/2020 10:16  (H) 0 - 99 pg/mL Final     Comment:     .  <100 pg/mL - Heart failure unlikely  100-299 pg/mL - Intermediate probability of acute heart  .               failure exacerbation. Correlate with clinical  .               context and patient history.    >=300 pg/mL - Heart Failure likely. Correlate with clinical  .               context and patient history.   Biotin interference may cause falsely decreased results.   Patients taking a Biotin dose of up to 5 mg/day should   refrain from taking Biotin for 24 hours before sample   collection. Providers may contact their local laboratory   for further information.       LDL  Calculated   Date/Time Value Ref Range Status   05/20/2024 08:10 AM 74 <=99 mg/dL Final     Comment:                                 Near   Borderline      AGE      Desirable  Optimal    High     High     Very High     0-19 Y     0 - 109     ---    110-129   >/= 130     ----    20-24 Y     0 - 119     ---    120-159   >/= 160     ----      >24 Y     0 -  99   100-129  130-159   160-189     >/=190       VLDL   Date/Time Value Ref Range Status   05/20/2024 08:10 AM 13 0 - 40 mg/dL Final   09/25/2023 09:03 AM 32 0 - 40 mg/dL Final   11/23/2020 10:16 AM 15 0 - 40 mg/dL Final   11/16/2020 05:00 PM 18 0 - 40 mg/dL Final      Last I/O:  No intake/output data recorded.      Past Medical History:  He has a past medical history of Atrial fibrillation (Multi), Cardiomyopathy, CHF (congestive heart failure), Hyperlipidemia, Hypertension, Mild CAD (11/23/2020), and Sleep apnea.    Past Surgical History:  He has a past surgical history that includes Rotator cuff repair; Ankle surgery; Hand tendon surgery (10/19/2015); Other surgical history (02/11/2021 and 2/2023); and Cardiac electrophysiology mapping and ablation (03/06/2023).      Social History:  He reports that he quit smoking about 20 years ago. His smoking use included cigarettes. He has never used smokeless tobacco. He reports current alcohol use of about 2.0 standard drinks of alcohol per week. He reports that he does not use drugs.    Family History:  Family History[1]     Allergies:  Patient has no known allergies.    Inpatient Medications:  Scheduled Medications[2]  PRN Medications[3]  Continuous Medications[4]  Outpatient Medications:  Current Outpatient Medications   Medication Instructions    apixaban (ELIQUIS) 5 mg, oral, 2 times daily    atorvastatin (LIPITOR) 10 mg, oral, Daily, as directed    brimonidine (AlphaGAN) 0.2 % ophthalmic solution 1 drop, Right Eye, 2 times daily    digoxin (LANOXIN) 250 mcg, oral, Daily    Entresto  mg tablet 1 tablet, oral, 2  times daily    Farxiga 10 mg, oral, Daily    furosemide (Lasix) 20 mg tablet TAKE 1 TABLET BY MOUTH ONCE DAILY    methIMAzole (TAPAZOLE) 5 mg, oral, Daily    metoprolol succinate XL (TOPROL-XL) 100 mg, oral, 2 times daily, Do not crush or chew.    omeprazole (PRILOSEC) 40 mg, oral, 2 times daily before meals, Do not crush or chew.    omeprazole (PRILOSEC) 40 mg, oral, Daily, Do not crush or chew.    timolol (Timoptic) 0.5 % ophthalmic solution USE 1 DROP IN THE RIGHT EYE EVERY MORNING.    timolol (Timoptic) 0.5 % ophthalmic solution 1 drop, Right Eye, Every morning     Review of Systems   Constitutional:  Negative for chills.   HENT:  Negative for congestion.    Respiratory:  Negative for cough and chest tightness.    Cardiovascular:  Negative for chest pain and palpitations.   Gastrointestinal:  Negative for diarrhea, nausea and vomiting.   Genitourinary:  Negative for difficulty urinating and dysuria.   Neurological:  Negative for dizziness and light-headedness.       Physical Exam:  General: NAD  Head/ neck: no JVD, no carotid bruits  Cardiac: irregularly irregular rate and rhythm, regular S1 S2 , no murmur, no rub, no gallop  Pulm: CTA bilaterally, no wheezes, rales or rhonchi.    Vascular: Radial 2+ bilaterally  GI: soft, non distended  Extremities: trace, non pitting bilateral lower extremity edema   Neuro: no focal neuro deficits   Psych: appropriate mood and behavior   Skin: warm and dry        Assessment/Plan   Endy Nuñez is a 64 y.o. male with a PMHx of tachymediated caridomyopathy with normalized EF, AF/AFL s/p PVI and RFA x2 (2020, 2022), untreated TENISHA, mild non-obstructive CAD, & amiodarone induced hyperthyroidism. Admitted for tikosyn loading.    Recurrent, symptomatic Atrial fibrillation & Atrial flutter  S/p PVI and RF ablation 2020, redo PVI with scar modification 3/2023  Arrhythmia History in brief:  - s/p HASEEB w/DCCV 2020  with quick reversion to AFL. Dofetilide initiated. Dose limited to 125mcg  due to prolong QT.  - Continued to have break through AF/AFL  - s/p  PVI and RFA of AFL 12/2020. EF subsequent improved to 45-50%.  - Recurrent AF 11/2022. Flecainide initiated. S/p successful DCCV 12/2022.  Flecainide switched to (?at some point) amiodarone.  - s/p PVI posterior and anterior wall scar modification RFA 3/2023. No recurrence on heart monitor. Amiodarone discontinued 10/2023.   - Recurrent symptomatic  AFL 4/2024 and amiodarone resumed. Later discontinued by endocrinology due to new hyperthyroidism. Started on methimazole.  - Rate controlled with digoxin 5/2025.    - Some symptomatic improvement noted with digoxin but persistent dyspnea and fatigue on exertion persist.  - EKG pending  - Hold digoxin in light of possible DCCV tomorrow  - RFP and CBC pending  - No missed doses of eliquis in last 4wks per patient.  - Plan for tikosyn loading 250 mcg q12hrs with EKG 2hrs post administration for QT evaluation pending initial EKG intervals    Tachy-mediated cardiomyopathy 35%> 50%  - TTE 5/13/25: EF 51%, in AF/AFL. Mildly dilated LA, moderately dilated RA.   - Appears volume compensated on daily low dose lasix. Hold pending possible DCCV- can resume after DCCV.  - BNP pending  - GDMT: metoprolol, entresto, farxiga    Untreated TENISHA  - Recommend outpt evaluation    Mild Non obstructive CAD  - [facility] 2020: Co dominant system. Mild non obstructive dz (mRCA 10-30%)  - Eliquis in place of aspirin, statin    Hyperthyroidism   - 2/2 amiodarone use  - Methimazole  - TSH with reflex T4 pending    DVT ppx: eliquis  DISPO: Pending arrhythmia management  Code Status: Full Code    I spent 60 minutes in the professional and overall care of this patient. To be seen and discussed with attending physician in AM.  Gavi Shahid PA-C         [1]   Family History  Problem Relation Name Age of Onset    Atrial fibrillation Mother      Heart failure Mother     [2]   Scheduled medications   Medication Dose Route Frequency     apixaban  5 mg oral BID    atorvastatin  10 mg oral Daily    brimonidine  1 drop Right Eye BID    [START ON 6/27/2025] dapagliflozin propanediol  10 mg oral Daily    [Held by provider] digoxin  250 mcg oral Daily    [Held by provider] furosemide  20 mg oral Daily    methIMAzole  5 mg oral Daily    metoprolol succinate XL  100 mg oral BID    sacubitriL-valsartan  1 tablet oral BID   [3]   PRN medications   Medication    acetaminophen    Or    acetaminophen    Or    acetaminophen    melatonin    polyethylene glycol   [4]   Continuous Medications   Medication Dose Last Rate

## 2025-06-27 ENCOUNTER — APPOINTMENT (OUTPATIENT)
Dept: CARDIOLOGY | Facility: HOSPITAL | Age: 65
DRG: 310 | End: 2025-06-27
Payer: COMMERCIAL

## 2025-06-27 LAB
ALBUMIN SERPL BCP-MCNC: 3.6 G/DL (ref 3.4–5)
ALBUMIN SERPL BCP-MCNC: 4.2 G/DL (ref 3.4–5)
ALP SERPL-CCNC: 48 U/L (ref 33–136)
ALT SERPL W P-5'-P-CCNC: 10 U/L (ref 10–52)
ANION GAP SERPL CALC-SCNC: 12 MMOL/L (ref 10–20)
ANION GAP SERPL CALC-SCNC: 9 MMOL/L (ref 10–20)
AST SERPL W P-5'-P-CCNC: 14 U/L (ref 9–39)
BILIRUB SERPL-MCNC: 0.4 MG/DL (ref 0–1.2)
BUN SERPL-MCNC: 18 MG/DL (ref 6–23)
BUN SERPL-MCNC: 19 MG/DL (ref 6–23)
CALCIUM SERPL-MCNC: 8.6 MG/DL (ref 8.6–10.6)
CALCIUM SERPL-MCNC: 9.1 MG/DL (ref 8.6–10.6)
CHLORIDE SERPL-SCNC: 104 MMOL/L (ref 98–107)
CHLORIDE SERPL-SCNC: 109 MMOL/L (ref 98–107)
CO2 SERPL-SCNC: 24 MMOL/L (ref 21–32)
CO2 SERPL-SCNC: 24 MMOL/L (ref 21–32)
CREAT SERPL-MCNC: 0.86 MG/DL (ref 0.5–1.3)
CREAT SERPL-MCNC: 0.95 MG/DL (ref 0.5–1.3)
EGFRCR SERPLBLD CKD-EPI 2021: 88 ML/MIN/1.73M*2
EGFRCR SERPLBLD CKD-EPI 2021: >90 ML/MIN/1.73M*2
ERYTHROCYTE [DISTWIDTH] IN BLOOD BY AUTOMATED COUNT: 13.3 % (ref 11.5–14.5)
ERYTHROCYTE [DISTWIDTH] IN BLOOD BY AUTOMATED COUNT: 13.4 % (ref 11.5–14.5)
GLUCOSE SERPL-MCNC: 109 MG/DL (ref 74–99)
GLUCOSE SERPL-MCNC: 81 MG/DL (ref 74–99)
HCT VFR BLD AUTO: 38.4 % (ref 41–52)
HCT VFR BLD AUTO: 40.8 % (ref 41–52)
HGB BLD-MCNC: 12.2 G/DL (ref 13.5–17.5)
HGB BLD-MCNC: 13.5 G/DL (ref 13.5–17.5)
HOLD SPECIMEN: NORMAL
MAGNESIUM SERPL-MCNC: 2.05 MG/DL (ref 1.6–2.4)
MCH RBC QN AUTO: 28.8 PG (ref 26–34)
MCH RBC QN AUTO: 29.6 PG (ref 26–34)
MCHC RBC AUTO-ENTMCNC: 31.8 G/DL (ref 32–36)
MCHC RBC AUTO-ENTMCNC: 33.1 G/DL (ref 32–36)
MCV RBC AUTO: 90 FL (ref 80–100)
MCV RBC AUTO: 91 FL (ref 80–100)
NRBC BLD-RTO: 0 /100 WBCS (ref 0–0)
NRBC BLD-RTO: 0 /100 WBCS (ref 0–0)
PHOSPHATE SERPL-MCNC: 3.9 MG/DL (ref 2.5–4.9)
PHOSPHATE SERPL-MCNC: 4.2 MG/DL (ref 2.5–4.9)
PLATELET # BLD AUTO: 138 X10*3/UL (ref 150–450)
PLATELET # BLD AUTO: 170 X10*3/UL (ref 150–450)
POTASSIUM SERPL-SCNC: 3.7 MMOL/L (ref 3.5–5.3)
POTASSIUM SERPL-SCNC: 4 MMOL/L (ref 3.5–5.3)
PROT SERPL-MCNC: 5.9 G/DL (ref 6.4–8.2)
RBC # BLD AUTO: 4.24 X10*6/UL (ref 4.5–5.9)
RBC # BLD AUTO: 4.56 X10*6/UL (ref 4.5–5.9)
SODIUM SERPL-SCNC: 136 MMOL/L (ref 136–145)
SODIUM SERPL-SCNC: 138 MMOL/L (ref 136–145)
WBC # BLD AUTO: 4.2 X10*3/UL (ref 4.4–11.3)
WBC # BLD AUTO: 6.4 X10*3/UL (ref 4.4–11.3)

## 2025-06-27 PROCEDURE — 2500000001 HC RX 250 WO HCPCS SELF ADMINISTERED DRUGS (ALT 637 FOR MEDICARE OP)

## 2025-06-27 PROCEDURE — 80053 COMPREHEN METABOLIC PANEL: CPT

## 2025-06-27 PROCEDURE — 80069 RENAL FUNCTION PANEL: CPT | Mod: CCI | Performed by: PHYSICIAN ASSISTANT

## 2025-06-27 PROCEDURE — 85027 COMPLETE CBC AUTOMATED: CPT | Performed by: PHYSICIAN ASSISTANT

## 2025-06-27 PROCEDURE — 36415 COLL VENOUS BLD VENIPUNCTURE: CPT

## 2025-06-27 PROCEDURE — 2500000001 HC RX 250 WO HCPCS SELF ADMINISTERED DRUGS (ALT 637 FOR MEDICARE OP): Performed by: PHYSICIAN ASSISTANT

## 2025-06-27 PROCEDURE — 93005 ELECTROCARDIOGRAM TRACING: CPT

## 2025-06-27 PROCEDURE — 1200000002 HC GENERAL ROOM WITH TELEMETRY DAILY

## 2025-06-27 PROCEDURE — 36415 COLL VENOUS BLD VENIPUNCTURE: CPT | Performed by: PHYSICIAN ASSISTANT

## 2025-06-27 PROCEDURE — 84100 ASSAY OF PHOSPHORUS: CPT

## 2025-06-27 PROCEDURE — 2500000002 HC RX 250 W HCPCS SELF ADMINISTERED DRUGS (ALT 637 FOR MEDICARE OP, ALT 636 FOR OP/ED): Performed by: PHYSICIAN ASSISTANT

## 2025-06-27 PROCEDURE — 85027 COMPLETE CBC AUTOMATED: CPT

## 2025-06-27 PROCEDURE — 83735 ASSAY OF MAGNESIUM: CPT

## 2025-06-27 PROCEDURE — 93010 ELECTROCARDIOGRAM REPORT: CPT | Performed by: INTERNAL MEDICINE

## 2025-06-27 PROCEDURE — 99223 1ST HOSP IP/OBS HIGH 75: CPT | Performed by: STUDENT IN AN ORGANIZED HEALTH CARE EDUCATION/TRAINING PROGRAM

## 2025-06-27 PROCEDURE — 83735 ASSAY OF MAGNESIUM: CPT | Performed by: STUDENT IN AN ORGANIZED HEALTH CARE EDUCATION/TRAINING PROGRAM

## 2025-06-27 RX ORDER — POTASSIUM CHLORIDE 20 MEQ/1
20 TABLET, EXTENDED RELEASE ORAL ONCE
Status: COMPLETED | OUTPATIENT
Start: 2025-06-27 | End: 2025-06-27

## 2025-06-27 RX ORDER — FUROSEMIDE 20 MG/1
20 TABLET ORAL DAILY
Status: DISCONTINUED | OUTPATIENT
Start: 2025-06-27 | End: 2025-06-29 | Stop reason: HOSPADM

## 2025-06-27 RX ADMIN — BRIMONIDINE TARTRATE 1 DROP: 2 SOLUTION/ DROPS OPHTHALMIC at 09:18

## 2025-06-27 RX ADMIN — APIXABAN 5 MG: 5 TABLET, FILM COATED ORAL at 20:59

## 2025-06-27 RX ADMIN — DOFETILIDE 250 MCG: 0.25 CAPSULE ORAL at 09:16

## 2025-06-27 RX ADMIN — APIXABAN 5 MG: 5 TABLET, FILM COATED ORAL at 09:16

## 2025-06-27 RX ADMIN — METOPROLOL SUCCINATE 100 MG: 25 TABLET, EXTENDED RELEASE ORAL at 09:16

## 2025-06-27 RX ADMIN — METOPROLOL SUCCINATE 75 MG: 25 TABLET, EXTENDED RELEASE ORAL at 20:59

## 2025-06-27 RX ADMIN — POTASSIUM CHLORIDE 20 MEQ: 1500 TABLET, EXTENDED RELEASE ORAL at 07:49

## 2025-06-27 RX ADMIN — DAPAGLIFLOZIN 10 MG: 10 TABLET, FILM COATED ORAL at 09:16

## 2025-06-27 RX ADMIN — SACUBITRIL AND VALSARTAN 1 TABLET: 97; 103 TABLET, FILM COATED ORAL at 09:16

## 2025-06-27 RX ADMIN — DOFETILIDE 250 MCG: 0.25 CAPSULE ORAL at 21:00

## 2025-06-27 RX ADMIN — SACUBITRIL AND VALSARTAN 1 TABLET: 97; 103 TABLET, FILM COATED ORAL at 20:59

## 2025-06-27 RX ADMIN — METHIMAZOLE 5 MG: 5 TABLET ORAL at 09:18

## 2025-06-27 RX ADMIN — FUROSEMIDE 20 MG: 20 TABLET ORAL at 10:55

## 2025-06-27 ASSESSMENT — PAIN - FUNCTIONAL ASSESSMENT: PAIN_FUNCTIONAL_ASSESSMENT: 0-10

## 2025-06-27 ASSESSMENT — COGNITIVE AND FUNCTIONAL STATUS - GENERAL
DAILY ACTIVITIY SCORE: 24
MOBILITY SCORE: 24
MOBILITY SCORE: 24
DAILY ACTIVITIY SCORE: 24

## 2025-06-27 ASSESSMENT — PAIN SCALES - GENERAL
PAINLEVEL_OUTOF10: 0 - NO PAIN
PAINLEVEL_OUTOF10: 0 - NO PAIN

## 2025-06-27 ASSESSMENT — ACTIVITIES OF DAILY LIVING (ADL): LACK_OF_TRANSPORTATION: NO

## 2025-06-27 NOTE — PROGRESS NOTES
HVI Attending Shared Visit Note    This is a shared visit. Please see Advanced Practice Provider's encounter note for additional details.      Briefly, 64M from [facility] admitted for dofetilide (Tikosyn) re-initiation and planned DCCV for symptomatic atypical AFL. He has a history with PVI and AFL ablations in 2020 and 2023, transient sinus rhythm, and recurrent AFL since 4/2024 following discontinuation of amiodarone due to hyperthyroidism. He remains anticoagulated and is off digoxin in preparation for cardioversion.    05/13/2025 TTE: EF 51%, mildly dilated LA, moderately dilated RA, reduced RV function, trace MR/TR, no pericardial effusion.  06/19/2025 ECG: Atrial flutter  11/23/2020 LHC/RHC: Mild non-obstructive CAD, co-dominant system, elevated filling pressures (RA 15, PCWP 22), normal CO.    Started dofetilide 250 o/n with self conversion to NSR. Monitor QTc.     Exam: Regular, Warm, no distress. Walking room    #AFL/AF, symptomatic persistent atypical: c/w fetilide 250 mcg q12h load with telemetry and QTC monitoring. Rhythm control strategy favored due to exertional intolerance. Last ECG QTc 474 ms. QTc this AM 440ms.   #Tachy-mediated cardiomyopathy, recovered EF: EF 51% on 5/13. On GDMT (metoprolol, sacubitril-valsartan, dapagliflozin). Reduced RV function.     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 TSH 6.27 on 6/26. Monitor thyroid function.    #Mild non-obstructive CAD: [facility] 2020 with <30% mRCA stenosis. On atorvastatin and Eliquis.    #Dispo: Awaiting completion of dofetilide loading and planned cardioversion. Discharge when rhythm stabilized and volume/GDMT regimen re-established.  Assessment & Plan  Heart failure    Atrial fibrillation (Multi)      Tez Nicholas MD    Objective   Admit Date: 6/26/2025  Hospital Length of Stay: 0   Home: [facility] [id number]    MEDICATIONS  Infusions:     Scheduled:  apixaban, 5 mg, BID  atorvastatin, 10 mg, Daily  brimonidine, 1  "drop, BID  [START ON 6/27/2025] dapagliflozin propanediol, 10 mg, Daily  [Held by provider] digoxin, 250 mcg, Daily  dofetilide, 250 mcg, q12h DIANDRA  [Held by provider] furosemide, 20 mg, Daily  methIMAzole, 5 mg, Daily  metoprolol succinate XL, 100 mg, BID  sacubitriL-valsartan, 1 tablet, BID      PRN:  acetaminophen, 650 mg, q4h PRN   Or  acetaminophen, 650 mg, q4h PRN   Or  acetaminophen, 650 mg, q4h PRN  melatonin, 3 mg, Nightly PRN  polyethylene glycol, 17 g, Daily PRN      Prior to Admission Meds:  Prescriptions Prior to Admission[1]    Vitals:      6/26/2025     7:05 PM 6/17/2025     9:14 AM 5/13/2025    11:38 AM 10/2/2024     8:45 AM 10/2/2024     8:33 AM 10/2/2024     8:23 AM 10/2/2024     7:51 AM   Vitals   Systolic 95 113 95 95 107 97 111   Diastolic 67 72 67 72 57 72 89   BP Location Left arm Right arm Left arm       Heart Rate 66 73 92 87 100 118 127   Temp 36.4 °C (97.5 °F)     36.8 °C (98.2 °F) 36 °C (96.8 °F)   Resp 16   16 15 18 18   Height  1.727 m (5' 8\") 1.727 m (5' 8\")       Weight (lb)  181.3 178.6       BMI  27.57 kg/m2 27.16 kg/m2       BSA (m2)  1.99 m2 1.97 m2       Visit Report  Report    Report Report         Wt Readings from Last 5 Encounters:   06/17/25 82.2 kg (181 lb 4.8 oz)   05/13/25 81 kg (178 lb 9.6 oz)   09/25/24 85.3 kg (188 lb)   08/20/24 85.3 kg (188 lb)   07/11/24 83 kg (183 lb)     No intake or output data in the 24 hours ending 06/26/25 2000    CHEST:  ,    CV:     NEURO:   RASS:    CAM:    LOC:    Cognition:    GCS:      DATA:  CMP:  Recent Labs     06/26/25  1902 05/12/25  0759 06/25/24  1345 05/20/24  0810 02/27/24  1038 10/27/23  0853 07/31/23  1509 11/15/22  0917 10/05/21  0930 11/27/20  0248 11/26/20  0348 11/25/20  1717 11/25/20  0411 11/24/20  0405 11/23/20  1016    134* 138 137 139 139 141 140   < > 137 141 138 138 139 141   K 4.6 5.1 4.7 4.7 4.7 4.5 4.4 4.4   < > 4.6 4.9 4.5 4.2 3.9 4.0    103 103 106 107 106 106 108*   < > 106 107 106 106 101 111*   CO2 " "24 20 25 22 27 26 26 27   < > 21 22 21 22 26 21   ANIONGAP 13 11 15 14 10 12 13 9*   < > 15 17 16 14 16 13   BUN 23 31* 22 20 22 20 21 17   < > 21 21 18 16 24* 19   CREATININE 1.10 1.24 1.09 1.20 1.18 1.20 1.25 1.12   < > 1.11 1.03 0.95 0.93 1.20 0.93   EGFR 74 64 75 67 68 67  --   --   --   --   --   --   --   --   --    MG 2.23  --   --   --   --   --  2.12  --   --  2.26 2.32 2.28 2.31 2.56* 1.96    < > = values in this interval not displayed.     Recent Labs     06/26/25  1902 05/12/25  0759 06/25/24  1345 05/20/24  0810 02/27/24  1038 10/27/23  0853 07/31/23  1509 11/15/22  0917 11/24/20  0405 11/23/20  1016 11/16/20  1700   ALBUMIN 4.4 4.4 4.3 4.0 4.1 4.2 4.1 4.2   < > 3.9 4.1   ALT 12 12 13 11  --  15 11  --   --  20 36   AST 17 23 15 16  --  20 18  --   --  24 44*   BILITOT 0.4 1.4* 0.6 0.6  --  0.6 0.7  --   --  1.3* 0.9    < > = values in this interval not displayed.     CBC:  Recent Labs     06/26/25  1902 05/12/25  0759 06/25/24  1345 06/10/24  1407 05/20/24  0810 10/27/23  0853 07/31/23  1509 11/27/20  0326   WBC 6.3 7.8 6.4 6.9 6.5 6.4 6.8 9.9   HGB 14.0 14.9 14.5 13.6 13.3* 13.5 12.4* 15.6   HCT 42.7 45.7 44.2 41.4 40.7* 41.4 38.9* 47.7    195 211 194 219 176 183 176   MCV 89 90.1 89 90 88 93 95 86     COAG:   Recent Labs     11/27/20  0326 11/26/20  0349 11/25/20  0415 11/24/20  0405 11/23/20  1016   INR 1.4* 1.4* 1.4* 1.4* 1.3*     ABO: No results for input(s): \"ABO\" in the last 27584 hours.  HEME/ENDO:   Recent Labs     06/26/25  1902 05/12/25  0759 09/25/24  0935 06/25/24  1345 06/10/24  1407 05/24/24  1516 05/20/24  0810 07/31/23  1509 11/23/20  1016   FERRITIN  --   --   --   --   --   --   --   --  136   IRONSAT  --   --   --   --   --   --  22*  --  24*   TSH 6.27* 4.43 4.30* 0.01*   < > 0.03* 0.10*   < > 1.50   FREET4  --  1.4 1.29 2.04*  --  2.65* 2.47*   < >  --     < > = values in this interval not displayed.     CARDIAC:   Recent Labs     06/26/25 1902 05/12/25 0759 " "02/27/24  1038 11/15/22  0917 11/23/20  1016 11/16/20  1700   * 164* 124* 437* 299* 519*     Recent Labs     05/20/24  0810 09/25/23  0903 11/23/20  1016 11/16/20  1700   CHOL 140 196 140 175   LDLF  --  93 84 111*   LDLCALC 74  --   --   --    HDL 53.6 70.3 41.7 46.5   TRIG 64 162* 73 88     TOX:No results for input(s): \"AMPHETAMINE\", \"BENZO\", \"CANNABINOID\", \"COCAI\", \"FENTANYL\", \"OPIATE\", \"OXYCODONE\", \"PCP\" in the last 07625 hours.    No lab exists for component: \"BARBSCRUR\"  MICRO: No results for input(s): \"ESR\", \"CRP\", \"PROCAL\" in the last 39215 hours.  No results found for the last 90 days.      EKG:   Recent Labs     06/19/25  1024 05/13/25  1140 05/24/24  0800 04/26/24  0800 02/27/24  0820 10/27/23  0812   ATRRATE 65 250 241   < > 64 58   VENTRATE 65 91 97   < > 64 58   PRINT 314  --   --   --  170 186   QRSDUR 78 76 82   < > 82 88   QTCFRED 468 397 352   < > 457 490   QTCCALCB 474 425 381   < > 462 486    < > = values in this interval not displayed.     Encounter Date: 06/17/25   ECG 12 Lead   Result Value    Ventricular Rate 65    Atrial Rate 65    HI Interval 314    QRS Duration 78    QT Interval 456    QTC Calculation(Bazett) 474    P Axis 56    R Axis 1    T Axis -14    QRS Count 11    Q Onset 223    P Onset 66    P Offset 92    T Offset 451    QTC Fredericia 468    Narrative    Atrial flutter  Nonspecific ST and T wave abnormality  Prolonged QT  Abnormal ECG    Confirmed by Serge Gallagher ([id number]) on 6/19/2025 11:35:29 AM     Echocardiogram:   Recent Labs     05/13/25  1055 02/27/24  0943   EF 51 46   LVIDD 4.30 4.90   RVFRWALLPKSP  --  9.73   TAPSE  --  2.1   Transthoracic Echo (TTE) Complete 05/13/2025    [facility], [address] [place] [address]  Tel [phone] and Fax [phone]    TRANSTHORACIC ECHOCARDIOGRAM REPORT      Patient Name:       OSCAR ANSARI       Reading Physician:    [id number] Live Madrigal MD  Study Date:         5/13/2025           Ordering " Provider:    [id number] ASHLEE DUGGAN  MRN/PID:            [id number]            Fellow:  Accession#:         [id number]        Nurse:  Date of Birth/Age:  1960 / 64 years Sonographer:          GENNARO Garcia RDCS  Gender assigned at  M                   Additional Staff:  Birth:  Height:             172.72 cm           Admit Date:  Weight:             79.38 kg            Admission Status:     Outpatient  BSA / BMI:          1.93 m2 / 26.61     Encounter#:           [id number]  kg/m2  Blood Pressure:     110/70 mmHg         Department Location:  [facility]  Non Invasive    Study Type:    TRANSTHORACIC ECHO (TTE) COMPLETE  Diagnosis/ICD: Paroxysmal atrial fibrillation-I48.0  Indication:    Chronic persistan A-fib  CPT Code:      Echo Complete w Full Doppler-91996  Study Detail: The following Echo studies were performed: 2D, M-Mode, Doppler and  color flow. Patient's heart rhythm is A-fib RVR.      PHYSICIAN INTERPRETATION:  Left Ventricle: Left ventricular ejection fraction is low normal, calculated by Pederson's biplane at 51%. The patient is in atrial fibrillation which may influence the estimate of left ventricular function and transvalvular flows. There are no regional left ventricular wall motion abnormalities. The left ventricular cavity size is normal. There is mildly increased septal and normal posterior left ventricular wall thickness. Left ventricular diastolic filling is indeterminate due to atrial fibrillation/flutter.  Left Atrium: The left atrium is mildly dilated.  Right Ventricle: The right ventricle is normal in size. There is reduced right ventricular systolic function.  Right Atrium: The right atrium is moderately dilated.  Aortic Valve: The aortic valve is structurally normal. There is no evidence of aortic valve regurgitation. The peak instantaneous gradient of the aortic valve is 3 mmHg.  Mitral Valve: The mitral valve is normal in structure. There is trace mitral valve  regurgitation.  Tricuspid Valve: The tricuspid valve is structurally normal. There is trace tricuspid regurgitation. The right ventricular systolic pressure is unable to be estimated.  Pulmonic Valve: The pulmonic valve is not well visualized. The pulmonic valve regurgitation was not well visualized.  Pericardium: There is no pericardial effusion noted.  Aorta: The aortic root is normal. There is upper limits of normal dilatation of the ascending aorta.  Systemic Veins: The inferior vena cava appears small in size.  In comparison to the previous echocardiogram(s): Compared with study dated 2/27/2024, no significant change.      CONCLUSIONS:  1. Left ventricular ejection fraction is low normal, calculated by Pederson's biplane at 51%.  2. Left ventricular diastolic filling is indeterminate due to atrial fibrillation/flutter.  3. There is reduced right ventricular systolic function.  4. The left atrium is mildly dilated.  5. The right atrium is moderately dilated.  6. The patient is in atrial fibrillation which may influence the estimate of left ventricular function and transvalvular flows.    QUANTITATIVE DATA SUMMARY:    2D MEASUREMENTS:          Normal Ranges:  Ao Root d:       3.80 cm  (2.0-3.7cm)  LAs:             4.80 cm  (2.7-4.0cm)  IVSd:            1.30 cm  (0.6-1.1cm)  LVPWd:           0.90 cm  (0.6-1.1cm)  LVIDd:           4.30 cm  (3.9-5.9cm)  LVIDs:           3.30 cm  LV Mass Index:   84 g/m2  LVEDV Index:     43 ml/m2  LV % FS          23.3 %      LEFT ATRIUM:                  Normal Ranges:  LA Vol A4C:        64.6 ml    (22+/-6mL/m2)  LA Vol A2C:        60.5 ml  LA Vol BP:         63.1 ml  LA Vol Index A4C:  33.5ml/m2  LA Vol Index A2C:  31.4 ml/m2  LA Vol Index BP:   32.7 ml/m2  LA Area A4C:       21.0 cm2  LA Area A2C:       20.5 cm2  LA Major Axis A4C: 5.8 cm  LA Major Axis A2C: 5.9 cm      RIGHT ATRIUM:                 Normal Ranges:  RA Vol A4C:        85.8 ml    (8.3-19.5ml)  RA Vol Index A4C:   44.4 ml/m2  RA Area A4C:       24.2 cm2  RA Major Axis A4C: 5.8 cm      AORTA MEASUREMENTS:         Normal Ranges:  Ao Sinus, d:        3.80 cm (2.1-3.5cm)  Asc Ao, d:          3.80 cm (2.1-3.4cm)      LV SYSTOLIC FUNCTION:  Normal Ranges:  EF-A4C View:    57 % (>=55%)  EF-A2C View:    45 %  EF-Biplane:     51 %  EF-3DQ:         56 %  LV EF Reported: 51 %      AORTIC VALVE:           Normal Ranges:  AoV Vmax:      0.88 m/s (<=1.7m/s)  AoV Peak PG:   3.1 mmHg (<20mmHg)  LVOT Max Dylan:  0.65 m/s (<=1.1m/s)  LVOT VTI:      12.00 cm  LVOT Diameter: 2.20 cm  (1.8-2.4cm)  AoV Area,Vmax: 2.82 cm2 (2.5-4.5cm2)      RIGHT VENTRICLE:  RV Basal 4.00 cm  RV Mid   2.10 cm  RV Major 6.5 cm      PULMONIC VALVE:          Normal Ranges:  PV Max Dylan:     0.6 m/s  (0.6-0.9m/s)  PV Max P.3 mmHg      [id number] Live Madrigal MD  Electronically signed on 2025 at 12:29:20 PM        ** Final **    Coronary Angiography:   Adult Cath     Narrative  [facility], Cath Lab, [address] [place] [address]    Cardiovascular Catheterization Report    Patient Name:     OSCAR Schwarz           [id number] Aly Paris MD  PERIC       Physician:  Study Date:       2020  Verifying Physician: [id number] Aly Paris MD  MRN/PID:          [id number]    Cardiologist:  Accession/Order#: [id number]   Fellow:              [id number] Mamta Quiroz MD  YOB: 1960    Fellow:  Gender:           M           Referring Physician:  Admit Date:                   Referring Physician: [id number] Mirella Mathews MD  Surgeon:                      Referring Physician: [id number] Rolo Bright MD      Study: Left and Right Heart Catheterization      Indications:  OSCAR ANSARI is a 60 year old male who presents with atrial fibrillation. Cardiomyopathy and left ventricular dysfunction, with an asymptomatic chest pain assessment. Study performed as an elective cath procedure.    Appropriate Use Criteria:  Known or suspected  cardiomyopathy with or without heart failure; AUC score = 7.    Medical History:  Stress test performed: No. CTA performed: No. Agatston accessed: Yes. Agatston Score: 144. LVEF Assessed: Yes. LVEF = 35%.    Procedure Description:  After infiltration with 2% Lidocaine, the right radial artery was cannulated with a modified Seldinger technique. Subsequently a 6 Wolof sheath was placed retrograde in the right radial artery. After infiltration of local anesthetic, the right internal jugular vein was cannulated with a micropuncture technique. A 9 sheath was placed in the vein. A balloon tipped catheter was advanced through the right heart to record pressures and balloon tipped catheter was positioned in the right heart system to record pressures and remained in the patient when transferred from the lab. Cardiac output was calculated via the Carloz method. After completion of the procedure, the arterial sheath was pulled and a TR Band Radial Compression Device was utilized to obtain patent hemostasis.    Coronary Angiography:  The coronary circulation is co-dominant.    Left Main Coronary Artery:  The left main coronary artery is a normal and a short caliber vessel. The left main arises normally from the left coronary sinus of Valsalva and bifurcates into the LAD and circumflex coronary arteries. The left main coronary artery showed a normal vessel.    Left Anterior Descending Coronary Artery Distribution:  The left anterior descending coronary artery is a normal caliber vessel. The LAD arises normally from the left main coronary artery. The LAD demonstrated mild irregularities and no significant disease or stenosis greater than 30%. The 1st diagonal branch is a normal caliber vessel. The 1st diagonal branch showed a normal vessel. The 2nd diagonal branch is a normal caliber vessel. The 2nd diagonal branch demonstrated a normal vessel.    Circumflex Coronary Artery Distribution:  The circumflex coronary artery is a normal  caliber vessel. The circumflex arises normally from the left main coronary artery and terminates in the AV groove. The circumflex revealed a normal vessel. The 1st obtuse marginal branch is a normal caliber vessel. The 1st obtuse marginal branch showed a normal vessel. The 2nd obtuse marginal branch is a medium-sized caliber vessel. The 2nd obtuse marginal branch demonstrated a normal vessel. The 3rd obtuse marginal branch is a medium-sized caliber vessel. The 3rd obtuse marginal branch revealed normal.    Ramus Intermedius:  The ramus intermedius is a medium-sized caliber vessel. The ramus intermedius showed a normal vessel.    Right Heart Catheterization:  A balloon tipped catheter was advanced through the right heart to record pressures and balloon tipped catheter was positioned in the right heart system to record pressures and remained in the patient when transferred from the lab. Cardiac output was calculated via the Carloz method. # Normal Cardiac output and index per Carloz method calculation  # Elevated left sided filling pressures with mean PCWP of 22 mmHG.  # Elevated right sided filling pressures with mean RA og 15 mmHg.  # Normal pulmonary artery pressure.    Right Coronary Artery Distribution:    The right coronary artery is a normal caliber vessel. The RCA arises normally from the right sinus of Valsalva. The RCA showed no significant disease or stenosis greater than 30% and mild irregularities. The mid right coronary artery showed 10 to 30% stenosis.    Coronary Interventions:    Coronary Lesion Summary:  Vessel      Stenosis      Vessel Segment  RCA    10 to 30% stenosis      mid      Hemo Personnel:  +----------------+------------+  Name            Duty          +----------------+------------+  Aly Paris MD, MD 1  +----------------+------------+  Nilam Yoder RNPDIANA NURSE 1  +----------------+------------+  Vijaya Echeverria RNPDIANA NURSE 2  +----------------+------------+    Follow-up  with [facility] team.    ____________________________________________________________________________________  CONCLUSIONS:  1. Mild non-obstructive coronary artery disease in a co-dominant system.  2. Normal Cardiac ouput and index.  3. Elevated filling pressures with mean PCWP of 22 mmHG and mean RA of 15 mmHg.    ____________________________________________________________________________________  CPT Codes:  Moderate Sedation Services initial 15 minutes patient >5 years-99234; Moderate Sedation Services 1st additional 15 minutes patient >5 years-80368; Moderate Sedation Services 2nd additional 15 minutes patient >5 years-89050; Coronary Angiography S&I only (RHC)([facility])-13627; Right Heart Cath O2/Cardiac output without biopsy ([facility])-80298    ICD 10 Codes:  I50.20-Unspecified systolic (congestive) heart failure    [id number] Aly Paris MD  Performing Physician  Electronically signed by [id number] Aly Paris MD on 11/27/2020 at 10:35:36 AM      cc Report to: x    cc Report to: [id number] Mirella Mathews MD    cc Report to: [id number] Rolo Bright MD      Right Heart Cath: No results found for this or any previous visit from the past 1800 days.    Cardiac Scoring:   CT HEART CALCIUM SCORING WO IV CONTRAST 11/19/2020    Narrative  MRN: [id number]  Patient Name: OSCAR ANSARI    STUDY:  CT CARDIAC SCORING;  11/19/2020 10:18 am    INDICATION:  CP, MCNAMARA.    COMPARISON:  None.    ACCESSION NUMBER(S):  [id number]    ORDERING CLINICIAN:  ROLO BRIGHT    TECHNIQUE:  Using prospective ECG gating, CT scan of the coronary arteries was  performed without intravenous contrast. Coronary calcium scoring  was  performed according to the method of Agatston.    FINDINGS:  The score and distribution of calcium in the coronary arteries is as  follows:    ,  LAD 0,  LCx 0,  RCA 0,    Total   144    The visualized mid/lower ascending thoracic aorta measures 4.4 cm in  diameter in the axial images at the level of the main pulmonary  artery. The heart  is normal in size. No pericardial effusion is  present. Main pulmonary is aneurysmal measuring 3.2 cm in diameter.    No gross evidence of mediastinal or hilar lymphadenopathy or masses  is identified. The visualized esophagus is unremarkable. A 8.7 x 4.2  oblong noncalcified nodule is noted in the right middle lobe (image  29 of 68).. No prior images are available for comparison. See  Fleischner criteria below for follow-up    The visualized segments of the lungs are normally expanded.    The visualized subdiaphragmatic structures appear intact. Small  hiatal hernia is noted.    Impression  1. Coronary artery calcium score of 144.  2. The mid ascending thoracic aorta is aneurysmal measuring 4.4 cm in  axial images at the level the main pulmonary artery. Would recommend  dedicated cardiac MRI to assess aortic valve and thoracic MRA in  12-18 months.  3. Main pulmonary artery is aneurysmal measuring 3.2 cm in diameter.  Correlate clinically for pulmonary hypertension.  4. Small hiatal hernia.  5. An 8.7 x 4.2 mm oblong noncalcified nodule is noted in the right  middle lobe. Please see Fleischner criteria below for follow-up    *Coronary Artery Calcium Gated and Nongated Agatston score  Score                                      Risk  0                                       Very low  1-99                                  Mildly increased  100-299                             Moderately increased  >300                                Moderate to severely increased    Yves et al. JCCT 2016 (http://dx.doi.org/10.1016/j.jcct.2016.11.003)    DUVAL 10-Year CHD Risk with Coronary Artery Calcification can be  calcuated using link below  [email or web address]://[email or web address]-[email or web address]  Clarence et al. JACC 2015 (http://dx.doi.org/10.1016/j.j  acc.2015.08.035)    *Follow-up recommendations according to the Fleischner Society  Criteria (Mike Salvador et al., Guidelines for management of  incidental pulmonary  nodules detected on CT images: From the  Fleischner Society 2017, DOI:  http://dx.doi.org/10.1148/radiol.8275059380.)  Dimensions are average of long and short axis, rounded to the nearest  millimeter. Consider all relevant risk factors.  SOLID NODULES:  SINGLE NODULE:  Low Risk:  < 6 mm No routine follow-up  6-8 mm CT at 6-12 months, then consider CT at 18-24 months  > 8 mm Consider CT at 3 months, PET/CT, or tissue sampling  Nodules <6 mm do not require routine follow-up, but certain patients  at high risk with suspicious nodule morphology, upper lobe location,  or both may warrant 12-month follow-up (recommendation 1A).  High risk:  < 6 mm Optional CT at 12 months  6-8 mm CT at 6-12 months, then CT at 18-24 months  > 8 mm Consider CT at 3 months, PET/CT, or tissue sampling  Nodules < 6 mm do not require routine follow-up, but certain patients  at high risk with suspicious nodule morphology, upper lobe location,  or both may warrant 12-month follow-up (recommendation 1A).    MULTIPLE NODULES:  Low risk:  < 6 mm No routine follow-up  6-8 mm CT at 3-6 months, then consider CT at 18-24 months  > 8 mm CT at 3-6 months, then consider CT at 18-24 months  Use most suspicious nodule as guide to management. Follow-up  intervals may vary according to size and risk (recommendation 2A).  High risk:  < 6 mm Optional CT at 12 months  6-8 mm CT at 3-6 months, then at 18-24 months  > 8 mm CT at 3-6 months, then at 18-24 months    Use most suspicious nodule as guide to management. Follow-up  intervals may vary according to size and risk (recommendation 2A).    Cardiac MRI: No results found for this or any previous visit from the past 1800 days.    Nuclear:No results found for this or any previous visit from the past 1800 days.    Metabolic Stress: No results found for this or any previous visit from the past 1800 days.      Imaging  No results found.    Cardiology, Vascular, and Other Imaging  No other imaging results found for  the past 7 days       LDA:   NUTRITION: Adult diet Regular  NPO Diet Except: Sips with meds; Effective midnight  EMERGENCY CONTACT: Extended Emergency Contact Information  Primary Emergency Contact: Ananya Ramirez  Address: [address]            [place] [id number] [facility]  Home Phone: [phone]  Mobile Phone: [phone]  Relation: Spouse  CODE STATUS: Full Code  DISPO:    AMPAC:      FOLLOWUP: No future appointments.             [1]   Medications Prior to Admission   Medication Sig Dispense Refill Last Dose/Taking    apixaban (Eliquis) 5 mg tablet Take 1 tablet (5 mg) by mouth 2 times a day. 180 tablet 3     atorvastatin (Lipitor) 10 mg tablet Take 1 tablet (10 mg) by mouth once daily. as directed 90 tablet 3     brimonidine (AlphaGAN) 0.2 % ophthalmic solution Administer 1 drop into the right eye 2 times a day. 15 mL 3     dapagliflozin propanediol (Farxiga) 10 mg tablet Take 1 tablet (10 mg) by mouth once daily. 30 tablet 11     digoxin (Lanoxin) 250 MCG tab;et Take 1 tablet (250 mcg) by mouth once daily. 90 tablet 3     Entresto  mg tablet Take 1 tablet by mouth 2 times a day. 60 tablet 11     furosemide (Lasix) 20 mg tablet TAKE 1 TABLET BY MOUTH ONCE DAILY (Patient taking differently: Take 1 tablet (20 mg) by mouth once daily as needed (for swelling/weight gain).) 270 tablet 0     methIMAzole (Tapazole) 5 mg tablet Take 1 tablet (5 mg) by mouth once daily. 30 tablet 11     metoprolol succinate XL (Toprol-XL) 100 mg 24 hr tablet Take 1 tablet (100 mg) by mouth 2 times a day. Do not crush or chew. 180 tablet 0     omeprazole (PriLOSEC) 40 mg DR capsule Take 1 capsule (40 mg) by mouth 2 times a day before meals for 14 days. Do not crush or chew. (Patient not taking: Reported on 5/13/2025) 28 capsule 0     omeprazole (PriLOSEC) 40 mg DR capsule Take 1 capsule (40 mg) by mouth once daily. Do not crush or chew. 90 capsule 0     timolol (Timoptic) 0.5 % ophthalmic solution USE 1  DROP IN THE RIGHT EYE EVERY MORNING. 55 mL 0     timolol (Timoptic) 0.5 % ophthalmic solution Administer 1 drop into the right eye once daily in the morning. 15 mL 3

## 2025-06-27 NOTE — DISCHARGE INSTRUCTIONS
Please call [phone] on Monday to schedule your hospital follow up appointment with Dr. Francisco in about 4 weeks.      IF YOU MISS A DOSE OF TIKOSYN (dofetilide), DO NOT TRY TO MAKE UP THE DOSE.  NEVER TAKE 2 DOSES AT THE SAME TIME.     *You should have bloodwork to check kidney function and electrolytes every 3-6 months with your primary provider or primary cardiologist while on Tikosyn. You will also need an EKG at least every 6 months while on Tikosyn.     *DO NOT take any herbs or supplements without first discussing with your healthcare provider or pharmacist. DO NOT eat grapefruit or drink grapefruit juice with Tikosyn (other citrus fruit is okay).  DO NOT break, chew, or open the capsules; they must be swallowed whole.     *You must tell any provider that treats you that you are on Tikosyn (due to potential for drug-drug interactions).  Keep an up-to-date medication list in your wallet (in addition to any list you keep in your phone).     * A website for lists of medications that can prolong the QT interval (what we measure on EKG during Tikosyn admission) is: [employer].org     * If you feel as though you are out of rhythm again, please call Dr. Yasmani Francisco's office at  or, if it's an emergency, please call 911 or have someone drive you to the nearest emergency room.     * Be careful not to run out of Tikosyn; get it refilled a week before you will run out. If you need more refills, please call Dr Yasmani Francisco's office or send them a message via Ryan.     * Continue to follow up with your primary cardiologist, primary care physician, and any other specialists you normally see.

## 2025-06-27 NOTE — HOSPITAL COURSE
Endy Nuñez is a 64 y.o. male with a PMHx of tachymediated caridomyopathy with improved EF, AF/AFL s/p PVI and RFA x2 (2020, 2022), untreated TENISHA, mild non obstructive CAD,  & amiodarone induced hyperthyroidism. Admitted for tikosyn loading given persistent symptomatic atypical atrial flutter. .     Electrophysiologist: Yasmani Francisco MD  Primary Cardiologist: Eliana Be MD     Arrhythmia History in brief  - s/p HASEEB w/DCCV 2020  with quick reversion to AFL. Dofetilide initiated. Dose limited to 125mcg due to prolong QT.  - Continued to have break through AF/AFL  - s/p  PVI and RFA of AFL 12/2020. EF subsequent improved to 45-50%.  - Recurrent AF 11/2022. Flecainide initiated. S/p successful DCCV 12/2022.  Flecainide switched to (?at some point) amiodarone.  - s/p PVI posterior and anterior wall scar modification RFA 3/2023. No recurrence on heart monitor. Amiodarone discontinued 10/2023.   - Recurrent symptomatic  AFL 4/2024 and amiodarone resumed. Later discontinued by endocrinology due to new hyperthyroidism. Started on methimazole.  - Rate controlled with digoxin 5/2025.     6/17 Dr. Be visit: AFL rates better controlled with addition of digoxin. No SOB. Had indiscretion with dietary and diuretic with travel resulting in edema. Reported decreased energy and exertional intolerance. EKG AFL 65bpm.      6/17 Dr. Yasmani Francisco visit. Pt will atypical flutter. Rate controlled on digoxin. Having signs of CHF. Discussed options of either observation vs restarting dofetilide with subsequent DCCV.    Floor Course  EP consulted, Tikosyn loaded at 250mcg q12hrs. Digoxin stopped. QTc remained WNL. Converted to SR 6/27 AM. Metoprolol held for asymptomatic bradycardia, HR 50s at rest, 60s on exertion. Plan to assess restarting as an outpatient.     90 day Tikosyn supply delivered via [facility] to [facility].     Discharge wt 80.8kg    After all labs and VS were reviewed the decision was made that the patient was medically stable  for discharge.  The patient was discharged in satisfactory condition.    More than 30 minutes were spent in coordinating patient discharge./

## 2025-06-27 NOTE — PROGRESS NOTES
06/27/25 1026   Discharge Planning   Living Arrangements Spouse/significant other   Support Systems Spouse/significant other   Assistance Needed None   Type of Residence Private residence   Number of Stairs to Enter Residence 2   Number of Stairs Within Residence 15   Do you have animals or pets at home? No   Who is requesting discharge planning? Provider   Home or Post Acute Services None   Expected Discharge Disposition Home   Does the patient need discharge transport arranged? No   Financial Resource Strain   How hard is it for you to pay for the very basics like food, housing, medical care, and heating? Not very   Housing Stability   In the last 12 months, was there a time when you were not able to pay the mortgage or rent on time? N   At any time in the past 12 months, were you homeless or living in a shelter (including now)? N   Transportation Needs   In the past 12 months, has lack of transportation kept you from medical appointments or from getting medications? no   In the past 12 months, has lack of transportation kept you from meetings, work, or from getting things needed for daily living? No   Patient Choice   Provider Choice list and CMS website (https://medicare.gov/care-compare#search) for post-acute Quality and Resource Measure Data were provided and reviewed with: Patient   Patient / Family choosing to utilize agency / facility established prior to hospitalization No   Stroke Family Assessment   Stroke Family Assessment Needed No   Intensity of Service   Intensity of Service 0-30 min     Transitional Care Coordination Progress Note:  Patient discussed during interdisciplinary rounds.   Team members present: RN TCC MD   Plan per Medical/Surgical team: tikosyn loading.    Payor:  [employer]   Discharge disposition: Home   Potential Barriers: None   ADOD: 6-      Previous Home Care: None   DME: None  Pharmacy:  [facility]   Falls: None   PCP:  NAINA TOUSSAINT   Dialysis: None

## 2025-06-27 NOTE — CARE PLAN
The patient's goals for the shift include      The clinical goals for the shift include Pt will remain free from injury    Over the shift, the patient admitted and assessment completed. First dose of Tikosyn given. EKG completed.       Problem: Pain - Adult  Goal: Verbalizes/displays adequate comfort level or baseline comfort level  Outcome: Progressing     Problem: Safety - Adult  Goal: Free from fall injury  Outcome: Progressing     Problem: Discharge Planning  Goal: Discharge to home or other facility with appropriate resources  Outcome: Progressing     Problem: Chronic Conditions and Co-morbidities  Goal: Patient's chronic conditions and co-morbidity symptoms are monitored and maintained or improved  Outcome: Progressing     Problem: Nutrition  Goal: Nutrient intake appropriate for maintaining nutritional needs  Outcome: Progressing     Problem: Heart Failure  Goal: Improved gas exchange this shift  Outcome: Progressing  Goal: Improved urinary output this shift  Outcome: Progressing  Goal: Reduction in peripheral edema within 24 hours  Outcome: Progressing  Goal: Report improvement of dyspnea/breathlessness this shift  Outcome: Progressing  Goal: Weight from fluid excess reduced over 2-3 days, then stabilize  Outcome: Progressing  Goal: Increase self care and/or family involvement in 24 hours  Outcome: Progressing

## 2025-06-27 NOTE — CONSULTS
Inpatient consult to Electrophysiology  Consult performed by: Joellen Henry MD  Consult ordered by: Tez Nicholas MD        Inpatient Cardiology Consult Note    Reason for consult: A flutter    HPI:   Endy Nuñez is a 64 y.o. male with a PMHx of tachymediated caridomyopathy with improved EF, AF/AFL s/p PVI and RFA x2 (2020, 2022), untreated TENISHA, mild non obstructive CAD,  & amiodarone induced hyperthyroidism. Admitted for tikosyn loading.     Electrophysiologist: Yasmani Francisco MD  Primary Cardiologist: Eliana Be MD     Arrhythmia History in brief  - s/p HASEEB w/DCCV 2020  with quick reversion to AFL. Dofetilide initiated. Dose limited to 125mcg due to prolong QT.  - Continued to have break through AF/AFL  - s/p  PVI and RFA of AFL 12/2020. EF subsequent improved to 45-50%.  - Recurrent AF 11/2022. Flecainide initiated. S/p successful DCCV 12/2022.  Flecainide switched to (?at some point) amiodarone.  - s/p PVI posterior and anterior wall scar modification RFA 3/2023. No recurrence on heart monitor. Amiodarone discontinued 10/2023.   - Recurrent symptomatic  AFL 4/2024 and amiodarone resumed. Later discontinued by endocrinology due to new hyperthyroidism. Started on methimazole.  - Rate controlled with digoxin 5/2025.     6/17 Dr. Be visit: AFL rates better controlled with addition of digoxin. No SOB. Had indiscretion with dietary and diuretic with travel resulting in edema. Reported decreased energy and exertional intolerance. EKG AFL 65bpm.      6/17 Dr. Yasmani Francisco visit. Pt will atypical flutter. Rate controlled on digoxin. Having signs of CHF. Discussed options of either observation vs restarting dofetilide with subsequent DCCV.     Today, he tells me he feels dyspnea and fatigue on exertion with his arrhythmia ever since it began. Denies illnesses in last couple weeks. No chest pain, resting SOB, lightheadedness or dizziness. Less palpitations / rapid heart beats with taking digoxin. Leg  swelling and weight improving near baseline since taking lasix daily per Dr. Naylor recommendations last visit. No orthopnea. No missed doses of eliquis in the last 4wks.     Spoke with patient this a.m., he denies any chest pain, shortness of breath, palpitations, lightheadedness, dizziness, or fatigue.  He states that over the past month, he has been feeling worsening shortness of breath, that is now improved.  He otherwise did not feel palpitations, he converted to normal sinus rhythm overnight after the first dose of dofetilide.  All system reviewed and negative unless mentioned above.     Cardiac studies:   EKG 6/27/2025L NSR, Qtc 426     Echo: 5/6/2025    1. Left ventricular ejection fraction is low normal, calculated by Pederson's biplane at 51%.   2. Left ventricular diastolic filling is indeterminate due to atrial fibrillation/flutter.   3. There is reduced right ventricular systolic function.   4. The left atrium is mildly dilated.   5. The right atrium is moderately dilated.   6. The patient is in atrial fibrillation which may influence the estimate of left ventricular function and transvalvular flows.      Current Medications[1]    Objective:    Vitals:    06/27/25 0815   BP: 109/74   Pulse: 57   Resp: 18   Temp: 36 °C (96.8 °F)   SpO2: 97%       Exam:  GEN: NAD  HEENT: ATNC,  anicteric, no JVD  CV: RRR, no m/r/g  Pulm: CTAB  Abdomen: NTND  Ext: warm, no LE edema noted  Neuro: A+Ox3  Psych: appropriate      Labs/Imaging:     Results from last 72 hours   Lab Units 06/27/25  0513   SODIUM mmol/L 138   POTASSIUM mmol/L 3.7   CO2 mmol/L 24   BUN mg/dL 18   CREATININE mg/dL 0.86   MAGNESIUM mg/dL 2.05   PHOSPHORUS mg/dL 3.9      Results from last 72 hours   Lab Units 06/27/25  0513   WBC AUTO x10*3/uL 4.2*   HEMOGLOBIN g/dL 12.2*   PLATELETS AUTO x10*3/uL 138*        Assessment and Plan:   Endy Nuñez is a 64 y.o. male with a PMHx of tachymediated caridomyopathy with improved EF, AF/AFL s/p PVI and RFA x2  (2020, 2022), untreated TENISHA, mild non obstructive CAD,  & amiodarone induced hyperthyroidism. Admitted for tikosyn loading.    #AF/AFL s/p PVI and RFA x2 (2020, 2022)  #Atypical flutter  -Tikosyn 250 mcg BID, 1st dose, 6/26 9 PM. Converted to NSR with 1st dose. (6 inpatient monitored doses)   -Stop digoxin.   -Decrease metop to 75 BID.   -Keep K>4, Mg>2   -EKG 2 hours after each dose. Stop tikosyn if Qtc increases by >15%.   -Instructed patient on tikosyn use. See DC instructions below.    -Last monitored dose would be 6/29 AM if loading is successful.     ---------------------------------------------------------------------------------------------  IF YOU MISS A DOSE OF TIKOSYN (dofetilide), DO NOT TRY TO MAKE UP THE DOSE.  NEVER TAKE 2 DOSES AT THE SAME TIME.    *You should have bloodwork to check kidney function and electrolytes every 3-6 months with your primary provider or primary cardiologist while on Tikosyn. You will also need an EKG at least every 6 months while on Tikosyn.    *DO NOT take any herbs or supplements without first discussing with your healthcare provider or pharmacist. DO NOT eat grapefruit or drink grapefruit juice with Tikosyn (other citrus fruit is okay).  DO NOT break, chew, or open the capsules; they must be swallowed whole.    *You must tell any provider that treats you that you are on Tikosyn (due to potential for drug-drug interactions).  Keep an up-to-date medication list in your wallet (in addition to any list you keep in your phone).    * A website for lists of medications that can prolong the QT interval (what we measure on EKG during Tikosyn admission) is: [employer]org    * If you feel as though you are out of rhythm again, please call Dr. Yasmani Francisco's office at  or, if it's an emergency, please call 911 or have someone drive you to the nearest emergency room.    * Be careful not to run out of Tikosyn; get it refilled a week before you will run out. If you need more refills,  please call Dr Yasmani Francisco's office or send them a message via [email or web address].    * Continue to follow up with your primary cardiologist, primary care physician, and any other specialists you normally see.    Thank you for this consult. Recommendations are preliminary until note is co-signed by an attending.     Joellen Henry  Cardiology Fellow   PGY5     Joellen Henry MD         [1]   Current Facility-Administered Medications:     acetaminophen (Tylenol) tablet 650 mg, 650 mg, oral, q4h PRN **OR** acetaminophen (Tylenol) oral liquid 650 mg, 650 mg, oral, q4h PRN **OR** acetaminophen (Tylenol) suppository 650 mg, 650 mg, rectal, q4h PRN, Gavi Shahid PA-C    apixaban (Eliquis) tablet 5 mg, 5 mg, oral, BID, Gaviharsh Shahid PA-C, 5 mg at 06/27/25 0916    atorvastatin (Lipitor) tablet 10 mg, 10 mg, oral, Daily, Gavijodie Shahid PA-C, 10 mg at 06/26/25 2102    brimonidine (AlphaGAN) 0.2 % ophthalmic solution 1 drop, 1 drop, Right Eye, BID, Gaviharsh Shahid PA-C, 1 drop at 06/27/25 0918    dapagliflozin propanediol (Farxiga) tablet 10 mg, 10 mg, oral, Daily, [facility] SHANTE Ramesh-JAZMIN, 10 mg at 06/27/25 0916    [Held by provider] digoxin (Lanoxin) tablet 250 mcg, 250 mcg, oral, Daily, Gavi Shahid PA-C    dofetilide (Tikosyn) capsule 250 mcg, 250 mcg, oral, q12h DIANDRA, Bryan Guerrero MD, 250 mcg at 06/27/25 0916    furosemide (Lasix) tablet 20 mg, 20 mg, oral, Daily, Gavi Shahid PA-C    melatonin tablet 3 mg, 3 mg, oral, Nightly PRN, Gavi Shahid PA-C    methIMAzole (Tapazole) tablet 5 mg, 5 mg, oral, Daily, Gavi Shahid PA-C, 5 mg at 06/27/25 0918    metoprolol succinate XL (Toprol-XL) 24 hr tablet 100 mg, 100 mg, oral, BID, Gavi Shahid PA-C, 100 mg at 06/27/25 0916    polyethylene glycol (Glycolax, Miralax) packet 17 g, 17 g, oral, Daily PRN, Gavi Shahid PA-C    sacubitriL-valsartan (Entresto)  mg per tablet 1 tablet, 1 tablet, oral, BID, Gavi APONTE  GINNA Shahid, 1 tablet at 06/27/25 0916

## 2025-06-27 NOTE — CARE PLAN
The clinical goals for the shift include Pt VS will remain WNL    Problem: Pain - Adult  Goal: Verbalizes/displays adequate comfort level or baseline comfort level  Outcome: Progressing     Problem: Safety - Adult  Goal: Free from fall injury  Outcome: Progressing     Problem: Discharge Planning  Goal: Discharge to home or other facility with appropriate resources  Outcome: Progressing     Problem: Chronic Conditions and Co-morbidities  Goal: Patient's chronic conditions and co-morbidity symptoms are monitored and maintained or improved  Outcome: Progressing     Problem: Nutrition  Goal: Nutrient intake appropriate for maintaining nutritional needs  Outcome: Progressing     Problem: Heart Failure  Goal: Improved gas exchange this shift  Outcome: Progressing  Goal: Improved urinary output this shift  Outcome: Progressing  Goal: Reduction in peripheral edema within 24 hours  Outcome: Progressing  Goal: Report improvement of dyspnea/breathlessness this shift  Outcome: Progressing  Goal: Weight from fluid excess reduced over 2-3 days, then stabilize  Outcome: Progressing  Goal: Increase self care and/or family involvement in 24 hours  Outcome: Progressing

## 2025-06-27 NOTE — PROGRESS NOTES
Subjective:  Feels fine. No sudden change in symptoms overnight when he transitioned into PAFL    Today in brief:  - EKG intervals stable after dose #1 tikosyn  - Transitioned in to PAFL overnight. Mainly NSR this morning.  - Dose #2 of tikosyn given this morning with stable EKG 2 hrs post. May proceed with dose #3 this evening (pending formal EP recs)  - Replete potassium    Objective:  Vitals:    06/27/25 0410   BP: 105/70   Pulse: 59   Resp: 18   Temp: 36.2 °C (97.2 °F)   SpO2: 97%     Weight         6/26/2025  1845 6/27/2025  0410          Weight: 81.2 kg (179 lb 0.2 oz) 81.6 kg (179 lb 14.3 oz)              Intake/Output Summary (Last 24 hours) at 6/27/2025 0720  Last data filed at 6/27/2025 0515  Gross per 24 hour   Intake --   Output 1225 ml   Net -1225 ml     Recent Results (from the past 24 hours)   Comprehensive metabolic panel    Collection Time: 06/26/25  7:02 PM   Result Value Ref Range    Glucose 104 (H) 74 - 99 mg/dL    Sodium 138 136 - 145 mmol/L    Potassium 4.6 3.5 - 5.3 mmol/L    Chloride 106 98 - 107 mmol/L    Bicarbonate 24 21 - 32 mmol/L    Anion Gap 13 10 - 20 mmol/L    Urea Nitrogen 23 6 - 23 mg/dL    Creatinine 1.10 0.50 - 1.30 mg/dL    eGFR 74 >60 mL/min/1.73m*2    Calcium 9.2 8.6 - 10.6 mg/dL    Albumin 4.4 3.4 - 5.0 g/dL    Alkaline Phosphatase 60 33 - 136 U/L    Total Protein 6.8 6.4 - 8.2 g/dL    AST 17 9 - 39 U/L    Bilirubin, Total 0.4 0.0 - 1.2 mg/dL    ALT 12 10 - 52 U/L   Magnesium    Collection Time: 06/26/25  7:02 PM   Result Value Ref Range    Magnesium 2.23 1.60 - 2.40 mg/dL   CBC    Collection Time: 06/26/25  7:02 PM   Result Value Ref Range    WBC 6.3 4.4 - 11.3 x10*3/uL    nRBC 0.0 0.0 - 0.0 /100 WBCs    RBC 4.78 4.50 - 5.90 x10*6/uL    Hemoglobin 14.0 13.5 - 17.5 g/dL    Hematocrit 42.7 41.0 - 52.0 %    MCV 89 80 - 100 fL    MCH 29.3 26.0 - 34.0 pg    MCHC 32.8 32.0 - 36.0 g/dL    RDW 13.3 11.5 - 14.5 %    Platelets 194 150 - 450 x10*3/uL   B-type natriuretic peptide     Collection Time: 06/26/25  7:02 PM   Result Value Ref Range     (H) 0 - 99 pg/mL   TSH with reflex to Free T4 if abnormal    Collection Time: 06/26/25  7:02 PM   Result Value Ref Range    Thyroid Stimulating Hormone 6.27 (H) 0.44 - 3.98 mIU/L   Thyroxine, Free    Collection Time: 06/26/25  7:02 PM   Result Value Ref Range    Thyroxine, Free 1.27 0.78 - 1.48 ng/dL   Light Blue Top    Collection Time: 06/26/25  9:01 PM   Result Value Ref Range    Extra Tube Hold for add-ons.    CBC    Collection Time: 06/27/25  5:13 AM   Result Value Ref Range    WBC 4.2 (L) 4.4 - 11.3 x10*3/uL    nRBC 0.0 0.0 - 0.0 /100 WBCs    RBC 4.24 (L) 4.50 - 5.90 x10*6/uL    Hemoglobin 12.2 (L) 13.5 - 17.5 g/dL    Hematocrit 38.4 (L) 41.0 - 52.0 %    MCV 91 80 - 100 fL    MCH 28.8 26.0 - 34.0 pg    MCHC 31.8 (L) 32.0 - 36.0 g/dL    RDW 13.4 11.5 - 14.5 %    Platelets 138 (L) 150 - 450 x10*3/uL   Comprehensive metabolic panel    Collection Time: 06/27/25  5:13 AM   Result Value Ref Range    Glucose 109 (H) 74 - 99 mg/dL    Sodium 138 136 - 145 mmol/L    Potassium 3.7 3.5 - 5.3 mmol/L    Chloride 109 (H) 98 - 107 mmol/L    Bicarbonate 24 21 - 32 mmol/L    Anion Gap 9 (L) 10 - 20 mmol/L    Urea Nitrogen 18 6 - 23 mg/dL    Creatinine 0.86 0.50 - 1.30 mg/dL    eGFR >90 >60 mL/min/1.73m*2    Calcium 8.6 8.6 - 10.6 mg/dL    Albumin 3.6 3.4 - 5.0 g/dL    Alkaline Phosphatase 48 33 - 136 U/L    Total Protein 5.9 (L) 6.4 - 8.2 g/dL    AST 14 9 - 39 U/L    Bilirubin, Total 0.4 0.0 - 1.2 mg/dL    ALT 10 10 - 52 U/L   Magnesium    Collection Time: 06/27/25  5:13 AM   Result Value Ref Range    Magnesium 2.05 1.60 - 2.40 mg/dL   Phosphorus    Collection Time: 06/27/25  5:13 AM   Result Value Ref Range    Phosphorus 3.9 2.5 - 4.9 mg/dL     Inpatient Medications:  Scheduled Medications[1]  PRN Medications[2]  Continuous Medications[3]    Telemetry 6/27/2025 (personally reviewed): AFL transition to non sustained PAFL ~0000. Overall in NSR 50-60s since 2am  with Pacs.    Physical exam:  General: NAD  Head/ neck: no JVD  Cardiac: RRR, regular S1 S2 , no murmur, no rub, no gallop  Pulm: CTA bilaterally, no wheezes, rales or rhonchi.    Vascular: Radial 2+ bilaterally  GI: soft, non distended  Extremities: trace, non pitting bilateral lower extremity edema   Neuro: no focal neuro deficits   Psych: appropriate mood and behavior   Skin: warm and dry        Assessment/Plan   Endy Nuñez is a 64 y.o. male with a PMHx of tachymediated caridomyopathy with normalized EF, AF/AFL s/p PVI and RFA x2 (2020, 2022), untreated TENISHA, mild non-obstructive CAD, & amiodarone induced hyperthyroidism. Admitted for tikosyn loading given persistent symptomatic atypical atrial flutter.     Recurrent, symptomatic atrial fibrillation & atypical atrial flutter  S/p PVI and RF ablation 2020, redo PVI with scar modification 3/2023  Arrhythmia History in brief:  - s/p HASEEB w/DCCV 2020  with quick reversion to AFL. Dofetilide initiated. Dose limited to 125mcg due to prolong QT.  - Continued to have break through AF/AFL  - s/p  PVI and RFA of AFL 12/2020. EF subsequent improved to 45-50%.  - Recurrent AF 11/2022. Flecainide initiated. S/p successful DCCV 12/2022.  Flecainide switched to (?at some point) amiodarone.  - s/p PVI posterior and anterior wall scar modification RFA 3/2023. No recurrence on heart monitor. Amiodarone discontinued 10/2023.   - Recurrent symptomatic  AFL 4/2024 and amiodarone resumed. Later discontinued by endocrinology due to new hyperthyroidism. Started on methimazole.  - Rate controlled with digoxin 5/2025.     - Prior to admit: Some symptomatic improvement noted with digoxin but persistent dyspnea and fatigue on exertion persist.   - Admit EKG 6/26: AFL with variable block 87bpm  - EP following  - Tikosyn initiated 6/26 at 2100. At 250 mcg q12hrs with EKG 2hrs post administration for QT evaluation pending initial EKG intervals  - converted to NSR early AM hours of 6/27. NSR  60bpm. QTC (Bazet) ~440 WNL. Similar to prior EKG of NSR from 2/27/24 with NSTWI leads II, AVF, V1  - Replete K to keep >4.0. Given Kcl 20mEq x1 (given prior to dose #2)  - Mg WNL  - Tikosyn dose #2 given with stable EKG 6/26 @1127: SR 61, x1 PAC, manual Qtc~444 within acceptable limits.  - May proceed with dose #3 this evening (pending formal EP recs)  - continue uninterrupted eliquis for thromboembolism protection    Tachy-mediated cardiomyopathy 35%> 50%  - TTE 5/13/25: EF 51%, in AF/AFL. Mildly dilated LA, moderately dilated RA.   - NP minimally elevated but he appears volume compensated on daily low dose lasix.   - GDMT: metoprolol, entresto, farxiga     Untreated TENISHA  - Recommend outpt evaluation     Mild Non obstructive CAD  - [facility] 2020: Co dominant system. Mild non obstructive dz (mRCA 10-30%)  - Eliquis in place of aspirin, statin     Hyperthyroidism   - 2/2 amiodarone use  - 6/26 TSH H but reflex T4 WNL  - Methimazole     DVT ppx: eliquis    DISPO: Pending arrhythmia management    Code status: Full Code    Patient seen and discussed with Dr. Tez Nicholas.  Gavi Shahid PA-C         [1]   Scheduled medications   Medication Dose Route Frequency    apixaban  5 mg oral BID    atorvastatin  10 mg oral Daily    brimonidine  1 drop Right Eye BID    dapagliflozin propanediol  10 mg oral Daily    [Held by provider] digoxin  250 mcg oral Daily    dofetilide  250 mcg oral q12h DIANDRA    [Held by provider] furosemide  20 mg oral Daily    methIMAzole  5 mg oral Daily    metoprolol succinate XL  100 mg oral BID    sacubitriL-valsartan  1 tablet oral BID   [2]   PRN medications   Medication    acetaminophen    Or    acetaminophen    Or    acetaminophen    melatonin    polyethylene glycol   [3]   Continuous Medications   Medication Dose Last Rate

## 2025-06-28 ENCOUNTER — APPOINTMENT (OUTPATIENT)
Dept: CARDIOLOGY | Facility: HOSPITAL | Age: 65
DRG: 310 | End: 2025-06-28
Payer: COMMERCIAL

## 2025-06-28 LAB
ALBUMIN SERPL BCP-MCNC: 3.9 G/DL (ref 3.4–5)
ALBUMIN SERPL BCP-MCNC: 4.1 G/DL (ref 3.4–5)
ANION GAP SERPL CALC-SCNC: 11 MMOL/L (ref 10–20)
ANION GAP SERPL CALC-SCNC: 12 MMOL/L (ref 10–20)
BUN SERPL-MCNC: 17 MG/DL (ref 6–23)
BUN SERPL-MCNC: 21 MG/DL (ref 6–23)
CALCIUM SERPL-MCNC: 8.9 MG/DL (ref 8.6–10.6)
CALCIUM SERPL-MCNC: 9.2 MG/DL (ref 8.6–10.6)
CHLORIDE SERPL-SCNC: 105 MMOL/L (ref 98–107)
CHLORIDE SERPL-SCNC: 107 MMOL/L (ref 98–107)
CO2 SERPL-SCNC: 24 MMOL/L (ref 21–32)
CO2 SERPL-SCNC: 25 MMOL/L (ref 21–32)
CREAT SERPL-MCNC: 0.86 MG/DL (ref 0.5–1.3)
CREAT SERPL-MCNC: 1.01 MG/DL (ref 0.5–1.3)
EGFRCR SERPLBLD CKD-EPI 2021: 82 ML/MIN/1.73M*2
EGFRCR SERPLBLD CKD-EPI 2021: >90 ML/MIN/1.73M*2
ERYTHROCYTE [DISTWIDTH] IN BLOOD BY AUTOMATED COUNT: 13.3 % (ref 11.5–14.5)
ERYTHROCYTE [DISTWIDTH] IN BLOOD BY AUTOMATED COUNT: 13.6 % (ref 11.5–14.5)
GLUCOSE SERPL-MCNC: 101 MG/DL (ref 74–99)
GLUCOSE SERPL-MCNC: 91 MG/DL (ref 74–99)
HCT VFR BLD AUTO: 41.2 % (ref 41–52)
HCT VFR BLD AUTO: 44 % (ref 41–52)
HGB BLD-MCNC: 13.2 G/DL (ref 13.5–17.5)
HGB BLD-MCNC: 13.9 G/DL (ref 13.5–17.5)
MAGNESIUM SERPL-MCNC: 2.12 MG/DL (ref 1.6–2.4)
MAGNESIUM SERPL-MCNC: 2.2 MG/DL (ref 1.6–2.4)
MCH RBC QN AUTO: 28.6 PG (ref 26–34)
MCH RBC QN AUTO: 28.8 PG (ref 26–34)
MCHC RBC AUTO-ENTMCNC: 31.6 G/DL (ref 32–36)
MCHC RBC AUTO-ENTMCNC: 32 G/DL (ref 32–36)
MCV RBC AUTO: 89 FL (ref 80–100)
MCV RBC AUTO: 91 FL (ref 80–100)
NRBC BLD-RTO: 0 /100 WBCS (ref 0–0)
NRBC BLD-RTO: 0 /100 WBCS (ref 0–0)
PHOSPHATE SERPL-MCNC: 4.4 MG/DL (ref 2.5–4.9)
PHOSPHATE SERPL-MCNC: 4.5 MG/DL (ref 2.5–4.9)
PLATELET # BLD AUTO: 152 X10*3/UL (ref 150–450)
PLATELET # BLD AUTO: 175 X10*3/UL (ref 150–450)
POTASSIUM SERPL-SCNC: 4 MMOL/L (ref 3.5–5.3)
POTASSIUM SERPL-SCNC: 4.1 MMOL/L (ref 3.5–5.3)
RBC # BLD AUTO: 4.61 X10*6/UL (ref 4.5–5.9)
RBC # BLD AUTO: 4.83 X10*6/UL (ref 4.5–5.9)
SODIUM SERPL-SCNC: 137 MMOL/L (ref 136–145)
SODIUM SERPL-SCNC: 139 MMOL/L (ref 136–145)
WBC # BLD AUTO: 5.4 X10*3/UL (ref 4.4–11.3)
WBC # BLD AUTO: 6.3 X10*3/UL (ref 4.4–11.3)

## 2025-06-28 PROCEDURE — 2500000005 HC RX 250 GENERAL PHARMACY W/O HCPCS: Performed by: NURSE PRACTITIONER

## 2025-06-28 PROCEDURE — RXMED WILLOW AMBULATORY MEDICATION CHARGE

## 2025-06-28 PROCEDURE — 80069 RENAL FUNCTION PANEL: CPT | Performed by: PHYSICIAN ASSISTANT

## 2025-06-28 PROCEDURE — 99232 SBSQ HOSP IP/OBS MODERATE 35: CPT | Performed by: STUDENT IN AN ORGANIZED HEALTH CARE EDUCATION/TRAINING PROGRAM

## 2025-06-28 PROCEDURE — 93005 ELECTROCARDIOGRAM TRACING: CPT

## 2025-06-28 PROCEDURE — 83735 ASSAY OF MAGNESIUM: CPT

## 2025-06-28 PROCEDURE — 1200000002 HC GENERAL ROOM WITH TELEMETRY DAILY

## 2025-06-28 PROCEDURE — 2500000001 HC RX 250 WO HCPCS SELF ADMINISTERED DRUGS (ALT 637 FOR MEDICARE OP): Performed by: PHYSICIAN ASSISTANT

## 2025-06-28 PROCEDURE — 85027 COMPLETE CBC AUTOMATED: CPT | Performed by: PHYSICIAN ASSISTANT

## 2025-06-28 PROCEDURE — 93010 ELECTROCARDIOGRAM REPORT: CPT | Performed by: INTERNAL MEDICINE

## 2025-06-28 PROCEDURE — 99233 SBSQ HOSP IP/OBS HIGH 50: CPT | Performed by: STUDENT IN AN ORGANIZED HEALTH CARE EDUCATION/TRAINING PROGRAM

## 2025-06-28 PROCEDURE — 2500000001 HC RX 250 WO HCPCS SELF ADMINISTERED DRUGS (ALT 637 FOR MEDICARE OP)

## 2025-06-28 PROCEDURE — 83735 ASSAY OF MAGNESIUM: CPT | Performed by: NURSE PRACTITIONER

## 2025-06-28 PROCEDURE — 85027 COMPLETE CBC AUTOMATED: CPT

## 2025-06-28 PROCEDURE — 36415 COLL VENOUS BLD VENIPUNCTURE: CPT

## 2025-06-28 PROCEDURE — 36415 COLL VENOUS BLD VENIPUNCTURE: CPT | Performed by: PHYSICIAN ASSISTANT

## 2025-06-28 PROCEDURE — 80069 RENAL FUNCTION PANEL: CPT

## 2025-06-28 RX ORDER — TIMOLOL MALEATE 5 MG/ML
1 SOLUTION/ DROPS OPHTHALMIC DAILY
Status: DISCONTINUED | OUTPATIENT
Start: 2025-06-28 | End: 2025-06-29 | Stop reason: HOSPADM

## 2025-06-28 RX ADMIN — DOFETILIDE 250 MCG: 0.25 CAPSULE ORAL at 09:28

## 2025-06-28 RX ADMIN — SACUBITRIL AND VALSARTAN 1 TABLET: 97; 103 TABLET, FILM COATED ORAL at 21:14

## 2025-06-28 RX ADMIN — TIMOLOL MALEATE 1 DROP: 5 SOLUTION/ DROPS OPHTHALMIC at 12:12

## 2025-06-28 RX ADMIN — SACUBITRIL AND VALSARTAN 1 TABLET: 97; 103 TABLET, FILM COATED ORAL at 09:28

## 2025-06-28 RX ADMIN — METHIMAZOLE 5 MG: 5 TABLET ORAL at 09:27

## 2025-06-28 RX ADMIN — BRIMONIDINE TARTRATE 1 DROP: 2 SOLUTION/ DROPS OPHTHALMIC at 09:27

## 2025-06-28 RX ADMIN — APIXABAN 5 MG: 5 TABLET, FILM COATED ORAL at 09:27

## 2025-06-28 RX ADMIN — FUROSEMIDE 20 MG: 20 TABLET ORAL at 09:27

## 2025-06-28 RX ADMIN — DAPAGLIFLOZIN 10 MG: 10 TABLET, FILM COATED ORAL at 09:28

## 2025-06-28 RX ADMIN — ATORVASTATIN CALCIUM 10 MG: 10 TABLET, FILM COATED ORAL at 09:27

## 2025-06-28 RX ADMIN — APIXABAN 5 MG: 5 TABLET, FILM COATED ORAL at 21:14

## 2025-06-28 RX ADMIN — DOFETILIDE 250 MCG: 0.25 CAPSULE ORAL at 21:14

## 2025-06-28 RX ADMIN — BRIMONIDINE TARTRATE 1 DROP: 2 SOLUTION/ DROPS OPHTHALMIC at 21:18

## 2025-06-28 ASSESSMENT — COGNITIVE AND FUNCTIONAL STATUS - GENERAL
DAILY ACTIVITIY SCORE: 24
MOBILITY SCORE: 24
MOBILITY SCORE: 24
DAILY ACTIVITIY SCORE: 24

## 2025-06-28 ASSESSMENT — PAIN SCALES - GENERAL
PAINLEVEL_OUTOF10: 0 - NO PAIN
PAINLEVEL_OUTOF10: 0 - NO PAIN

## 2025-06-28 ASSESSMENT — PAIN - FUNCTIONAL ASSESSMENT: PAIN_FUNCTIONAL_ASSESSMENT: 0-10

## 2025-06-28 NOTE — PROGRESS NOTES
Subjective Data:  Tolerating dofetilide, now s/p 4 doses. QTc this morning 441. Short run of aflutter this morning resolved spontaneously. Bradycardic to 40s overnight, asymptomatic. Holding Toprol XL for now, likely restart tomorrow at lower dose.      Objective Data:  Last Recorded Vitals:  Vitals:    06/27/25 2330 06/28/25 0425 06/28/25 0805 06/28/25 1222   BP: 94/58 95/55 106/72 103/66   BP Location: Left arm Left arm Left arm Left arm   Patient Position: Sitting Sitting Sitting Sitting   Pulse: 52 54 53 54   Resp: 16 18 18 18   Temp: 35.7 °C (96.3 °F) 36 °C (96.8 °F) 36.5 °C (97.7 °F) 36.2 °C (97.2 °F)   TempSrc: Temporal Temporal Temporal Temporal   SpO2: 98% 98% 97% 96%   Weight:  80.3 kg (177 lb 0.5 oz)     Height:           Last Labs:  CBC - 6/28/2025:  4:40 AM  5.4 13.2 152    41.2      CMP - 6/28/2025:  4:40 AM  8.9 5.9 14 --- 0.4   4.5 3.9 10 48      PTT - No results in last year.  _   _ _     BNP   Date/Time Value Ref Range Status   06/26/2025 07:02  0 - 99 pg/mL Final   05/12/2025 07:59  <100 pg/mL Final     Comment:        BNP levels increase with age in the general  population with the highest values seen in  individuals greater than 75 years of age.  Reference: J. Am. Guillermina. Cardiol. 2002; 40:976-982.        02/27/2024 10:38  0 - 99 pg/mL Final     LDLCALC   Date/Time Value Ref Range Status   05/20/2024 08:10 AM 74 <=99 mg/dL Final     Comment:                                 Near   Borderline      AGE      Desirable  Optimal    High     High     Very High     0-19 Y     0 - 109     ---    110-129   >/= 130     ----    20-24 Y     0 - 119     ---    120-159   >/= 160     ----      >24 Y     0 -  99   100-129  130-159   160-189     >/=190       VLDL   Date/Time Value Ref Range Status   05/20/2024 08:10 AM 13 0 - 40 mg/dL Final   09/25/2023 09:03 AM 32 0 - 40 mg/dL Final   11/23/2020 10:16 AM 15 0 - 40 mg/dL Final   11/16/2020 05:00 PM 18 0 - 40 mg/dL Final      Last I/O:  I/O last 3  completed shifts:  In: - (0 mL/kg)   Out: 3825 (47.6 mL/kg) [Urine:3825 (1.3 mL/kg/hr)]  Weight: 80.3 kg     Past Cardiology Tests (Last 3 Years):  EKG:  ECG 12 Lead 06/19/2025      ECG 12 lead (Clinic Performed) 05/13/2025      ECG 12 lead (Clinic Performed) 05/24/2024      ECG 12 Lead 04/26/2024      ECG 12 lead (Clinic Performed) 02/27/2024      ECG 12 lead (Clinic Performed) 10/27/2023    Echo:  Transthoracic echo (TTE) complete 05/13/2025      Transthoracic echo (TTE) complete 02/27/2024    Ejection Fractions:  EF   Date/Time Value Ref Range Status   05/13/2025 10:55 AM 51 %    02/27/2024 09:43 AM 46 %      Cath:  No results found for this or any previous visit from the past 1095 days.    Stress Test:  No results found for this or any previous visit from the past 1095 days.    Cardiac Imaging:  No results found for this or any previous visit from the past 1095 days.      Inpatient Medications:  Scheduled Medications[1]  PRN Medications[2]  Continuous Medications[3]    Physical Exam:  General: NAD, ambulating in hallway  Skin: warm and dry   Head/neck: unable to appreciate JVD at 90 degrees  Cardiac: S1S2, bradycardic  Pulm: CTA  GI: soft, nontender   Extremities: no LE edema   Neuro: A/Ox3, no focal neuro deficits   Psych: appropriate mood and behavior, very pleasant        Assessment/Plan   Endy Nuñez is a 64 y.o. male with a PMHx of tachymediated caridomyopathy with normalized EF, AF/AFL s/p PVI and RFA x2 (2020, 2022), untreated TENISHA, mild non-obstructive CAD, & amiodarone induced hyperthyroidism. Admitted for tikosyn loading given persistent symptomatic atypical atrial flutter.     Recurrent, symptomatic atrial fibrillation & atypical atrial flutter  S/p PVI and RF ablation 2020, redo PVI with scar modification 3/2023  Arrhythmia History in brief:  - s/p HASEEB w/DCCV 2020  with quick reversion to AFL. Dofetilide initiated. Dose limited to 125mcg due to prolong QT.  - Continued to have break through  AF/AFL  - s/p  PVI and RFA of AFL 12/2020. EF subsequent improved to 45-50%.  - Recurrent AF 11/2022. Flecainide initiated. S/p successful DCCV 12/2022.  Flecainide switched to (?at some point) amiodarone.  - s/p PVI posterior and anterior wall scar modification RFA 3/2023. No recurrence on heart monitor. Amiodarone discontinued 10/2023.   - Recurrent symptomatic  AFL 4/2024 and amiodarone resumed. Later discontinued by endocrinology due to new hyperthyroidism. Started on methimazole.  - Rate controlled with digoxin 5/2025.     - Prior to admit: Some symptomatic improvement noted with digoxin but persistent dyspnea and fatigue on exertion persist.   - Admit EKG AFL with variable block 87bpm  - EP following  - Tikosyn initiated 6/26 at 2100, 6th dose scheduled for 6/29 AM  - converted to NSR early AM hours of 6/27  - cont tikosyn 250mcg q12hrs with EKG 2hrs post administration for QT evaluation   - this morning's QTc 441  - asymptomatic bradycardia to HR 40s overnight, holding Toprol XL today with plans to restart tomorrow at lower dose   - maintain K>4, Mg >2  - cont uninterrupted eliquis for thromboembolism protection     Tachy-mediated cardiomyopathy 35%> 50%  - TTE 5/13/25: EF 51%, in AF/AFL. Mildly dilated LA, moderately dilated RA.   - , minimally elevated but appears compensated   - admit wt 81.2kg  - today's wt 80.3kg (81.6)   - metop as above  - cont Entresto, dapa   - daily standing wts      Untreated TENISHA  - Recommend outpt evaluation     Mild Non obstructive CAD  - [facility] 2020: Co dominant system. Mild non obstructive dz (mRCA 10-30%)  - Eliquis in place of aspirin, statin     Hyperthyroidism   - 2/2 amiodarone use  - 6/26 TSH H but reflex T4 WNL  - cont methimazole     DVT ppx: eliquis     DISPO   Full code   Pending arrhythmia management, currently in SR with 6th dose tikosyn due tomorrow AM       Patient seen and discussed with Dr. Nicholas.      Bryanna Fuentes, APRN-CNP       [1]   Scheduled  medications   Medication Dose Route Frequency    apixaban  5 mg oral BID    atorvastatin  10 mg oral Daily    brimonidine  1 drop Right Eye BID    dapagliflozin propanediol  10 mg oral Daily    dofetilide  250 mcg oral q12h DIANDRA    furosemide  20 mg oral Daily    methIMAzole  5 mg oral Daily    [Held by provider] metoprolol succinate XL  75 mg oral BID    sacubitriL-valsartan  1 tablet oral BID    timolol  1 drop Right Eye Daily   [2]   PRN medications   Medication    acetaminophen    Or    acetaminophen    Or    acetaminophen    melatonin    polyethylene glycol   [3]   Continuous Medications   Medication Dose Last Rate

## 2025-06-28 NOTE — CARE PLAN
The patient's goals for the shift include      The clinical goals for the shift include Pt will remain HDS throughout this shift    Over the shift, the patient had no acute event this shift. Tikosyn dose #3 given this shift.      Problem: Pain - Adult  Goal: Verbalizes/displays adequate comfort level or baseline comfort level  Outcome: Progressing     Problem: Safety - Adult  Goal: Free from fall injury  Outcome: Progressing     Problem: Discharge Planning  Goal: Discharge to home or other facility with appropriate resources  Outcome: Progressing     Problem: Chronic Conditions and Co-morbidities  Goal: Patient's chronic conditions and co-morbidity symptoms are monitored and maintained or improved  Outcome: Progressing     Problem: Nutrition  Goal: Nutrient intake appropriate for maintaining nutritional needs  Outcome: Progressing     Problem: Heart Failure  Goal: Improved gas exchange this shift  Outcome: Progressing  Goal: Improved urinary output this shift  Outcome: Progressing  Goal: Reduction in peripheral edema within 24 hours  Outcome: Progressing  Goal: Report improvement of dyspnea/breathlessness this shift  Outcome: Progressing  Goal: Weight from fluid excess reduced over 2-3 days, then stabilize  Outcome: Progressing  Goal: Increase self care and/or family involvement in 24 hours  Outcome: Progressing

## 2025-06-28 NOTE — PROGRESS NOTES
HVI Attending Shared Visit Note    This is a shared visit. Please see Advanced Practice Provider's encounter note for additional details.      Briefly, 64M from [facility] admitted for dofetilide (Tikosyn) re-initiation and planned DCCV for symptomatic atypical AFL. He has a history with PVI and AFL ablations in 2020 and 2023, transient sinus rhythm, and recurrent AFL since 4/2024 following discontinuation of amiodarone due to hyperthyroidism. He remains anticoagulated and is off digoxin in preparation for cardioversion.    05/13/2025 TTE: EF 51%, mildly dilated LA, moderately dilated RA, reduced RV function, trace MR/TR, no pericardial effusion.  06/19/2025 ECG: Atrial flutter  11/23/2020 LHC/RHC: Mild non-obstructive CAD, co-dominant system, elevated filling pressures (RA 15, PCWP 22), normal CO.    Started dofetilide 250 o/n with self conversion to NSR. Monitor QTc.     Exam: Regular, Warm, no distress. Walking room. Bradycardic. QTc 428    # AFL/AF, symptomatic persistent atypical: c/w dofetilide 250 mcg q12h load with telemetry and QTC monitoring. Rhythm control strategy favored due to exertional intolerance.   # Tachy-mediated cardiomyopathy, recovered EF: EF 51% on 5/13. On GDMT (metoprolol, sacubitril-valsartan, dapagliflozin). Reduced RV function.   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 TSH 6.27 on 6/26. Monitor thyroid function.  # Mild non-obstructive CAD: [facility] 2020 with <30% mRCA stenosis. On atorvastatin and Eliquis.    # Dispo: Awaiting completion of dofetilide loading   Assessment & Plan  Heart failure    Atrial fibrillation (Multi)      Tez Nicholas MD    Objective   Admit Date: 6/26/2025  Hospital Length of Stay: 2   Home: [facility] [id number]    MEDICATIONS  Infusions:     Scheduled:  apixaban, 5 mg, BID  atorvastatin, 10 mg, Daily  brimonidine, 1 drop, BID  dapagliflozin propanediol, 10 mg, Daily  dofetilide, 250 mcg, q12h DIANDRA  furosemide, 20 mg,  Daily  methIMAzole, 5 mg, Daily  metoprolol succinate XL, 75 mg, BID  sacubitriL-valsartan, 1 tablet, BID      PRN:  acetaminophen, 650 mg, q4h PRN   Or  acetaminophen, 650 mg, q4h PRN   Or  acetaminophen, 650 mg, q4h PRN  melatonin, 3 mg, Nightly PRN  polyethylene glycol, 17 g, Daily PRN      Prior to Admission Meds:  Prescriptions Prior to Admission[1]    Vitals:      6/28/2025     4:25 AM 6/27/2025    11:30 PM 6/27/2025     8:56 PM 6/27/2025     8:16 PM 6/27/2025     3:21 PM 6/27/2025    11:40 AM 6/27/2025     8:15 AM   Vitals   Systolic 95 94  108 98 97 109   Diastolic 55 58  70 65 60 74   BP Location Left arm Left arm  Left arm Left arm Left arm Right arm   Heart Rate 54 52 63  63 57 57   Temp 36 °C (96.8 °F) 35.7 °C (96.3 °F)  36 °C (96.8 °F) 36 °C (96.8 °F) 36 °C (96.8 °F) 36 °C (96.8 °F)   Resp 18 16  18 18 18 18   Weight (lb) 177.03         BMI 26.92 kg/m2         BSA (m2) 1.96 m2           Wt Readings from Last 5 Encounters:   06/28/25 80.3 kg (177 lb 0.5 oz)   06/17/25 82.2 kg (181 lb 4.8 oz)   05/13/25 81 kg (178 lb 9.6 oz)   09/25/24 85.3 kg (188 lb)   08/20/24 85.3 kg (188 lb)       Intake/Output Summary (Last 24 hours) at 6/28/2025 0631  Last data filed at 6/28/2025 0442  Gross per 24 hour   Intake --   Output 2600 ml   Net -2600 ml       CHEST: Unlabored, Clear  CV:  Sinus bradycardia  NEURO:   RASS:    CAM:    LOC: Alert  Cognition: Appropriate judgement, Appropriate safety awareness, Appropriate attention/concentration, Follows commands  GCS: 15    DATA:  CMP:  Recent Labs     06/28/25  0440 06/27/25  1839 06/27/25  0513 06/26/25  1902 05/12/25  0759 06/25/24  1345 05/20/24  0810 02/27/24  1038 10/27/23  0853 07/31/23  1509 10/05/21  0930 11/27/20  0248 11/26/20  0348 11/25/20  1717 11/25/20  0411    136 138 138 134* 138 137 139   < > 141   < > 137 141 138 138   K 4.0 4.0 3.7 4.6 5.1 4.7 4.7 4.7   < > 4.4   < > 4.6 4.9 4.5 4.2    104 109* 106 103 103 106 107   < > 106   < > 106 107 106  "106   CO2 24 24 24 24 20 25 22 27   < > 26   < > 21 22 21 22   ANIONGAP 12 12 9* 13 11 15 14 10   < > 13   < > 15 17 16 14   BUN 17 19 18 23 31* 22 20 22   < > 21   < > 21 21 18 16   CREATININE 0.86 0.95 0.86 1.10 1.24 1.09 1.20 1.18   < > 1.25   < > 1.11 1.03 0.95 0.93   EGFR >90 88 >90 74 64 75 67 68   < >  --   --   --   --   --   --    MG 2.12  --  2.05 2.23  --   --   --   --   --  2.12  --  2.26 2.32 2.28 2.31    < > = values in this interval not displayed.     Recent Labs     06/28/25 0440 06/27/25 1839 06/27/25 0513 06/26/25 1902 05/12/25  0759 06/25/24  1345 05/20/24  0810 02/27/24  1038 10/27/23  0853 10/27/23  0853 07/31/23  1509 11/24/20  0405 11/23/20  1016   ALBUMIN 3.9 4.2 3.6 4.4 4.4 4.3 4.0 4.1   < > 4.2 4.1   < > 3.9   ALT  --   --  10 12 12 13 11  --   --  15 11  --  20   AST  --   --  14 17 23 15 16  --   --  20 18  --  24   BILITOT  --   --  0.4 0.4 1.4* 0.6 0.6  --   --  0.6 0.7  --  1.3*    < > = values in this interval not displayed.     CBC:  Recent Labs     06/28/25 0440 06/27/25 1839 06/27/25 0513 06/26/25 1902 05/12/25  0759 06/25/24  1345 06/10/24  1407 05/20/24  0810   WBC 5.4 6.4 4.2* 6.3 7.8 6.4 6.9 6.5   HGB 13.2* 13.5 12.2* 14.0 14.9 14.5 13.6 13.3*   HCT 41.2 40.8* 38.4* 42.7 45.7 44.2 41.4 40.7*    170 138* 194 195 211 194 219   MCV 89 90 91 89 90.1 89 90 88     COAG:   Recent Labs     11/27/20  0326 11/26/20  0349 11/25/20  0415 11/24/20  0405 11/23/20  1016   INR 1.4* 1.4* 1.4* 1.4* 1.3*     ABO: No results for input(s): \"ABO\" in the last 63458 hours.  HEME/ENDO:   Recent Labs     06/26/25  1902 05/12/25  0759 09/25/24  0935 06/25/24  1345 05/24/24  1516 05/20/24  0810 07/31/23  1509 11/23/20  1016   FERRITIN  --   --   --   --   --   --   --  136   IRONSAT  --   --   --   --   --  22*  --  24*   TSH 6.27* 4.43 4.30* 0.01*   < > 0.10*   < > 1.50   FREET4 1.27 1.4 1.29 2.04*   < > 2.47*  --   --     < > = values in this interval not displayed.     CARDIAC:   Recent " "Labs     06/26/25  1902 05/12/25  0759 02/27/24  1038 11/15/22  0917 11/23/20  1016 11/16/20  1700   * 164* 124* 437* 299* 519*     Recent Labs     05/20/24  0810 09/25/23  0903 11/23/20  1016 11/16/20  1700   CHOL 140 196 140 175   LDLF  --  93 84 111*   LDLCALC 74  --   --   --    HDL 53.6 70.3 41.7 46.5   TRIG 64 162* 73 88     TOX:No results for input(s): \"AMPHETAMINE\", \"BENZO\", \"CANNABINOID\", \"COCAI\", \"FENTANYL\", \"OPIATE\", \"OXYCODONE\", \"PCP\" in the last 75030 hours.    No lab exists for component: \"BARBSCRUR\"  MICRO: No results for input(s): \"ESR\", \"CRP\", \"PROCAL\" in the last 15552 hours.  No results found for the last 90 days.      EKG:   Recent Labs     06/19/25  1024 05/13/25  1140 05/24/24  0800 04/26/24  0800 02/27/24  0820 10/27/23  0812   ATRRATE 65 250 241   < > 64 58   VENTRATE 65 91 97   < > 64 58   PRINT 314  --   --   --  170 186   QRSDUR 78 76 82   < > 82 88   QTCFRED 468 397 352   < > 457 490   QTCCALCB 474 425 381   < > 462 486    < > = values in this interval not displayed.     Encounter Date: 06/17/25   ECG 12 Lead   Result Value    Ventricular Rate 65    Atrial Rate 65    MD Interval 314    QRS Duration 78    QT Interval 456    QTC Calculation(Bazett) 474    P Axis 56    R Axis 1    T Axis -14    QRS Count 11    Q Onset 223    P Onset 66    P Offset 92    T Offset 451    QTC Fredericia 468    Narrative    Atrial flutter  Nonspecific ST and T wave abnormality  Prolonged QT  Abnormal ECG    Confirmed by Serge Gallagher ([id number]) on 6/19/2025 11:35:29 AM     Echocardiogram:   Recent Labs     05/13/25  1055 02/27/24  0943   EF 51 46   LVIDD 4.30 4.90   RVFRWALLPKSP  --  9.73   TAPSE  --  2.1   Transthoracic Echo (TTE) Complete 05/13/2025    [facility], [address] [place] [address]  Tel [phone] and Fax [phone]    TRANSTHORACIC ECHOCARDIOGRAM REPORT      Patient Name:       OSCAR COTY       Reading Physician:    [id number] Live Madrigal MD  Study Date: "         5/13/2025           Ordering Provider:    [id number] ASHLEE DUGGAN  MRN/PID:            [id number]            Fellow:  Accession#:         [id number]        Nurse:  Date of Birth/Age:  1960 / 64 years Sonographer:          GENNARO Garcia RDCS  Gender assigned at  M                   Additional Staff:  Birth:  Height:             172.72 cm           Admit Date:  Weight:             79.38 kg            Admission Status:     Outpatient  BSA / BMI:          1.93 m2 / 26.61     Encounter#:           [id number]  kg/m2  Blood Pressure:     110/70 mmHg         Department Location:  [facility]  Non Invasive    Study Type:    TRANSTHORACIC ECHO (TTE) COMPLETE  Diagnosis/ICD: Paroxysmal atrial fibrillation-I48.0  Indication:    Chronic persistan A-fib  CPT Code:      Echo Complete w Full Doppler-94250  Study Detail: The following Echo studies were performed: 2D, M-Mode, Doppler and  color flow. Patient's heart rhythm is A-fib RVR.      PHYSICIAN INTERPRETATION:  Left Ventricle: Left ventricular ejection fraction is low normal, calculated by Pederson's biplane at 51%. The patient is in atrial fibrillation which may influence the estimate of left ventricular function and transvalvular flows. There are no regional left ventricular wall motion abnormalities. The left ventricular cavity size is normal. There is mildly increased septal and normal posterior left ventricular wall thickness. Left ventricular diastolic filling is indeterminate due to atrial fibrillation/flutter.  Left Atrium: The left atrium is mildly dilated.  Right Ventricle: The right ventricle is normal in size. There is reduced right ventricular systolic function.  Right Atrium: The right atrium is moderately dilated.  Aortic Valve: The aortic valve is structurally normal. There is no evidence of aortic valve regurgitation. The peak instantaneous gradient of the aortic valve is 3 mmHg.  Mitral Valve: The mitral valve is normal in structure.  There is trace mitral valve regurgitation.  Tricuspid Valve: The tricuspid valve is structurally normal. There is trace tricuspid regurgitation. The right ventricular systolic pressure is unable to be estimated.  Pulmonic Valve: The pulmonic valve is not well visualized. The pulmonic valve regurgitation was not well visualized.  Pericardium: There is no pericardial effusion noted.  Aorta: The aortic root is normal. There is upper limits of normal dilatation of the ascending aorta.  Systemic Veins: The inferior vena cava appears small in size.  In comparison to the previous echocardiogram(s): Compared with study dated 2/27/2024, no significant change.      CONCLUSIONS:  1. Left ventricular ejection fraction is low normal, calculated by Pederson's biplane at 51%.  2. Left ventricular diastolic filling is indeterminate due to atrial fibrillation/flutter.  3. There is reduced right ventricular systolic function.  4. The left atrium is mildly dilated.  5. The right atrium is moderately dilated.  6. The patient is in atrial fibrillation which may influence the estimate of left ventricular function and transvalvular flows.    QUANTITATIVE DATA SUMMARY:    2D MEASUREMENTS:          Normal Ranges:  Ao Root d:       3.80 cm  (2.0-3.7cm)  LAs:             4.80 cm  (2.7-4.0cm)  IVSd:            1.30 cm  (0.6-1.1cm)  LVPWd:           0.90 cm  (0.6-1.1cm)  LVIDd:           4.30 cm  (3.9-5.9cm)  LVIDs:           3.30 cm  LV Mass Index:   84 g/m2  LVEDV Index:     43 ml/m2  LV % FS          23.3 %      LEFT ATRIUM:                  Normal Ranges:  LA Vol A4C:        64.6 ml    (22+/-6mL/m2)  LA Vol A2C:        60.5 ml  LA Vol BP:         63.1 ml  LA Vol Index A4C:  33.5ml/m2  LA Vol Index A2C:  31.4 ml/m2  LA Vol Index BP:   32.7 ml/m2  LA Area A4C:       21.0 cm2  LA Area A2C:       20.5 cm2  LA Major Axis A4C: 5.8 cm  LA Major Axis A2C: 5.9 cm      RIGHT ATRIUM:                 Normal Ranges:  RA Vol A4C:        85.8 ml     (8.3-19.5ml)  RA Vol Index A4C:  44.4 ml/m2  RA Area A4C:       24.2 cm2  RA Major Axis A4C: 5.8 cm      AORTA MEASUREMENTS:         Normal Ranges:  Ao Sinus, d:        3.80 cm (2.1-3.5cm)  Asc Ao, d:          3.80 cm (2.1-3.4cm)      LV SYSTOLIC FUNCTION:  Normal Ranges:  EF-A4C View:    57 % (>=55%)  EF-A2C View:    45 %  EF-Biplane:     51 %  EF-3DQ:         56 %  LV EF Reported: 51 %      AORTIC VALVE:           Normal Ranges:  AoV Vmax:      0.88 m/s (<=1.7m/s)  AoV Peak PG:   3.1 mmHg (<20mmHg)  LVOT Max Dylan:  0.65 m/s (<=1.1m/s)  LVOT VTI:      12.00 cm  LVOT Diameter: 2.20 cm  (1.8-2.4cm)  AoV Area,Vmax: 2.82 cm2 (2.5-4.5cm2)      RIGHT VENTRICLE:  RV Basal 4.00 cm  RV Mid   2.10 cm  RV Major 6.5 cm      PULMONIC VALVE:          Normal Ranges:  PV Max Dylan:     0.6 m/s  (0.6-0.9m/s)  PV Max P.3 mmHg      [id number] Live Madrigal MD  Electronically signed on 2025 at 12:29:20 PM        ** Final **    Coronary Angiography:   Adult Cath     Narrative  [facility], Cath Lab, [address] [place] [address]    Cardiovascular Catheterization Report    Patient Name:     OSCAR Schwarz           [id number] Aly Paris MD  PERIC       Physician:  Study Date:       2020  Verifying Physician: [id number] Paris MD  MRN/PID:          [id number]    Cardiologist:  Accession/Order#: [id number]   Fellow:              [id number] Mamta Quiroz MD  YOB: 1960    Fellow:  Gender:           M           Referring Physician:  Admit Date:                   Referring Physician: [id number] Mirella Mathews MD  Surgeon:                      Referring Physician: [id number] Rolo Bright MD      Study: Left and Right Heart Catheterization      Indications:  OSCAR ANSARI is a 60 year old male who presents with atrial fibrillation. Cardiomyopathy and left ventricular dysfunction, with an asymptomatic chest pain assessment. Study performed as an elective cath procedure.    Appropriate Use  Criteria:  Known or suspected cardiomyopathy with or without heart failure; AUC score = 7.    Medical History:  Stress test performed: No. CTA performed: No. Agatston accessed: Yes. Agatston Score: 144. LVEF Assessed: Yes. LVEF = 35%.    Procedure Description:  After infiltration with 2% Lidocaine, the right radial artery was cannulated with a modified Seldinger technique. Subsequently a 6 Sammarinese sheath was placed retrograde in the right radial artery. After infiltration of local anesthetic, the right internal jugular vein was cannulated with a micropuncture technique. A 9 sheath was placed in the vein. A balloon tipped catheter was advanced through the right heart to record pressures and balloon tipped catheter was positioned in the right heart system to record pressures and remained in the patient when transferred from the lab. Cardiac output was calculated via the Carloz method. After completion of the procedure, the arterial sheath was pulled and a TR Band Radial Compression Device was utilized to obtain patent hemostasis.    Coronary Angiography:  The coronary circulation is co-dominant.    Left Main Coronary Artery:  The left main coronary artery is a normal and a short caliber vessel. The left main arises normally from the left coronary sinus of Valsalva and bifurcates into the LAD and circumflex coronary arteries. The left main coronary artery showed a normal vessel.    Left Anterior Descending Coronary Artery Distribution:  The left anterior descending coronary artery is a normal caliber vessel. The LAD arises normally from the left main coronary artery. The LAD demonstrated mild irregularities and no significant disease or stenosis greater than 30%. The 1st diagonal branch is a normal caliber vessel. The 1st diagonal branch showed a normal vessel. The 2nd diagonal branch is a normal caliber vessel. The 2nd diagonal branch demonstrated a normal vessel.    Circumflex Coronary Artery Distribution:  The  circumflex coronary artery is a normal caliber vessel. The circumflex arises normally from the left main coronary artery and terminates in the AV groove. The circumflex revealed a normal vessel. The 1st obtuse marginal branch is a normal caliber vessel. The 1st obtuse marginal branch showed a normal vessel. The 2nd obtuse marginal branch is a medium-sized caliber vessel. The 2nd obtuse marginal branch demonstrated a normal vessel. The 3rd obtuse marginal branch is a medium-sized caliber vessel. The 3rd obtuse marginal branch revealed normal.    Ramus Intermedius:  The ramus intermedius is a medium-sized caliber vessel. The ramus intermedius showed a normal vessel.    Right Heart Catheterization:  A balloon tipped catheter was advanced through the right heart to record pressures and balloon tipped catheter was positioned in the right heart system to record pressures and remained in the patient when transferred from the lab. Cardiac output was calculated via the Carloz method. # Normal Cardiac output and index per Carloz method calculation  # Elevated left sided filling pressures with mean PCWP of 22 mmHG.  # Elevated right sided filling pressures with mean RA og 15 mmHg.  # Normal pulmonary artery pressure.    Right Coronary Artery Distribution:    The right coronary artery is a normal caliber vessel. The RCA arises normally from the right sinus of Valsalva. The RCA showed no significant disease or stenosis greater than 30% and mild irregularities. The mid right coronary artery showed 10 to 30% stenosis.    Coronary Interventions:    Coronary Lesion Summary:  Vessel      Stenosis      Vessel Segment  RCA    10 to 30% stenosis      mid      Hemo Personnel:  +----------------+------------+  Name            Duty          +----------------+------------+  Aly Paris MD, MD 1  +----------------+------------+  Nilam Yoder RNPDIANA NURSE 1  +----------------+------------+  Vijaya Echeverria RNPROC NURSE  2  +----------------+------------+    Follow-up with HFICU team.    ____________________________________________________________________________________  CONCLUSIONS:  1. Mild non-obstructive coronary artery disease in a co-dominant system.  2. Normal Cardiac ouput and index.  3. Elevated filling pressures with mean PCWP of 22 mmHG and mean RA of 15 mmHg.    ____________________________________________________________________________________  CPT Codes:  Moderate Sedation Services initial 15 minutes patient >5 years-81098; Moderate Sedation Services 1st additional 15 minutes patient >5 years-34314; Moderate Sedation Services 2nd additional 15 minutes patient >5 years-60890; Coronary Angiography S&I only ([facility])([facility])-36212; Right Heart Cath O2/Cardiac output without biopsy ([facility])-03481    ICD 10 Codes:  I50.20-Unspecified systolic (congestive) heart failure    [id number] Aly Paris MD  Performing Physician  Electronically signed by [id number] Aly Paris MD on 11/27/2020 at 10:35:36 AM      cc Report to: x    cc Report to: [id number] Mirella Mathews MD    cc Report to: [id number] Rolo Bright MD      Right Heart Cath: No results found for this or any previous visit from the past 1800 days.    Cardiac Scoring:   CT HEART CALCIUM SCORING WO IV CONTRAST 11/19/2020    Narrative  MRN: [id number]  Patient Name: OSCAR ANSARI    STUDY:  CT CARDIAC SCORING;  11/19/2020 10:18 am    INDICATION:  CP, MCNAMARA.    COMPARISON:  None.    ACCESSION NUMBER(S):  [id number]    ORDERING CLINICIAN:  ROLO BRIGHT    TECHNIQUE:  Using prospective ECG gating, CT scan of the coronary arteries was  performed without intravenous contrast. Coronary calcium scoring  was  performed according to the method of Agatston.    FINDINGS:  The score and distribution of calcium in the coronary arteries is as  follows:    ,  LAD 0,  LCx 0,  RCA 0,    Total   144    The visualized mid/lower ascending thoracic aorta measures 4.4 cm in  diameter in the axial images at the  level of the main pulmonary  artery. The heart is normal in size. No pericardial effusion is  present. Main pulmonary is aneurysmal measuring 3.2 cm in diameter.    No gross evidence of mediastinal or hilar lymphadenopathy or masses  is identified. The visualized esophagus is unremarkable. A 8.7 x 4.2  oblong noncalcified nodule is noted in the right middle lobe (image  29 of 68).. No prior images are available for comparison. See  Fleischner criteria below for follow-up    The visualized segments of the lungs are normally expanded.    The visualized subdiaphragmatic structures appear intact. Small  hiatal hernia is noted.    Impression  1. Coronary artery calcium score of 144.  2. The mid ascending thoracic aorta is aneurysmal measuring 4.4 cm in  axial images at the level the main pulmonary artery. Would recommend  dedicated cardiac MRI to assess aortic valve and thoracic MRA in  12-18 months.  3. Main pulmonary artery is aneurysmal measuring 3.2 cm in diameter.  Correlate clinically for pulmonary hypertension.  4. Small hiatal hernia.  5. An 8.7 x 4.2 mm oblong noncalcified nodule is noted in the right  middle lobe. Please see Fleischner criteria below for follow-up    *Coronary Artery Calcium Gated and Nongated Agatston score  Score                                      Risk  0                                       Very low  1-99                                  Mildly increased  100-299                             Moderately increased  >300                                Moderate to severely increased    Yves et al. JCCT 2016 (http://dx.doi.org/10.1016/j.jcct.2016.11.003)    DUVAL 10-Year CHD Risk with Coronary Artery Calcification can be  calcuated using link below  [email or web address]://[email or web address]-[email or web address]/[email or web address].[email or web address]  Clarence benjamin al. JACC 2015 (http://dx.doi.org/10.1016/j.j  acc.2015.08.035)    *Follow-up recommendations according to the Fleischner Society  Criteria (Mike Salvador et al.,  Guidelines for management of  incidental pulmonary nodules detected on CT images: From the  Fleischner Society 2017, DOI:  http://dx.doi.org/10.1148/radiol.7109437552.)  Dimensions are average of long and short axis, rounded to the nearest  millimeter. Consider all relevant risk factors.  SOLID NODULES:  SINGLE NODULE:  Low Risk:  < 6 mm No routine follow-up  6-8 mm CT at 6-12 months, then consider CT at 18-24 months  > 8 mm Consider CT at 3 months, PET/CT, or tissue sampling  Nodules <6 mm do not require routine follow-up, but certain patients  at high risk with suspicious nodule morphology, upper lobe location,  or both may warrant 12-month follow-up (recommendation 1A).  High risk:  < 6 mm Optional CT at 12 months  6-8 mm CT at 6-12 months, then CT at 18-24 months  > 8 mm Consider CT at 3 months, PET/CT, or tissue sampling  Nodules < 6 mm do not require routine follow-up, but certain patients  at high risk with suspicious nodule morphology, upper lobe location,  or both may warrant 12-month follow-up (recommendation 1A).    MULTIPLE NODULES:  Low risk:  < 6 mm No routine follow-up  6-8 mm CT at 3-6 months, then consider CT at 18-24 months  > 8 mm CT at 3-6 months, then consider CT at 18-24 months  Use most suspicious nodule as guide to management. Follow-up  intervals may vary according to size and risk (recommendation 2A).  High risk:  < 6 mm Optional CT at 12 months  6-8 mm CT at 3-6 months, then at 18-24 months  > 8 mm CT at 3-6 months, then at 18-24 months    Use most suspicious nodule as guide to management. Follow-up  intervals may vary according to size and risk (recommendation 2A).    Cardiac MRI: No results found for this or any previous visit from the past 1800 days.    Nuclear:No results found for this or any previous visit from the past 1800 days.    Metabolic Stress: No results found for this or any previous visit from the past 1800 days.      Imaging  No results found.    Cardiology, Vascular, and  Other Imaging  No other imaging results found for the past 7 days       LDA:   NUTRITION: Adult diet Regular  EMERGENCY CONTACT: Extended Emergency Contact Information  Primary Emergency Contact: Ananya Ramirez  Address: [address] [place] [address] [facility]  Home Phone: [phone]  Mobile Phone: [phone]  Relation: Spouse  CODE STATUS: Full Code  DISPO: Discharge Planning  Living Arrangements: Spouse/significant other  Support Systems: Spouse/significant other  Assistance Needed: None  Type of Residence: Private residence  Number of Stairs to Enter Residence: 2  Number of Stairs Within Residence: 15  Do you have animals or pets at home?: No  Who is requesting discharge planning?: Provider  Home or Post Acute Services: None  Expected Discharge Disposition: Home  Does the patient need discharge transport arranged?: No  AMPAC: Daily Activity - Total Score: 24    FOLLOWUP: No future appointments.               [1]   Medications Prior to Admission   Medication Sig Dispense Refill Last Dose/Taking    apixaban (Eliquis) 5 mg tablet Take 1 tablet (5 mg) by mouth 2 times a day. 180 tablet 3     atorvastatin (Lipitor) 10 mg tablet Take 1 tablet (10 mg) by mouth once daily. as directed 90 tablet 3     brimonidine (AlphaGAN) 0.2 % ophthalmic solution Administer 1 drop into the right eye 2 times a day. 15 mL 3     dapagliflozin propanediol (Farxiga) 10 mg tablet Take 1 tablet (10 mg) by mouth once daily. 30 tablet 11     digoxin (Lanoxin) 250 MCG tab;et Take 1 tablet (250 mcg) by mouth once daily. 90 tablet 3     Entresto  mg tablet Take 1 tablet by mouth 2 times a day. 60 tablet 11     furosemide (Lasix) 20 mg tablet TAKE 1 TABLET BY MOUTH ONCE DAILY 270 tablet 0     methIMAzole (Tapazole) 5 mg tablet Take 1 tablet (5 mg) by mouth once daily. 30 tablet 11     metoprolol succinate XL (Toprol-XL) 100 mg 24 hr tablet Take 1 tablet (100 mg) by mouth 2 times a day. Do not crush or  chew. 180 tablet 0     omeprazole (PriLOSEC) 40 mg DR capsule Take 1 capsule (40 mg) by mouth 2 times a day before meals for 14 days. Do not crush or chew. (Patient not taking: Reported on 5/13/2025) 28 capsule 0     omeprazole (PriLOSEC) 40 mg DR capsule Take 1 capsule (40 mg) by mouth once daily. Do not crush or chew. (Patient not taking: Reported on 6/27/2025) 90 capsule 0 Not Taking    timolol (Timoptic) 0.5 % ophthalmic solution USE 1 DROP IN THE RIGHT EYE EVERY MORNING. 55 mL 0     timolol (Timoptic) 0.5 % ophthalmic solution Administer 1 drop into the right eye once daily in the morning. 15 mL 3

## 2025-06-28 NOTE — PROGRESS NOTES
Subjective Data:  Short run of flutter thsi AM but resolved spontaneously  Remains otherwise NSR with normal Qt interval   No complaints from patient       Objective Data:  Last Recorded Vitals:  Vitals:    06/27/25 2056 06/27/25 2330 06/28/25 0425 06/28/25 0805   BP:  94/58 95/55 106/72   BP Location:  Left arm Left arm Left arm   Patient Position:  Sitting Sitting Sitting   Pulse: 63 52 54 53   Resp:  16 18 18   Temp:  35.7 °C (96.3 °F) 36 °C (96.8 °F) 36.5 °C (97.7 °F)   TempSrc:  Temporal Temporal Temporal   SpO2:  98% 98% 97%   Weight:   80.3 kg (177 lb 0.5 oz)    Height:           Last Labs:  CBC - 6/28/2025:  4:40 AM  5.4 13.2 152    41.2      CMP - 6/28/2025:  4:40 AM  8.9 5.9 14 --- 0.4   4.5 3.9 10 48      PTT - No results in last year.  _   _ _     BNP   Date/Time Value Ref Range Status   06/26/2025 07:02  0 - 99 pg/mL Final   05/12/2025 07:59  <100 pg/mL Final     Comment:        BNP levels increase with age in the general  population with the highest values seen in  individuals greater than 75 years of age.  Reference: J. Am. Guillermina. Cardiol. 2002; 40:976-982.        02/27/2024 10:38  0 - 99 pg/mL Final     LDLCALC   Date/Time Value Ref Range Status   05/20/2024 08:10 AM 74 <=99 mg/dL Final     Comment:                                 Near   Borderline      AGE      Desirable  Optimal    High     High     Very High     0-19 Y     0 - 109     ---    110-129   >/= 130     ----    20-24 Y     0 - 119     ---    120-159   >/= 160     ----      >24 Y     0 -  99   100-129  130-159   160-189     >/=190       VLDL   Date/Time Value Ref Range Status   05/20/2024 08:10 AM 13 0 - 40 mg/dL Final   09/25/2023 09:03 AM 32 0 - 40 mg/dL Final   11/23/2020 10:16 AM 15 0 - 40 mg/dL Final   11/16/2020 05:00 PM 18 0 - 40 mg/dL Final      Last I/O:  I/O last 3 completed shifts:  In: - (0 mL/kg)   Out: 3825 (47.6 mL/kg) [Urine:3825 (1.3 mL/kg/hr)]  Weight: 80.3 kg     Past Cardiology Tests (Last 3  Years):  EKG:  ECG 12 Lead 06/19/2025      ECG 12 lead (Clinic Performed) 05/13/2025      ECG 12 lead (Clinic Performed) 05/24/2024      ECG 12 Lead 04/26/2024      ECG 12 lead (Clinic Performed) 02/27/2024      ECG 12 lead (Clinic Performed) 10/27/2023    Echo:  Transthoracic echo (TTE) complete 05/13/2025      Transthoracic echo (TTE) complete 02/27/2024    Ejection Fractions:  EF   Date/Time Value Ref Range Status   05/13/2025 10:55 AM 51 %    02/27/2024 09:43 AM 46 %      Cath:  No results found for this or any previous visit from the past 1095 days.    Stress Test:  No results found for this or any previous visit from the past 1095 days.    Cardiac Imaging:  No results found for this or any previous visit from the past 1095 days.      Inpatient Medications:  Scheduled Medications[1]  PRN Medications[2]  Continuous Medications[3]    Physical Exam:  GEN: NAD  HEENT: ATNC  CV: RRR  Pulm: no increased WOB  Abdomen: NTND  Ext: warm  Neuro: A+Ox3  Psych: appropriate       Assessment/Plan     Endy Nuñez is a 64 y.o. male with a PMHx of tachymediated caridomyopathy with improved EF, AF/AFL s/p PVI and RFA x2 (2020, 2022), untreated TENISHA, mild non obstructive CAD,  & amiodarone induced hyperthyroidism. Admitted for tikosyn loading.     6/26: first dose during evening with conversion to NSR  6/27: qt wnls 426  6/28: remains in nsr with Qt 441; brief run of flutter on telemetry      #AF/AFL s/p PVI and RFA x2 (2020, 2022)  #Atypical flutter  -Tikosyn 250 mcg BID, 1st dose, 6/26 9 PM. Converted to NSR with 1st dose. (6 inpatient monitored doses)   -Stop digoxin.   -agree to decrease metoprolol due to sinus bradycardia   -Keep K>4, Mg>2   -EKG 2 hours after each dose. Stop tikosyn if Qtc increases by >15%.   -Instructed patient on tikosyn use. See DC instructions below.    -Last monitored dose would be 6/29 AM if loading is successful.- likely can dc after       ---------------------------------------------------------------------------------------------  IF YOU MISS A DOSE OF TIKOSYN (dofetilide), DO NOT TRY TO MAKE UP THE DOSE.  NEVER TAKE 2 DOSES AT THE SAME TIME.     *You should have bloodwork to check kidney function and electrolytes every 3-6 months with your primary provider or primary cardiologist while on Tikosyn. You will also need an EKG at least every 6 months while on Tikosyn.     *DO NOT take any herbs or supplements without first discussing with your healthcare provider or pharmacist. DO NOT eat grapefruit or drink grapefruit juice with Tikosyn (other citrus fruit is okay).  DO NOT break, chew, or open the capsules; they must be swallowed whole.     *You must tell any provider that treats you that you are on Tikosyn (due to potential for drug-drug interactions).  Keep an up-to-date medication list in your wallet (in addition to any list you keep in your phone).     * A website for lists of medications that can prolong the QT interval (what we measure on EKG during Tikosyn admission) is: [employer]     * If you feel as though you are out of rhythm again, please call Dr. Yasmani Francisco's office at  or, if it's an emergency, please call 911 or have someone drive you to the nearest emergency room.     * Be careful not to run out of Tikosyn; get it refilled a week before you will run out. If you need more refills, please call Dr Yasmani Francisco's office or send them a message via [employer].     * Continue to follow up with your primary cardiologist, primary care physician, and any other specialists you normally see.     Thank you for this consult. Recommendations are preliminary until note is co-signed by an attending.     Peripheral IV 06/26/25 20 G Left;Posterior Forearm (Active)   Site Assessment Clean;Dry;Intact 06/28/25 0800   Dressing Type Tape;Transparent 06/28/25 0800   Line Status Capped;Flushed;Saline locked 06/28/25 0800   Dressing Status  Clean;Dry;Occlusive 06/28/25 0800   Number of days: 2       Code Status:  Full Code    I spent 25 minutes in the professional and overall care of this patient.        Perry Johnston MD       [1]   Scheduled medications   Medication Dose Route Frequency    apixaban  5 mg oral BID    atorvastatin  10 mg oral Daily    brimonidine  1 drop Right Eye BID    dapagliflozin propanediol  10 mg oral Daily    dofetilide  250 mcg oral q12h DIANDRA    furosemide  20 mg oral Daily    methIMAzole  5 mg oral Daily    [Held by provider] metoprolol succinate XL  75 mg oral BID    sacubitriL-valsartan  1 tablet oral BID    timolol  1 drop Right Eye Daily   [2]   PRN medications   Medication    acetaminophen    Or    acetaminophen    Or    acetaminophen    melatonin    polyethylene glycol   [3]   Continuous Medications   Medication Dose Last Rate

## 2025-06-29 ENCOUNTER — PHARMACY VISIT (OUTPATIENT)
Dept: PHARMACY | Facility: CLINIC | Age: 65
End: 2025-06-29
Payer: COMMERCIAL

## 2025-06-29 ENCOUNTER — APPOINTMENT (OUTPATIENT)
Dept: CARDIOLOGY | Facility: HOSPITAL | Age: 65
DRG: 310 | End: 2025-06-29
Payer: COMMERCIAL

## 2025-06-29 VITALS
HEART RATE: 61 BPM | WEIGHT: 178.13 LBS | OXYGEN SATURATION: 98 % | DIASTOLIC BLOOD PRESSURE: 64 MMHG | HEIGHT: 68 IN | SYSTOLIC BLOOD PRESSURE: 100 MMHG | BODY MASS INDEX: 27 KG/M2 | TEMPERATURE: 96.6 F | RESPIRATION RATE: 18 BRPM

## 2025-06-29 LAB
ALBUMIN SERPL BCP-MCNC: 3.9 G/DL (ref 3.4–5)
ANION GAP SERPL CALC-SCNC: 10 MMOL/L (ref 10–20)
BUN SERPL-MCNC: 18 MG/DL (ref 6–23)
CALCIUM SERPL-MCNC: 9 MG/DL (ref 8.6–10.6)
CHLORIDE SERPL-SCNC: 106 MMOL/L (ref 98–107)
CO2 SERPL-SCNC: 26 MMOL/L (ref 21–32)
CREAT SERPL-MCNC: 1.04 MG/DL (ref 0.5–1.3)
EGFRCR SERPLBLD CKD-EPI 2021: 79 ML/MIN/1.73M*2
GLUCOSE SERPL-MCNC: 96 MG/DL (ref 74–99)
MAGNESIUM SERPL-MCNC: 2.1 MG/DL (ref 1.6–2.4)
MAGNESIUM SERPL-MCNC: 2.17 MG/DL (ref 1.6–2.4)
PHOSPHATE SERPL-MCNC: 4.5 MG/DL (ref 2.5–4.9)
POTASSIUM SERPL-SCNC: 4.1 MMOL/L (ref 3.5–5.3)
SODIUM SERPL-SCNC: 138 MMOL/L (ref 136–145)

## 2025-06-29 PROCEDURE — 2500000001 HC RX 250 WO HCPCS SELF ADMINISTERED DRUGS (ALT 637 FOR MEDICARE OP): Performed by: PHYSICIAN ASSISTANT

## 2025-06-29 PROCEDURE — 93005 ELECTROCARDIOGRAM TRACING: CPT

## 2025-06-29 PROCEDURE — 83735 ASSAY OF MAGNESIUM: CPT | Performed by: NURSE PRACTITIONER

## 2025-06-29 PROCEDURE — RXMED WILLOW AMBULATORY MEDICATION CHARGE

## 2025-06-29 PROCEDURE — 36415 COLL VENOUS BLD VENIPUNCTURE: CPT | Performed by: NURSE PRACTITIONER

## 2025-06-29 PROCEDURE — 99232 SBSQ HOSP IP/OBS MODERATE 35: CPT | Performed by: STUDENT IN AN ORGANIZED HEALTH CARE EDUCATION/TRAINING PROGRAM

## 2025-06-29 PROCEDURE — 2500000001 HC RX 250 WO HCPCS SELF ADMINISTERED DRUGS (ALT 637 FOR MEDICARE OP)

## 2025-06-29 PROCEDURE — 99239 HOSP IP/OBS DSCHRG MGMT >30: CPT | Performed by: STUDENT IN AN ORGANIZED HEALTH CARE EDUCATION/TRAINING PROGRAM

## 2025-06-29 PROCEDURE — 93010 ELECTROCARDIOGRAM REPORT: CPT | Performed by: INTERNAL MEDICINE

## 2025-06-29 PROCEDURE — 80069 RENAL FUNCTION PANEL: CPT | Performed by: NURSE PRACTITIONER

## 2025-06-29 RX ORDER — DOFETILIDE 0.25 MG/1
250 CAPSULE ORAL EVERY 12 HOURS SCHEDULED
Qty: 180 CAPSULE | Refills: 0 | Status: SHIPPED | OUTPATIENT
Start: 2025-06-29 | End: 2025-09-27

## 2025-06-29 RX ADMIN — FUROSEMIDE 20 MG: 20 TABLET ORAL at 09:01

## 2025-06-29 RX ADMIN — ATORVASTATIN CALCIUM 10 MG: 10 TABLET, FILM COATED ORAL at 09:01

## 2025-06-29 RX ADMIN — APIXABAN 5 MG: 5 TABLET, FILM COATED ORAL at 09:01

## 2025-06-29 RX ADMIN — DAPAGLIFLOZIN 10 MG: 10 TABLET, FILM COATED ORAL at 09:01

## 2025-06-29 RX ADMIN — SACUBITRIL AND VALSARTAN 1 TABLET: 97; 103 TABLET, FILM COATED ORAL at 09:01

## 2025-06-29 RX ADMIN — BRIMONIDINE TARTRATE 1 DROP: 2 SOLUTION/ DROPS OPHTHALMIC at 09:01

## 2025-06-29 RX ADMIN — METHIMAZOLE 5 MG: 5 TABLET ORAL at 09:01

## 2025-06-29 RX ADMIN — DOFETILIDE 250 MCG: 0.25 CAPSULE ORAL at 09:01

## 2025-06-29 RX ADMIN — TIMOLOL MALEATE 1 DROP: 5 SOLUTION/ DROPS OPHTHALMIC at 09:00

## 2025-06-29 ASSESSMENT — COGNITIVE AND FUNCTIONAL STATUS - GENERAL
MOBILITY SCORE: 24
DAILY ACTIVITIY SCORE: 24

## 2025-06-29 ASSESSMENT — PAIN SCALES - GENERAL: PAINLEVEL_OUTOF10: 0 - NO PAIN

## 2025-06-29 ASSESSMENT — PAIN - FUNCTIONAL ASSESSMENT: PAIN_FUNCTIONAL_ASSESSMENT: 0-10

## 2025-06-29 NOTE — PROGRESS NOTES
HVI Attending Shared Visit Note    This is a shared visit. Please see Advanced Practice Provider's encounter note for additional details.      Briefly, 64M from [facility] admitted for dofetilide (Tikosyn) re-initiation and planned DCCV for symptomatic atypical AFL. He has a history with PVI and AFL ablations in 2020 and 2023, transient sinus rhythm, and recurrent AFL since 4/2024 following discontinuation of amiodarone due to hyperthyroidism. He remains anticoagulated and is off digoxin in preparation for cardioversion.    05/13/2025 TTE: EF 51%, mildly dilated LA, moderately dilated RA, reduced RV function, trace MR/TR, no pericardial effusion.  06/19/2025 ECG: Atrial flutter  11/23/2020 LHC/RHC: Mild non-obstructive CAD, co-dominant system, elevated filling pressures (RA 15, PCWP 22), normal CO.    Started dofetilide 250 o/n with self conversion to NSR. Monitor QTc.     Exam: Regular, Warm, no distress. Walking room. Bradycardic. QTc 428    # AFL/AF, symptomatic persistent atypical: c/w dofetilide 250 mcg q12h load with telemetry and QTC monitoring. Rhythm control strategy favored due to exertional intolerance.   # Tachy-mediated cardiomyopathy, recovered EF: EF 51% on 5/13. On GDMT (metoprolol, sacubitril-valsartan, dapagliflozin). Reduced RV function.   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 TSH 6.27 on 6/26. Monitor thyroid function.  # Mild non-obstructive CAD: [facility] 2020 with <30% mRCA stenosis. On atorvastatin and Eliquis.    # Dispo: Awaiting completion of dofetilide loading   Assessment & Plan  Heart failure    Atrial fibrillation (Multi)      Tez Nicholas MD    Objective   Admit Date: 6/26/2025  Hospital Length of Stay: 3   Home: [facility] [id number]    MEDICATIONS  Infusions:     Scheduled:  apixaban, 5 mg, BID  atorvastatin, 10 mg, Daily  brimonidine, 1 drop, BID  dapagliflozin propanediol, 10 mg, Daily  dofetilide, 250 mcg, q12h DIANDRA  furosemide, 20 mg,  Daily  methIMAzole, 5 mg, Daily  [Held by provider] metoprolol succinate XL, 75 mg, BID  sacubitriL-valsartan, 1 tablet, BID  timolol, 1 drop, Daily      PRN:  acetaminophen, 650 mg, q4h PRN   Or  acetaminophen, 650 mg, q4h PRN   Or  acetaminophen, 650 mg, q4h PRN  melatonin, 3 mg, Nightly PRN  polyethylene glycol, 17 g, Daily PRN      Prior to Admission Meds:  Prescriptions Prior to Admission[1]    Vitals:      6/29/2025     7:47 AM 6/29/2025     3:47 AM 6/28/2025    11:22 PM 6/28/2025     9:14 PM 6/28/2025     8:04 PM 6/28/2025     4:14 PM 6/28/2025    12:22 PM   Vitals   Systolic 100 103 97  99 93 103   Diastolic 64 63 62  56 59 66   BP Location Left arm     Left arm Left arm   Heart Rate 61 56 53 66 58 59 54   Temp 35.9 °C (96.6 °F) 36 °C (96.8 °F) 36.4 °C (97.5 °F)  36.4 °C (97.5 °F) 36.3 °C (97.3 °F) 36.2 °C (97.2 °F)   Resp 18 16 18  18 18 18   Weight (lb)  178.13        BMI  27.08 kg/m2        BSA (m2)  1.97 m2          Wt Readings from Last 5 Encounters:   06/29/25 80.8 kg (178 lb 2.1 oz)   06/17/25 82.2 kg (181 lb 4.8 oz)   05/13/25 81 kg (178 lb 9.6 oz)   09/25/24 85.3 kg (188 lb)   08/20/24 85.3 kg (188 lb)       Intake/Output Summary (Last 24 hours) at 6/29/2025 0912  Last data filed at 6/29/2025 0347  Gross per 24 hour   Intake 660 ml   Output 1350 ml   Net -690 ml       CHEST: Unlabored, Clear  CV:  Sinus bradycardia  NEURO:   RASS:    CAM:    LOC: Alert  Cognition: Appropriate judgement, Follows commands  GCS: 15    DATA:  CMP:  Recent Labs     06/29/25  0432 06/28/25  1743 06/28/25  0440 06/27/25  1839 06/27/25  0513 06/26/25  1902 05/12/25  0759 06/25/24  1345 10/27/23  0853 07/31/23  1509 10/05/21  0930 11/27/20  0248 11/26/20  0348 11/25/20  1717    137 139 136 138 138 134* 138   < > 141   < > 137 141 138   K 4.1 4.1 4.0 4.0 3.7 4.6 5.1 4.7   < > 4.4   < > 4.6 4.9 4.5    105 107 104 109* 106 103 103   < > 106   < > 106 107 106   CO2 26 25 24 24 24 24 20 25   < > 26   < > 21 22 21  "  ANIONGAP 10 11 12 12 9* 13 11 15   < > 13   < > 15 17 16   BUN 18 21 17 19 18 23 31* 22   < > 21   < > 21 21 18   CREATININE 1.04 1.01 0.86 0.95 0.86 1.10 1.24 1.09   < > 1.25   < > 1.11 1.03 0.95   EGFR 79 82 >90 88 >90 74 64 75   < >  --   --   --   --   --    MG  --  2.20 2.12  --  2.05 2.23  --   --   --  2.12  --  2.26 2.32 2.28    < > = values in this interval not displayed.     Recent Labs     06/29/25 0432 06/28/25 1743 06/28/25 0440 06/27/25 1839 06/27/25  0513 06/26/25  1902 05/12/25  0759 06/25/24  1345 05/20/24  0810 02/27/24  1038 10/27/23  0853 07/31/23  1509 11/24/20  0405 11/23/20  1016   ALBUMIN 3.9 4.1 3.9 4.2 3.6 4.4 4.4 4.3 4.0   < > 4.2 4.1   < > 3.9   ALT  --   --   --   --  10 12 12 13 11  --  15 11  --  20   AST  --   --   --   --  14 17 23 15 16  --  20 18  --  24   BILITOT  --   --   --   --  0.4 0.4 1.4* 0.6 0.6  --  0.6 0.7  --  1.3*    < > = values in this interval not displayed.     CBC:  Recent Labs     06/28/25 1743 06/28/25 0440 06/27/25 1839 06/27/25  0513 06/26/25  1902 05/12/25  0759 06/25/24  1345 06/10/24  1407   WBC 6.3 5.4 6.4 4.2* 6.3 7.8 6.4 6.9   HGB 13.9 13.2* 13.5 12.2* 14.0 14.9 14.5 13.6   HCT 44.0 41.2 40.8* 38.4* 42.7 45.7 44.2 41.4    152 170 138* 194 195 211 194   MCV 91 89 90 91 89 90.1 89 90     COAG:   Recent Labs     11/27/20  0326 11/26/20  0349 11/25/20  0415 11/24/20  0405 11/23/20  1016   INR 1.4* 1.4* 1.4* 1.4* 1.3*     ABO: No results for input(s): \"ABO\" in the last 51404 hours.  HEME/ENDO:   Recent Labs     06/26/25 1902 05/12/25  0759 09/25/24  0935 06/25/24  1345 05/24/24  1516 05/20/24  0810 07/31/23  1509 11/23/20  1016   FERRITIN  --   --   --   --   --   --   --  136   IRONSAT  --   --   --   --   --  22*  --  24*   TSH 6.27* 4.43 4.30* 0.01*   < > 0.10*   < > 1.50   FREET4 1.27 1.4 1.29 2.04*   < > 2.47*  --   --     < > = values in this interval not displayed.     CARDIAC:   Recent Labs     06/26/25 1902 05/12/25  0759 " "02/27/24  1038 11/15/22  0917 11/23/20  1016 11/16/20  1700   * 164* 124* 437* 299* 519*     Recent Labs     05/20/24  0810 09/25/23  0903 11/23/20  1016 11/16/20  1700   CHOL 140 196 140 175   LDLF  --  93 84 111*   LDLCALC 74  --   --   --    HDL 53.6 70.3 41.7 46.5   TRIG 64 162* 73 88     TOX:No results for input(s): \"AMPHETAMINE\", \"BENZO\", \"CANNABINOID\", \"COCAI\", \"FENTANYL\", \"OPIATE\", \"OXYCODONE\", \"PCP\" in the last 86682 hours.    No lab exists for component: \"BARBSCRUR\"  MICRO: No results for input(s): \"ESR\", \"CRP\", \"PROCAL\" in the last 75114 hours.  No results found for the last 90 days.      EKG:   Recent Labs     06/19/25  1024 05/13/25  1140 05/24/24  0800 04/26/24  0800 02/27/24  0820 10/27/23  0812   ATRRATE 65 250 241   < > 64 58   VENTRATE 65 91 97   < > 64 58   PRINT 314  --   --   --  170 186   QRSDUR 78 76 82   < > 82 88   QTCFRED 468 397 352   < > 457 490   QTCCALCB 474 425 381   < > 462 486    < > = values in this interval not displayed.     Encounter Date: 06/17/25   ECG 12 Lead   Result Value    Ventricular Rate 65    Atrial Rate 65    ID Interval 314    QRS Duration 78    QT Interval 456    QTC Calculation(Bazett) 474    P Axis 56    R Axis 1    T Axis -14    QRS Count 11    Q Onset 223    P Onset 66    P Offset 92    T Offset 451    QTC Fredericia 468    Narrative    Atrial flutter  Nonspecific ST and T wave abnormality  Prolonged QT  Abnormal ECG    Confirmed by Serge Gallagher ([id number]) on 6/19/2025 11:35:29 AM     Echocardiogram:   Recent Labs     05/13/25  1055 02/27/24  0943   EF 51 46   LVIDD 4.30 4.90   RVFRWALLPKSP  --  9.73   TAPSE  --  2.1   Transthoracic Echo (TTE) Complete 05/13/2025    [facility], [address] [place] [address]  Tel [phone] and Fax [phone]    TRANSTHORACIC ECHOCARDIOGRAM REPORT      Patient Name:       OSCAR ANSARI       Reading Physician:    [id number] Live Madrigal MD  Study Date:         5/13/2025           Ordering " Provider:    [id number] ASHLEE DUGGAN  MRN/PID:            [id number]            Fellow:  Accession#:         [id number]        Nurse:  Date of Birth/Age:  1960 / 64 years Sonographer:          GENNARO Garcia RDCS  Gender assigned at  M                   Additional Staff:  Birth:  Height:             172.72 cm           Admit Date:  Weight:             79.38 kg            Admission Status:     Outpatient  BSA / BMI:          1.93 m2 / 26.61     Encounter#:           [id number]  kg/m2  Blood Pressure:     110/70 mmHg         Department Location:  [facility]  Non Invasive    Study Type:    TRANSTHORACIC ECHO (TTE) COMPLETE  Diagnosis/ICD: Paroxysmal atrial fibrillation-I48.0  Indication:    Chronic persistan A-fib  CPT Code:      Echo Complete w Full Doppler-71684  Study Detail: The following Echo studies were performed: 2D, M-Mode, Doppler and  color flow. Patient's heart rhythm is A-fib RVR.      PHYSICIAN INTERPRETATION:  Left Ventricle: Left ventricular ejection fraction is low normal, calculated by Pederson's biplane at 51%. The patient is in atrial fibrillation which may influence the estimate of left ventricular function and transvalvular flows. There are no regional left ventricular wall motion abnormalities. The left ventricular cavity size is normal. There is mildly increased septal and normal posterior left ventricular wall thickness. Left ventricular diastolic filling is indeterminate due to atrial fibrillation/flutter.  Left Atrium: The left atrium is mildly dilated.  Right Ventricle: The right ventricle is normal in size. There is reduced right ventricular systolic function.  Right Atrium: The right atrium is moderately dilated.  Aortic Valve: The aortic valve is structurally normal. There is no evidence of aortic valve regurgitation. The peak instantaneous gradient of the aortic valve is 3 mmHg.  Mitral Valve: The mitral valve is normal in structure. There is trace mitral valve  regurgitation.  Tricuspid Valve: The tricuspid valve is structurally normal. There is trace tricuspid regurgitation. The right ventricular systolic pressure is unable to be estimated.  Pulmonic Valve: The pulmonic valve is not well visualized. The pulmonic valve regurgitation was not well visualized.  Pericardium: There is no pericardial effusion noted.  Aorta: The aortic root is normal. There is upper limits of normal dilatation of the ascending aorta.  Systemic Veins: The inferior vena cava appears small in size.  In comparison to the previous echocardiogram(s): Compared with study dated 2/27/2024, no significant change.      CONCLUSIONS:  1. Left ventricular ejection fraction is low normal, calculated by Pederson's biplane at 51%.  2. Left ventricular diastolic filling is indeterminate due to atrial fibrillation/flutter.  3. There is reduced right ventricular systolic function.  4. The left atrium is mildly dilated.  5. The right atrium is moderately dilated.  6. The patient is in atrial fibrillation which may influence the estimate of left ventricular function and transvalvular flows.    QUANTITATIVE DATA SUMMARY:    2D MEASUREMENTS:          Normal Ranges:  Ao Root d:       3.80 cm  (2.0-3.7cm)  LAs:             4.80 cm  (2.7-4.0cm)  IVSd:            1.30 cm  (0.6-1.1cm)  LVPWd:           0.90 cm  (0.6-1.1cm)  LVIDd:           4.30 cm  (3.9-5.9cm)  LVIDs:           3.30 cm  LV Mass Index:   84 g/m2  LVEDV Index:     43 ml/m2  LV % FS          23.3 %      LEFT ATRIUM:                  Normal Ranges:  LA Vol A4C:        64.6 ml    (22+/-6mL/m2)  LA Vol A2C:        60.5 ml  LA Vol BP:         63.1 ml  LA Vol Index A4C:  33.5ml/m2  LA Vol Index A2C:  31.4 ml/m2  LA Vol Index BP:   32.7 ml/m2  LA Area A4C:       21.0 cm2  LA Area A2C:       20.5 cm2  LA Major Axis A4C: 5.8 cm  LA Major Axis A2C: 5.9 cm      RIGHT ATRIUM:                 Normal Ranges:  RA Vol A4C:        85.8 ml    (8.3-19.5ml)  RA Vol Index A4C:   44.4 ml/m2  RA Area A4C:       24.2 cm2  RA Major Axis A4C: 5.8 cm      AORTA MEASUREMENTS:         Normal Ranges:  Ao Sinus, d:        3.80 cm (2.1-3.5cm)  Asc Ao, d:          3.80 cm (2.1-3.4cm)      LV SYSTOLIC FUNCTION:  Normal Ranges:  EF-A4C View:    57 % (>=55%)  EF-A2C View:    45 %  EF-Biplane:     51 %  EF-3DQ:         56 %  LV EF Reported: 51 %      AORTIC VALVE:           Normal Ranges:  AoV Vmax:      0.88 m/s (<=1.7m/s)  AoV Peak PG:   3.1 mmHg (<20mmHg)  LVOT Max Dylan:  0.65 m/s (<=1.1m/s)  LVOT VTI:      12.00 cm  LVOT Diameter: 2.20 cm  (1.8-2.4cm)  AoV Area,Vmax: 2.82 cm2 (2.5-4.5cm2)      RIGHT VENTRICLE:  RV Basal 4.00 cm  RV Mid   2.10 cm  RV Major 6.5 cm      PULMONIC VALVE:          Normal Ranges:  PV Max Dylan:     0.6 m/s  (0.6-0.9m/s)  PV Max P.3 mmHg      [id number] Live Madrigal MD  Electronically signed on 2025 at 12:29:20 PM        ** Final **    Coronary Angiography:   Adult Cath     Narrative  [facility], Cath Lab, [address] [place] [address]    Cardiovascular Catheterization Report    Patient Name:     OSCAR Schwarz           [id number] Aly Paris MD  PERIC       Physician:  Study Date:       2020  Verifying Physician: [id number] Aly Paris MD  MRN/PID:          [id number]    Cardiologist:  Accession/Order#: [id number]   Fellow:              [id number] Mamta Quiroz MD  YOB: 1960    Fellow:  Gender:           M           Referring Physician:  Admit Date:                   Referring Physician: [id number] Mirella Mathews MD  Surgeon:                      Referring Physician: [id number] Rolo Bright MD      Study: Left and Right Heart Catheterization      Indications:  OSCAR ANSARI is a 60 year old male who presents with atrial fibrillation. Cardiomyopathy and left ventricular dysfunction, with an asymptomatic chest pain assessment. Study performed as an elective cath procedure.    Appropriate Use Criteria:  Known or suspected  cardiomyopathy with or without heart failure; AUC score = 7.    Medical History:  Stress test performed: No. CTA performed: No. Agatston accessed: Yes. Agatston Score: 144. LVEF Assessed: Yes. LVEF = 35%.    Procedure Description:  After infiltration with 2% Lidocaine, the right radial artery was cannulated with a modified Seldinger technique. Subsequently a 6 Kyrgyz sheath was placed retrograde in the right radial artery. After infiltration of local anesthetic, the right internal jugular vein was cannulated with a micropuncture technique. A 9 sheath was placed in the vein. A balloon tipped catheter was advanced through the right heart to record pressures and balloon tipped catheter was positioned in the right heart system to record pressures and remained in the patient when transferred from the lab. Cardiac output was calculated via the Carloz method. After completion of the procedure, the arterial sheath was pulled and a TR Band Radial Compression Device was utilized to obtain patent hemostasis.    Coronary Angiography:  The coronary circulation is co-dominant.    Left Main Coronary Artery:  The left main coronary artery is a normal and a short caliber vessel. The left main arises normally from the left coronary sinus of Valsalva and bifurcates into the LAD and circumflex coronary arteries. The left main coronary artery showed a normal vessel.    Left Anterior Descending Coronary Artery Distribution:  The left anterior descending coronary artery is a normal caliber vessel. The LAD arises normally from the left main coronary artery. The LAD demonstrated mild irregularities and no significant disease or stenosis greater than 30%. The 1st diagonal branch is a normal caliber vessel. The 1st diagonal branch showed a normal vessel. The 2nd diagonal branch is a normal caliber vessel. The 2nd diagonal branch demonstrated a normal vessel.    Circumflex Coronary Artery Distribution:  The circumflex coronary artery is a normal  caliber vessel. The circumflex arises normally from the left main coronary artery and terminates in the AV groove. The circumflex revealed a normal vessel. The 1st obtuse marginal branch is a normal caliber vessel. The 1st obtuse marginal branch showed a normal vessel. The 2nd obtuse marginal branch is a medium-sized caliber vessel. The 2nd obtuse marginal branch demonstrated a normal vessel. The 3rd obtuse marginal branch is a medium-sized caliber vessel. The 3rd obtuse marginal branch revealed normal.    Ramus Intermedius:  The ramus intermedius is a medium-sized caliber vessel. The ramus intermedius showed a normal vessel.    Right Heart Catheterization:  A balloon tipped catheter was advanced through the right heart to record pressures and balloon tipped catheter was positioned in the right heart system to record pressures and remained in the patient when transferred from the lab. Cardiac output was calculated via the Carloz method. # Normal Cardiac output and index per Carloz method calculation  # Elevated left sided filling pressures with mean PCWP of 22 mmHG.  # Elevated right sided filling pressures with mean RA og 15 mmHg.  # Normal pulmonary artery pressure.    Right Coronary Artery Distribution:    The right coronary artery is a normal caliber vessel. The RCA arises normally from the right sinus of Valsalva. The RCA showed no significant disease or stenosis greater than 30% and mild irregularities. The mid right coronary artery showed 10 to 30% stenosis.    Coronary Interventions:    Coronary Lesion Summary:  Vessel      Stenosis      Vessel Segment  RCA    10 to 30% stenosis      mid      Hemo Personnel:  +----------------+------------+  Name            Duty          +----------------+------------+  Aly Paris MD, MD 1  +----------------+------------+  Nilam Yoder RNPDIANA NURSE 1  +----------------+------------+  Vijaya Echeverria RNPDIANA NURSE 2  +----------------+------------+    Follow-up  with [facility] team.    ____________________________________________________________________________________  CONCLUSIONS:  1. Mild non-obstructive coronary artery disease in a co-dominant system.  2. Normal Cardiac ouput and index.  3. Elevated filling pressures with mean PCWP of 22 mmHG and mean RA of 15 mmHg.    ____________________________________________________________________________________  CPT Codes:  Moderate Sedation Services initial 15 minutes patient >5 years-66478; Moderate Sedation Services 1st additional 15 minutes patient >5 years-56545; Moderate Sedation Services 2nd additional 15 minutes patient >5 years-12536; Coronary Angiography S&I only (RHC)([facility])-20537; Right Heart Cath O2/Cardiac output without biopsy ([facility])-48734    ICD 10 Codes:  I50.20-Unspecified systolic (congestive) heart failure    [id number] Aly Paris MD  Performing Physician  Electronically signed by [id number] Aly Paris MD on 11/27/2020 at 10:35:36 AM      cc Report to: x    cc Report to: [id number] Mirella Mathews MD    cc Report to: [id number] Rolo Bright MD      Right Heart Cath: No results found for this or any previous visit from the past 1800 days.    Cardiac Scoring:   CT HEART CALCIUM SCORING WO IV CONTRAST 11/19/2020    Narrative  MRN: [id number]  Patient Name: OSCAR ANSARI    STUDY:  CT CARDIAC SCORING;  11/19/2020 10:18 am    INDICATION:  CP, MCNAMARA.    COMPARISON:  None.    ACCESSION NUMBER(S):  [id number]    ORDERING CLINICIAN:  ROLO BRIGHT    TECHNIQUE:  Using prospective ECG gating, CT scan of the coronary arteries was  performed without intravenous contrast. Coronary calcium scoring  was  performed according to the method of Agatston.    FINDINGS:  The score and distribution of calcium in the coronary arteries is as  follows:    ,  LAD 0,  LCx 0,  RCA 0,    Total   144    The visualized mid/lower ascending thoracic aorta measures 4.4 cm in  diameter in the axial images at the level of the main pulmonary  artery. The heart  is normal in size. No pericardial effusion is  present. Main pulmonary is aneurysmal measuring 3.2 cm in diameter.    No gross evidence of mediastinal or hilar lymphadenopathy or masses  is identified. The visualized esophagus is unremarkable. A 8.7 x 4.2  oblong noncalcified nodule is noted in the right middle lobe (image  29 of 68).. No prior images are available for comparison. See  Fleischner criteria below for follow-up    The visualized segments of the lungs are normally expanded.    The visualized subdiaphragmatic structures appear intact. Small  hiatal hernia is noted.    Impression  1. Coronary artery calcium score of 144.  2. The mid ascending thoracic aorta is aneurysmal measuring 4.4 cm in  axial images at the level the main pulmonary artery. Would recommend  dedicated cardiac MRI to assess aortic valve and thoracic MRA in  12-18 months.  3. Main pulmonary artery is aneurysmal measuring 3.2 cm in diameter.  Correlate clinically for pulmonary hypertension.  4. Small hiatal hernia.  5. An 8.7 x 4.2 mm oblong noncalcified nodule is noted in the right  middle lobe. Please see Fleischner criteria below for follow-up    *Coronary Artery Calcium Gated and Nongated Agatston score  Score                                      Risk  0                                       Very low  1-99                                  Mildly increased  100-299                             Moderately increased  >300                                Moderate to severely increased    Yves et al. JCCT 2016 (http://dx.doi.org/10.1016/j.jcct.2016.11.003)    DUVAL 10-Year CHD Risk with Coronary Artery Calcification can be  calcuated using link below  [email or web address]://[email or web address]-[email or web address]  Clarence et al. JACC 2015 (http://dx.doi.org/10.1016/j.j  acc.2015.08.035)    *Follow-up recommendations according to the Fleischner Society  Criteria (Mike Salvador et al., Guidelines for management of  incidental pulmonary  nodules detected on CT images: From the  Fleischner Society 2017, DOI:  http://dx.doi.org/10.1148/radiol.2586906434.)  Dimensions are average of long and short axis, rounded to the nearest  millimeter. Consider all relevant risk factors.  SOLID NODULES:  SINGLE NODULE:  Low Risk:  < 6 mm No routine follow-up  6-8 mm CT at 6-12 months, then consider CT at 18-24 months  > 8 mm Consider CT at 3 months, PET/CT, or tissue sampling  Nodules <6 mm do not require routine follow-up, but certain patients  at high risk with suspicious nodule morphology, upper lobe location,  or both may warrant 12-month follow-up (recommendation 1A).  High risk:  < 6 mm Optional CT at 12 months  6-8 mm CT at 6-12 months, then CT at 18-24 months  > 8 mm Consider CT at 3 months, PET/CT, or tissue sampling  Nodules < 6 mm do not require routine follow-up, but certain patients  at high risk with suspicious nodule morphology, upper lobe location,  or both may warrant 12-month follow-up (recommendation 1A).    MULTIPLE NODULES:  Low risk:  < 6 mm No routine follow-up  6-8 mm CT at 3-6 months, then consider CT at 18-24 months  > 8 mm CT at 3-6 months, then consider CT at 18-24 months  Use most suspicious nodule as guide to management. Follow-up  intervals may vary according to size and risk (recommendation 2A).  High risk:  < 6 mm Optional CT at 12 months  6-8 mm CT at 3-6 months, then at 18-24 months  > 8 mm CT at 3-6 months, then at 18-24 months    Use most suspicious nodule as guide to management. Follow-up  intervals may vary according to size and risk (recommendation 2A).    Cardiac MRI: No results found for this or any previous visit from the past 1800 days.    Nuclear:No results found for this or any previous visit from the past 1800 days.    Metabolic Stress: No results found for this or any previous visit from the past 1800 days.      Imaging  No results found.    Cardiology, Vascular, and Other Imaging  No other imaging results found for  the past 7 days       LDA:   NUTRITION: Adult diet Regular  EMERGENCY CONTACT: Extended Emergency Contact Information  Primary Emergency Contact: Ananya Ramirez  Address: [address] [place] [address] [facility]  Home Phone: [phone]  Mobile Phone: [phone]  Relation: Spouse  CODE STATUS: Full Code  DISPO: Discharge Planning  Living Arrangements: Spouse/significant other  Support Systems: Spouse/significant other  Assistance Needed: None  Type of Residence: Private residence  Number of Stairs to Enter Residence: 2  Number of Stairs Within Residence: 15  Do you have animals or pets at home?: No  Who is requesting discharge planning?: Provider  Home or Post Acute Services: None  Expected Discharge Disposition: Home  Does the patient need discharge transport arranged?: No  AMPAC: Daily Activity - Total Score: 24    FOLLOWUP: No future appointments.                 [1]   Medications Prior to Admission   Medication Sig Dispense Refill Last Dose/Taking    apixaban (Eliquis) 5 mg tablet Take 1 tablet (5 mg) by mouth 2 times a day. 180 tablet 3     atorvastatin (Lipitor) 10 mg tablet Take 1 tablet (10 mg) by mouth once daily. as directed 90 tablet 3     brimonidine (AlphaGAN) 0.2 % ophthalmic solution Administer 1 drop into the right eye 2 times a day. 15 mL 3     dapagliflozin propanediol (Farxiga) 10 mg tablet Take 1 tablet (10 mg) by mouth once daily. 30 tablet 11     digoxin (Lanoxin) 250 MCG tab;et Take 1 tablet (250 mcg) by mouth once daily. 90 tablet 3     Entresto  mg tablet Take 1 tablet by mouth 2 times a day. 60 tablet 11     furosemide (Lasix) 20 mg tablet TAKE 1 TABLET BY MOUTH ONCE DAILY 270 tablet 0     methIMAzole (Tapazole) 5 mg tablet Take 1 tablet (5 mg) by mouth once daily. 30 tablet 11     metoprolol succinate XL (Toprol-XL) 100 mg 24 hr tablet Take 1 tablet (100 mg) by mouth 2 times a day. Do not crush or chew. 180 tablet 0     omeprazole (PriLOSEC) 40 mg  DR capsule Take 1 capsule (40 mg) by mouth 2 times a day before meals for 14 days. Do not crush or chew. (Patient not taking: Reported on 5/13/2025) 28 capsule 0     omeprazole (PriLOSEC) 40 mg DR capsule Take 1 capsule (40 mg) by mouth once daily. Do not crush or chew. (Patient not taking: Reported on 6/27/2025) 90 capsule 0 Not Taking    timolol (Timoptic) 0.5 % ophthalmic solution USE 1 DROP IN THE RIGHT EYE EVERY MORNING. 55 mL 0     timolol (Timoptic) 0.5 % ophthalmic solution Administer 1 drop into the right eye once daily in the morning. 15 mL 3

## 2025-06-29 NOTE — DISCHARGE SUMMARY
Discharge Diagnosis  Heart failure    Issues Requiring Follow-Up  ? Restart beta blocker    Test Results Pending At Discharge  Pending Labs       No current pending labs.            Hospital Course  Endy Nuñez is a 64 y.o. male with a PMHx of tachymediated caridomyopathy with improved EF, AF/AFL s/p PVI and RFA x2 (2020, 2022), untreated TENISHA, mild non obstructive CAD,  & amiodarone induced hyperthyroidism. Admitted for tikosyn loading given persistent symptomatic atypical atrial flutter. .     Electrophysiologist: Yasmani Francisco MD  Primary Cardiologist: Eliana Be MD     Arrhythmia History in brief  - s/p HASEEB w/DCCV 2020  with quick reversion to AFL. Dofetilide initiated. Dose limited to 125mcg due to prolong QT.  - Continued to have break through AF/AFL  - s/p  PVI and RFA of AFL 12/2020. EF subsequent improved to 45-50%.  - Recurrent AF 11/2022. Flecainide initiated. S/p successful DCCV 12/2022.  Flecainide switched to (?at some point) amiodarone.  - s/p PVI posterior and anterior wall scar modification RFA 3/2023. No recurrence on heart monitor. Amiodarone discontinued 10/2023.   - Recurrent symptomatic  AFL 4/2024 and amiodarone resumed. Later discontinued by endocrinology due to new hyperthyroidism. Started on methimazole.  - Rate controlled with digoxin 5/2025.     6/17 Dr. Be visit: AFL rates better controlled with addition of digoxin. No SOB. Had indiscretion with dietary and diuretic with travel resulting in edema. Reported decreased energy and exertional intolerance. EKG AFL 65bpm.      6/17 Dr. Yasmani Francisco visit. Pt will atypical flutter. Rate controlled on digoxin. Having signs of CHF. Discussed options of either observation vs restarting dofetilide with subsequent DCCV.    Floor Course  EP consulted, Tikosyn loaded at 250mcg q12hrs. Digoxin stopped. QTc remained WNL. Converted to SR 6/27 AM. Metoprolol held for asymptomatic bradycardia, HR 50s at rest, 60s on exertion. Plan to assess  restarting as an outpatient.     90 day Tikosyn supply delivered via Med[facility] to [facility].     Discharge wt 80.8kg    After all labs and VS were reviewed the decision was made that the patient was medically stable for discharge.  The patient was discharged in satisfactory condition.    More than 30 minutes were spent in coordinating patient discharge./    Visit Vitals  /64 (BP Location: Left arm, Patient Position: Sitting)   Pulse 61   Temp 35.9 °C (96.6 °F) (Temporal)   Resp 18     Vitals:    06/29/25 0347   Weight: 80.8 kg (178 lb 2.1 oz)       Immunization History   Administered Date(s) Administered    Moderna SARS-CoV-2 Vaccination 03/27/2021, 04/21/2021, 12/24/2021    Tdap vaccine, age 7 year and older (BOOSTRIX, ADACEL) 07/09/2013, 09/22/2023       Results        Pertinent Physical Exam At Time of Discharge  Physical Exam    Home Medications     Medication List      START taking these medications     dofetilide 250 mcg capsule; Commonly known as: Tikosyn; Take 1 capsule   (250 mcg) by mouth every 12 hours.     CONTINUE taking these medications     apixaban 5 mg tablet; Commonly known as: Eliquis; Take 1 tablet (5 mg)   by mouth 2 times a day.   atorvastatin 10 mg tablet; Commonly known as: Lipitor; Take 1 tablet (10   mg) by mouth once daily. as directed   brimonidine 0.2 % ophthalmic solution; Commonly known as: AlphaGAN;   Administer 1 drop into the right eye 2 times a day.   Entresto  mg tablet; Generic drug: sacubitriL-valsartan; Take 1   tablet by mouth 2 times a day.   Farxiga 10 mg tablet; Generic drug: dapagliflozin propanediol; Take 1   tablet (10 mg) by mouth once daily.   furosemide 20 mg tablet; Commonly known as: Lasix; TAKE 1 TABLET BY   MOUTH ONCE DAILY   methIMAzole 5 mg tablet; Commonly known as: Tapazole; Take 1 tablet (5   mg) by mouth once daily.   timolol 0.5 % ophthalmic solution; Commonly known as: Timoptic; USE 1   DROP IN THE RIGHT EYE EVERY MORNING.     STOP taking these  medications     digoxin 250 MCG tab;et; Commonly known as: Lanoxin   metoprolol succinate  mg 24 hr tablet; Commonly known as:   Toprol-XL       Outpatient Follow-Up  No future appointments.    ORLIN Steele-CNP

## 2025-06-29 NOTE — CARE PLAN
Problem: Chronic Conditions and Co-morbidities  Goal: Patient's chronic conditions and co-morbidity symptoms are monitored and maintained or improved  Outcome: Progressing     Problem: Heart Failure  Goal: Improved urinary output this shift  Outcome: Progressing  Goal: Report improvement of dyspnea/breathlessness this shift  Outcome: Progressing  Goal: Weight from fluid excess reduced over 2-3 days, then stabilize  Outcome: Progressing  Goal: Increase self care and/or family involvement in 24 hours  Outcome: Progressing     Problem: Pain - Adult  Goal: Verbalizes/displays adequate comfort level or baseline comfort level  Outcome: Met     Problem: Safety - Adult  Goal: Free from fall injury  Outcome: Met     Problem: Discharge Planning  Goal: Discharge to home or other facility with appropriate resources  Outcome: Met     Problem: Nutrition  Goal: Nutrient intake appropriate for maintaining nutritional needs  Outcome: Met     Problem: Heart Failure  Goal: Improved gas exchange this shift  Outcome: Met  Goal: Reduction in peripheral edema within 24 hours  Outcome: Met   The patient's goals for the shift include go home today    The clinical goals for the shift include remain in SR    Over the shift, the patient remained hds; pt remains in SR and is being discharged to home with 3 month supply of tikosyn; pt receiveh his med from [facility] prior to leaving today; piv removed with tip intact and 2x2 applied; discharge instructions reviewed with pt including when his next dose of medications are due; pt left the floor via wheel chair with all his belongings.

## 2025-06-29 NOTE — PROGRESS NOTES
Subjective Data:  Qtc remains stable, no complaints from patient  Some bradycardia overnight to 40s with some PACs       Objective Data:  Last Recorded Vitals:  Vitals:    06/28/25 2114 06/28/25 2322 06/29/25 0347 06/29/25 0747   BP:  97/62 103/63 100/64   BP Location:    Left arm   Patient Position:  Lying Sitting Sitting   Pulse: 66 53 56 61   Resp:  18 16 18   Temp:  36.4 °C (97.5 °F) 36 °C (96.8 °F) 35.9 °C (96.6 °F)   TempSrc:    Temporal   SpO2:  97% 99% 98%   Weight:   80.8 kg (178 lb 2.1 oz)    Height:           Last Labs:  CBC - 6/28/2025:  5:43 PM  6.3 13.9 175    44.0      CMP - 6/29/2025:  4:32 AM  9.0 5.9 14 --- 0.4   4.5 3.9 10 48      PTT - No results in last year.  _   _ _     BNP   Date/Time Value Ref Range Status   06/26/2025 07:02  0 - 99 pg/mL Final   05/12/2025 07:59  <100 pg/mL Final     Comment:        BNP levels increase with age in the general  population with the highest values seen in  individuals greater than 75 years of age.  Reference: J. Am. Guillermina. Cardiol. 2002; 40:976-982.        02/27/2024 10:38  0 - 99 pg/mL Final     LDLCALC   Date/Time Value Ref Range Status   05/20/2024 08:10 AM 74 <=99 mg/dL Final     Comment:                                 Near   Borderline      AGE      Desirable  Optimal    High     High     Very High     0-19 Y     0 - 109     ---    110-129   >/= 130     ----    20-24 Y     0 - 119     ---    120-159   >/= 160     ----      >24 Y     0 -  99   100-129  130-159   160-189     >/=190       VLDL   Date/Time Value Ref Range Status   05/20/2024 08:10 AM 13 0 - 40 mg/dL Final   09/25/2023 09:03 AM 32 0 - 40 mg/dL Final   11/23/2020 10:16 AM 15 0 - 40 mg/dL Final   11/16/2020 05:00 PM 18 0 - 40 mg/dL Final      Last I/O:  I/O last 3 completed shifts:  In: 900 (11.1 mL/kg) [P.O.:900]  Out: 3950 (48.9 mL/kg) [Urine:3950 (1.4 mL/kg/hr)]  Weight: 80.8 kg     Past Cardiology Tests (Last 3 Years):  EKG:  ECG 12 Lead 06/19/2025      ECG 12 lead (Clinic  Performed) 05/13/2025      ECG 12 lead (Clinic Performed) 05/24/2024      ECG 12 Lead 04/26/2024      ECG 12 lead (Clinic Performed) 02/27/2024      ECG 12 lead (Clinic Performed) 10/27/2023    Echo:  Transthoracic echo (TTE) complete 05/13/2025      Transthoracic echo (TTE) complete 02/27/2024    Ejection Fractions:  EF   Date/Time Value Ref Range Status   05/13/2025 10:55 AM 51 %    02/27/2024 09:43 AM 46 %      Cath:  No results found for this or any previous visit from the past 1095 days.    Stress Test:  No results found for this or any previous visit from the past 1095 days.    Cardiac Imaging:  No results found for this or any previous visit from the past 1095 days.      Inpatient Medications:  Scheduled Medications[1]  PRN Medications[2]  Continuous Medications[3]    Physical Exam:  GEN: NAD  HEENT: ATNC  CV: RRR  Pulm: no increased WOB  Abdomen: NTND  Ext: warm  Neuro: A+Ox3  Psych: appropriate       Assessment/Plan     Endy Nuñez is a 64 y.o. male with a PMHx of tachymediated caridomyopathy with improved EF, AF/AFL s/p PVI and RFA x2 (2020, 2022), untreated TENISHA, mild non obstructive CAD,  & amiodarone induced hyperthyroidism. Admitted for tikosyn loading.     6/26: first dose during evening with conversion to NSR  6/27: qt wnls 426  6/28: remains in nsr with Qtc 441; brief run of flutter on telemetry; evening Qtc 460  6/29: AM dose     #AF/AFL s/p PVI and RFA x2 (2020, 2022)  #Atypical flutter  -okay to dc patient with Tikosyn 250 mcg BID  -follow up Dr Francisco within 4 weeks   -okay to hold BB given bradycardia  -Instructed patient on tikosyn use. See DC instructions below.         ---------------------------------------------------------------------------------------------  IF YOU MISS A DOSE OF TIKOSYN (dofetilide), DO NOT TRY TO MAKE UP THE DOSE.  NEVER TAKE 2 DOSES AT THE SAME TIME.     *You should have bloodwork to check kidney function and electrolytes every 3-6 months with your primary  provider or primary cardiologist while on Tikosyn. You will also need an EKG at least every 6 months while on Tikosyn.     *DO NOT take any herbs or supplements without first discussing with your healthcare provider or pharmacist. DO NOT eat grapefruit or drink grapefruit juice with Tikosyn (other citrus fruit is okay).  DO NOT break, chew, or open the capsules; they must be swallowed whole.     *You must tell any provider that treats you that you are on Tikosyn (due to potential for drug-drug interactions).  Keep an up-to-date medication list in your wallet (in addition to any list you keep in your phone).     * A website for lists of medications that can prolong the QT interval (what we measure on EKG during Tikosyn admission) is: trueAnthem.Dinglepharb     * If you feel as though you are out of rhythm again, please call Dr. Yasmani Francisco's office at  or, if it's an emergency, please call 911 or have someone drive you to the nearest emergency room.     * Be careful not to run out of Tikosyn; get it refilled a week before you will run out. If you need more refills, please call Dr Yasmani Francisco's office or send them a message via [employer].     * Continue to follow up with your primary cardiologist, primary care physician, and any other specialists you normally see.     Thank you for this consult. Recommendations are preliminary until note is co-signed by an attending.     Peripheral IV 06/26/25 20 G Left;Posterior Forearm (Active)   Site Assessment Clean;Dry;Intact 06/28/25 0800   Dressing Type Tape;Transparent 06/28/25 0800   Line Status Capped;Flushed;Saline locked 06/28/25 0800   Dressing Status Clean;Dry;Occlusive 06/28/25 0800   Number of days: 2       Code Status:  Full Code    I spent 25 minutes in the professional and overall care of this patient.        Perry Johnston MD         [1]   Scheduled medications   Medication Dose Route Frequency    apixaban  5 mg oral BID    atorvastatin  10 mg oral Daily    brimonidine  1  drop Right Eye BID    dapagliflozin propanediol  10 mg oral Daily    dofetilide  250 mcg oral q12h DIANDRA    furosemide  20 mg oral Daily    methIMAzole  5 mg oral Daily    [Held by provider] metoprolol succinate XL  75 mg oral BID    sacubitriL-valsartan  1 tablet oral BID    timolol  1 drop Right Eye Daily   [2]   PRN medications   Medication    acetaminophen    Or    acetaminophen    Or    acetaminophen    melatonin    polyethylene glycol   [3]   Continuous Medications   Medication Dose Last Rate

## 2025-06-30 ENCOUNTER — PATIENT OUTREACH (OUTPATIENT)
Dept: CARE COORDINATION | Facility: CLINIC | Age: 65
End: 2025-06-30
Payer: COMMERCIAL

## 2025-06-30 LAB
ATRIAL RATE: 52 BPM
ATRIAL RATE: 62 BPM
P AXIS: 24 DEGREES
P AXIS: 66 DEGREES
P OFFSET: 183 MS
P OFFSET: 195 MS
P ONSET: 138 MS
P ONSET: 139 MS
PR INTERVAL: 142 MS
PR INTERVAL: 168 MS
Q ONSET: 210 MS
Q ONSET: 222 MS
QRS COUNT: 10 BEATS
QRS COUNT: 8 BEATS
QRS DURATION: 100 MS
QRS DURATION: 82 MS
QT INTERVAL: 462 MS
QT INTERVAL: 498 MS
QTC CALCULATION(BAZETT): 463 MS
QTC CALCULATION(BAZETT): 468 MS
QTC FREDERICIA: 467 MS
QTC FREDERICIA: 474 MS
R AXIS: -11 DEGREES
R AXIS: -5 DEGREES
T AXIS: 1 DEGREES
T AXIS: 2 DEGREES
T OFFSET: 453 MS
T OFFSET: 459 MS
VENTRICULAR RATE: 52 BPM
VENTRICULAR RATE: 62 BPM

## 2025-06-30 NOTE — PROGRESS NOTES
Wrap Up  Wrap Up Additional Comments: Endy reports he is doing well sine the Tikosyn initiation.  Has all of his scripts filled, no side effects at this time.  He will be scheduling his cardiology follow up today (6/30/2025 11:19 AM)    Medications  Medications reviewed with patient/caregiver?: Yes (6/30/2025 11:19 AM)  Is the patient having any side effects they believe may be caused by any medication additions or changes?: No (6/30/2025 11:19 AM)  Does the patient have all medications ordered at discharge?: Yes (6/30/2025 11:19 AM)  Care Management Interventions: No intervention needed (6/30/2025 11:19 AM)  Is the patient taking all medications as directed (includes completed medication regime)?: Yes (6/30/2025 11:19 AM)  Care Management Interventions: Provided patient education (6/30/2025 11:19 AM)    Appointments  Does the patient have a primary care provider?: Yes (will be following up with cardiology) (6/30/2025 11:19 AM)  Care Management Interventions: Advised to schedule with specialist (6/30/2025 11:19 AM)    Patient Teaching  Does the patient have access to their discharge instructions?: Yes (6/30/2025 11:19 AM)  Care Management Interventions: Reviewed instructions with patient (6/30/2025 11:19 AM)  What is the patient's perception of their health status since discharge?: Returned to baseline/stable (6/30/2025 11:19 AM)  Is the patient/caregiver able to teach back the hierarchy of who to call/visit for symptoms/problems? PCP, Specialist, Home Health nurse, Urgent Care, ED, 911: Yes (6/30/2025 11:19 AM)      Paty Paz RN [facility]  [phone]

## 2025-07-01 LAB
ATRIAL RATE: 249 BPM
ATRIAL RATE: 256 BPM
ATRIAL RATE: 60 BPM
P AXIS: 67 DEGREES
P AXIS: 84 DEGREES
P AXIS: 86 DEGREES
P OFFSET: 181 MS
P OFFSET: 212 MS
P ONSET: 126 MS
P ONSET: 171 MS
PR INTERVAL: 184 MS
Q ONSET: 218 MS
Q ONSET: 220 MS
Q ONSET: 224 MS
QRS COUNT: 10 BEATS
QRS COUNT: 12 BEATS
QRS COUNT: 14 BEATS
QRS DURATION: 86 MS
QRS DURATION: 88 MS
QRS DURATION: 90 MS
QT INTERVAL: 368 MS
QT INTERVAL: 368 MS
QT INTERVAL: 450 MS
QTC CALCULATION(BAZETT): 408 MS
QTC CALCULATION(BAZETT): 442 MS
QTC CALCULATION(BAZETT): 450 MS
QTC FREDERICIA: 394 MS
QTC FREDERICIA: 416 MS
QTC FREDERICIA: 450 MS
R AXIS: -13 DEGREES
R AXIS: -14 DEGREES
R AXIS: -5 DEGREES
T AXIS: -41 DEGREES
T AXIS: -7 DEGREES
T AXIS: -7 DEGREES
T OFFSET: 404 MS
T OFFSET: 408 MS
T OFFSET: 443 MS
VENTRICULAR RATE: 60 BPM
VENTRICULAR RATE: 74 BPM
VENTRICULAR RATE: 87 BPM

## 2025-07-03 PROCEDURE — RXMED WILLOW AMBULATORY MEDICATION CHARGE

## 2025-07-06 DIAGNOSIS — I48.91 ATRIAL FIBRILLATION (MULTI): ICD-10-CM

## 2025-07-07 ENCOUNTER — PHARMACY VISIT (OUTPATIENT)
Dept: PHARMACY | Facility: CLINIC | Age: 65
End: 2025-07-07
Payer: COMMERCIAL

## 2025-07-08 LAB
ALBUMIN SERPL-MCNC: 4.3 G/DL (ref 3.6–5.1)
BUN SERPL-MCNC: 15 MG/DL (ref 7–25)
BUN/CREAT SERPL: ABNORMAL (CALC) (ref 6–22)
CALCIUM SERPL-MCNC: 8.9 MG/DL (ref 8.6–10.3)
CHLORIDE SERPL-SCNC: 104 MMOL/L (ref 98–110)
CO2 SERPL-SCNC: 22 MMOL/L (ref 20–32)
CREAT SERPL-MCNC: 1.15 MG/DL (ref 0.7–1.35)
EGFRCR SERPLBLD CKD-EPI 2021: 70 ML/MIN/1.73M2
GLUCOSE SERPL-MCNC: 118 MG/DL (ref 65–99)
MAGNESIUM SERPL-MCNC: 2.3 MG/DL (ref 1.5–2.5)
PHOSPHATE SERPL-MCNC: 3.3 MG/DL (ref 2.1–4.3)
POTASSIUM SERPL-SCNC: 4.2 MMOL/L (ref 3.5–5.3)
SODIUM SERPL-SCNC: 136 MMOL/L (ref 135–146)

## 2025-07-09 LAB
ATRIAL RATE: 249 BPM
ATRIAL RATE: 256 BPM
ATRIAL RATE: 52 BPM
ATRIAL RATE: 60 BPM
ATRIAL RATE: 62 BPM
P AXIS: 24 DEGREES
P AXIS: 66 DEGREES
P AXIS: 67 DEGREES
P AXIS: 84 DEGREES
P AXIS: 86 DEGREES
P OFFSET: 181 MS
P OFFSET: 183 MS
P OFFSET: 195 MS
P OFFSET: 212 MS
P ONSET: 126 MS
P ONSET: 138 MS
P ONSET: 139 MS
P ONSET: 171 MS
PR INTERVAL: 142 MS
PR INTERVAL: 168 MS
PR INTERVAL: 184 MS
Q ONSET: 210 MS
Q ONSET: 218 MS
Q ONSET: 220 MS
Q ONSET: 222 MS
Q ONSET: 224 MS
QRS COUNT: 10 BEATS
QRS COUNT: 10 BEATS
QRS COUNT: 12 BEATS
QRS COUNT: 14 BEATS
QRS COUNT: 8 BEATS
QRS DURATION: 100 MS
QRS DURATION: 82 MS
QRS DURATION: 86 MS
QRS DURATION: 88 MS
QRS DURATION: 90 MS
QT INTERVAL: 368 MS
QT INTERVAL: 368 MS
QT INTERVAL: 450 MS
QT INTERVAL: 462 MS
QT INTERVAL: 498 MS
QTC CALCULATION(BAZETT): 408 MS
QTC CALCULATION(BAZETT): 442 MS
QTC CALCULATION(BAZETT): 450 MS
QTC CALCULATION(BAZETT): 463 MS
QTC CALCULATION(BAZETT): 468 MS
QTC FREDERICIA: 394 MS
QTC FREDERICIA: 416 MS
QTC FREDERICIA: 450 MS
QTC FREDERICIA: 467 MS
QTC FREDERICIA: 474 MS
R AXIS: -11 DEGREES
R AXIS: -13 DEGREES
R AXIS: -14 DEGREES
R AXIS: -5 DEGREES
R AXIS: -5 DEGREES
T AXIS: -41 DEGREES
T AXIS: -7 DEGREES
T AXIS: -7 DEGREES
T AXIS: 1 DEGREES
T AXIS: 2 DEGREES
T OFFSET: 404 MS
T OFFSET: 408 MS
T OFFSET: 443 MS
T OFFSET: 453 MS
T OFFSET: 459 MS
VENTRICULAR RATE: 52 BPM
VENTRICULAR RATE: 60 BPM
VENTRICULAR RATE: 62 BPM
VENTRICULAR RATE: 74 BPM
VENTRICULAR RATE: 87 BPM

## 2025-07-11 NOTE — DOCUMENTATION CLARIFICATION NOTE
"    PATIENT:               OSCAR ANSARI  ACCT #:                  [id number]  MRN:                       [id number]  :                       1960  ADMIT DATE:       2025 6:15 PM  DISCH DATE:        2025 1:16 PM  RESPONDING PROVIDER #:        [id number]          PROVIDER RESPONSE TEXT:    Chronic Diastolic Congestive Heart Failure    CDI QUERY TEXT:    Clarification    Instruction:    Based on your assessment of the patient and the clinical information, please provide the requested documentation by clicking on the appropriate radio button and enter any additional information if prompted.    Question: Please further clarify the type and acuity of congestive heart failure    When answering this query, please exercise your independent professional judgment. The fact that a question is being asked, does not imply that any particular answer is desired or expected.    The patient's clinical indicators include:  Clinical Information: 64 y.o. male with A flutter admitted for tikosyn loading.    Clinical Indicators:   H&P - \"PMHx of tachymediated caridomyopathy with improved EF\", \"He has a past medical history of ... CHF (congestive heart failure)\"  \"Assessment/Plan :  Tachy-mediated cardiomyopathy 35%> 50%  TTE 25: EF 51%, in AF/AFL.\"     Cardiology - \"2025 TTE: EF 51%\", Assessment & Plan :  Heart failure\", \"Tachy-mediated cardiomyopathy, recovered EF: EF 51% on .\"    Treatment:  Cardiology consult on   digoxin (Lanoxin) tablet 250 mcg Daily  metoprolol succinate XL (Toprol-XL) 24 hr tablet 100 mg Daily  sacubitriL-valsartan (Entresto)  mg per tablet 1 tablet 2 times daily    Risk Factors: Age, Cardiomyopathy, HTN  Options provided:  -- Chronic Diastolic Congestive Heart Failure  -- Other - I will add my own diagnosis  -- Refer to Clinical Documentation Reviewer    Query created by: Lloyd Enamorado on 7/3/2025 10:25 AM      Electronically signed by:  EDITH BRASHER" NAS BOBBY 7/11/2025 10:18 AM

## 2025-07-18 ENCOUNTER — PATIENT OUTREACH (OUTPATIENT)
Dept: CARE COORDINATION | Facility: CLINIC | Age: 65
End: 2025-07-18
Payer: COMMERCIAL

## 2025-07-18 NOTE — PROGRESS NOTES
[facility] [place] [facility]  Call placed and VMM left    Paty Paz, RN [facility]  [phone]

## 2025-07-30 ENCOUNTER — PATIENT OUTREACH (OUTPATIENT)
Dept: CARE COORDINATION | Facility: CLINIC | Age: 65
End: 2025-07-30
Payer: COMMERCIAL

## 2025-07-30 PROCEDURE — RXMED WILLOW AMBULATORY MEDICATION CHARGE

## 2025-07-30 NOTE — PROGRESS NOTES
[facility] [place] [facility] follow up:  Endy reports he is doing very well, tolerating the Tikosyn and is having no difficulties obtaining his scripts.  Has cardiology appt scheduled 8/22.  Will close the [facility] program    Paty Paz RN [facility] [place]  [phone]

## 2025-07-31 ENCOUNTER — PHARMACY VISIT (OUTPATIENT)
Dept: PHARMACY | Facility: CLINIC | Age: 65
End: 2025-07-31
Payer: COMMERCIAL

## 2025-08-07 PROCEDURE — RXMED WILLOW AMBULATORY MEDICATION CHARGE

## 2025-08-11 ENCOUNTER — PHARMACY VISIT (OUTPATIENT)
Dept: PHARMACY | Facility: CLINIC | Age: 65
End: 2025-08-11
Payer: COMMERCIAL

## 2025-08-11 PROCEDURE — RXMED WILLOW AMBULATORY MEDICATION CHARGE

## 2025-08-19 ENCOUNTER — APPOINTMENT (OUTPATIENT)
Dept: CARDIOLOGY | Facility: HOSPITAL | Age: 65
End: 2025-08-19
Payer: COMMERCIAL

## 2025-08-22 ENCOUNTER — OFFICE VISIT (OUTPATIENT)
Dept: CARDIOLOGY | Facility: HOSPITAL | Age: 65
End: 2025-08-22
Payer: COMMERCIAL

## 2025-08-22 VITALS
BODY MASS INDEX: 26.22 KG/M2 | WEIGHT: 173 LBS | SYSTOLIC BLOOD PRESSURE: 109 MMHG | HEART RATE: 64 BPM | HEIGHT: 68 IN | DIASTOLIC BLOOD PRESSURE: 73 MMHG | OXYGEN SATURATION: 98 %

## 2025-08-22 DIAGNOSIS — I48.91 ATRIAL FIBRILLATION, UNSPECIFIED TYPE (MULTI): Primary | ICD-10-CM

## 2025-08-22 LAB
ATRIAL RATE: 64 BPM
P AXIS: 47 DEGREES
P OFFSET: 191 MS
P ONSET: 145 MS
PR INTERVAL: 148 MS
Q ONSET: 219 MS
QRS COUNT: 11 BEATS
QRS DURATION: 86 MS
QT INTERVAL: 444 MS
QTC CALCULATION(BAZETT): 458 MS
QTC FREDERICIA: 453 MS
R AXIS: 47 DEGREES
T AXIS: 11 DEGREES
T OFFSET: 441 MS
VENTRICULAR RATE: 64 BPM

## 2025-08-22 PROCEDURE — 3008F BODY MASS INDEX DOCD: CPT | Performed by: INTERNAL MEDICINE

## 2025-08-22 PROCEDURE — 99212 OFFICE O/P EST SF 10 MIN: CPT

## 2025-08-22 PROCEDURE — 93005 ELECTROCARDIOGRAM TRACING: CPT | Performed by: INTERNAL MEDICINE

## 2025-08-22 PROCEDURE — 1159F MED LIST DOCD IN RCRD: CPT | Performed by: INTERNAL MEDICINE

## 2025-08-22 PROCEDURE — 99214 OFFICE O/P EST MOD 30 MIN: CPT | Performed by: INTERNAL MEDICINE

## 2025-08-22 ASSESSMENT — ENCOUNTER SYMPTOMS
LOSS OF SENSATION IN FEET: 0
OCCASIONAL FEELINGS OF UNSTEADINESS: 0
DEPRESSION: 0

## 2025-08-22 ASSESSMENT — COLUMBIA-SUICIDE SEVERITY RATING SCALE - C-SSRS
6. HAVE YOU EVER DONE ANYTHING, STARTED TO DO ANYTHING, OR PREPARED TO DO ANYTHING TO END YOUR LIFE?: NO
2. HAVE YOU ACTUALLY HAD ANY THOUGHTS OF KILLING YOURSELF?: NO
1. IN THE PAST MONTH, HAVE YOU WISHED YOU WERE DEAD OR WISHED YOU COULD GO TO SLEEP AND NOT WAKE UP?: NO

## 2025-09-23 ENCOUNTER — APPOINTMENT (OUTPATIENT)
Dept: CARDIOLOGY | Facility: HOSPITAL | Age: 65
End: 2025-09-23
Payer: COMMERCIAL